# Patient Record
Sex: FEMALE | Race: WHITE | NOT HISPANIC OR LATINO | Employment: FULL TIME | URBAN - METROPOLITAN AREA
[De-identification: names, ages, dates, MRNs, and addresses within clinical notes are randomized per-mention and may not be internally consistent; named-entity substitution may affect disease eponyms.]

---

## 2017-08-04 ENCOUNTER — APPOINTMENT (OUTPATIENT)
Dept: LAB | Age: 57
End: 2017-08-04

## 2017-08-04 ENCOUNTER — TRANSCRIBE ORDERS (OUTPATIENT)
Dept: ADMINISTRATIVE | Age: 57
End: 2017-08-04

## 2017-08-04 DIAGNOSIS — Z00.8 HEALTH EXAMINATION IN POPULATION SURVEY: ICD-10-CM

## 2017-08-04 DIAGNOSIS — Z00.8 HEALTH EXAMINATION IN POPULATION SURVEY: Primary | ICD-10-CM

## 2017-08-04 LAB
CHOLEST SERPL-MCNC: 159 MG/DL (ref 50–200)
EST. AVERAGE GLUCOSE BLD GHB EST-MCNC: 117 MG/DL
HBA1C MFR BLD: 5.7 % (ref 4.2–6.3)
HDLC SERPL-MCNC: 56 MG/DL (ref 40–60)
LDLC SERPL CALC-MCNC: 80 MG/DL (ref 0–100)
TRIGL SERPL-MCNC: 113 MG/DL

## 2017-08-04 PROCEDURE — 83036 HEMOGLOBIN GLYCOSYLATED A1C: CPT

## 2017-08-04 PROCEDURE — 36415 COLL VENOUS BLD VENIPUNCTURE: CPT

## 2017-08-04 PROCEDURE — 80061 LIPID PANEL: CPT

## 2017-08-08 ENCOUNTER — TRANSCRIBE ORDERS (OUTPATIENT)
Dept: ADMINISTRATIVE | Facility: HOSPITAL | Age: 57
End: 2017-08-08

## 2017-08-08 DIAGNOSIS — Z12.31 VISIT FOR SCREENING MAMMOGRAM: Primary | ICD-10-CM

## 2017-08-08 DIAGNOSIS — Z12.31 ENCOUNTER FOR SCREENING MAMMOGRAM FOR MALIGNANT NEOPLASM OF BREAST: ICD-10-CM

## 2017-08-22 ENCOUNTER — ALLSCRIPTS OFFICE VISIT (OUTPATIENT)
Dept: OTHER | Facility: OTHER | Age: 57
End: 2017-08-22

## 2017-08-22 ENCOUNTER — LAB REQUISITION (OUTPATIENT)
Dept: LAB | Facility: HOSPITAL | Age: 57
End: 2017-08-22
Payer: COMMERCIAL

## 2017-08-22 DIAGNOSIS — Z01.419 ENCOUNTER FOR GYNECOLOGICAL EXAMINATION WITHOUT ABNORMAL FINDING: ICD-10-CM

## 2017-08-22 PROCEDURE — G0145 SCR C/V CYTO,THINLAYER,RESCR: HCPCS | Performed by: OBSTETRICS & GYNECOLOGY

## 2017-08-23 ENCOUNTER — HOSPITAL ENCOUNTER (OUTPATIENT)
Dept: RADIOLOGY | Age: 57
Discharge: HOME | End: 2017-08-23
Payer: COMMERCIAL

## 2017-08-23 DIAGNOSIS — Z12.31 ENCOUNTER FOR SCREENING MAMMOGRAM FOR MALIGNANT NEOPLASM OF BREAST: ICD-10-CM

## 2017-08-23 PROCEDURE — G0202 SCR MAMMO BI INCL CAD: HCPCS

## 2017-08-30 LAB
LAB AP GYN PRIMARY INTERPRETATION: NORMAL
Lab: NORMAL
PATH INTERP SPEC-IMP: NORMAL

## 2017-12-07 ENCOUNTER — APPOINTMENT (OUTPATIENT)
Dept: LAB | Facility: HOSPITAL | Age: 57
End: 2017-12-07
Attending: OBSTETRICS & GYNECOLOGY
Payer: COMMERCIAL

## 2017-12-07 ENCOUNTER — GENERIC CONVERSION - ENCOUNTER (OUTPATIENT)
Dept: OTHER | Facility: OTHER | Age: 57
End: 2017-12-07

## 2017-12-07 ENCOUNTER — TRANSCRIBE ORDERS (OUTPATIENT)
Dept: LAB | Facility: HOSPITAL | Age: 57
End: 2017-12-07

## 2017-12-07 DIAGNOSIS — N92.1 METRORRHAGIA: Primary | ICD-10-CM

## 2017-12-07 DIAGNOSIS — N92.1 EXCESSIVE AND FREQUENT MENSTRUATION WITH IRREGULAR CYCLE: ICD-10-CM

## 2017-12-07 LAB — FSH SERPL-ACNC: 23.5 MIU/ML

## 2017-12-07 PROCEDURE — 83001 ASSAY OF GONADOTROPIN (FSH): CPT

## 2017-12-07 PROCEDURE — 36415 COLL VENOUS BLD VENIPUNCTURE: CPT

## 2017-12-08 ENCOUNTER — HOSPITAL ENCOUNTER (OUTPATIENT)
Dept: RADIOLOGY | Facility: HOSPITAL | Age: 57
Discharge: HOME | End: 2017-12-08
Attending: OBSTETRICS & GYNECOLOGY
Payer: COMMERCIAL

## 2017-12-08 DIAGNOSIS — N92.1 EXCESSIVE AND FREQUENT MENSTRUATION WITH IRREGULAR CYCLE: ICD-10-CM

## 2017-12-08 PROCEDURE — 76830 TRANSVAGINAL US NON-OB: CPT

## 2017-12-08 PROCEDURE — 76856 US EXAM PELVIC COMPLETE: CPT

## 2017-12-20 ENCOUNTER — ALLSCRIPTS OFFICE VISIT (OUTPATIENT)
Dept: OTHER | Facility: OTHER | Age: 57
End: 2017-12-20

## 2017-12-21 ENCOUNTER — GENERIC CONVERSION - ENCOUNTER (OUTPATIENT)
Dept: OTHER | Facility: OTHER | Age: 57
End: 2017-12-21

## 2017-12-21 ENCOUNTER — GENERIC CONVERSION - ENCOUNTER (OUTPATIENT)
Dept: GYNECOLOGIC ONCOLOGY | Facility: CLINIC | Age: 57
End: 2017-12-21

## 2017-12-21 ENCOUNTER — HOSPITAL ENCOUNTER (OUTPATIENT)
Dept: RADIOLOGY | Facility: HOSPITAL | Age: 57
Discharge: HOME | End: 2017-12-21
Attending: OBSTETRICS & GYNECOLOGY
Payer: COMMERCIAL

## 2017-12-21 ENCOUNTER — LAB (OUTPATIENT)
Dept: LAB | Facility: HOSPITAL | Age: 57
End: 2017-12-21
Attending: OBSTETRICS & GYNECOLOGY
Payer: COMMERCIAL

## 2017-12-21 DIAGNOSIS — N92.1 METRORRHAGIA: ICD-10-CM

## 2017-12-21 DIAGNOSIS — N92.1 EXCESSIVE AND FREQUENT MENSTRUATION WITH IRREGULAR CYCLE: ICD-10-CM

## 2017-12-21 LAB
ABO GROUP BLD: NORMAL
ANION GAP SERPL CALCULATED.3IONS-SCNC: 6 MMOL/L (ref 4–13)
ATRIAL RATE: 67 BPM
BASOPHILS # BLD AUTO: 0.03 THOUSANDS/ΜL (ref 0–0.1)
BASOPHILS NFR BLD AUTO: 1 % (ref 0–1)
BLD GP AB SCN SERPL QL: NEGATIVE
BUN SERPL-MCNC: 17 MG/DL (ref 5–25)
CALCIUM SERPL-MCNC: 9.2 MG/DL (ref 8.3–10.1)
CHLORIDE SERPL-SCNC: 107 MMOL/L (ref 100–108)
CO2 SERPL-SCNC: 28 MMOL/L (ref 21–32)
CREAT SERPL-MCNC: 0.74 MG/DL (ref 0.6–1.3)
EOSINOPHIL # BLD AUTO: 0.13 THOUSAND/ΜL (ref 0–0.61)
EOSINOPHIL NFR BLD AUTO: 2 % (ref 0–6)
ERYTHROCYTE [DISTWIDTH] IN BLOOD BY AUTOMATED COUNT: 14.6 % (ref 11.6–15.1)
EST. AVERAGE GLUCOSE BLD GHB EST-MCNC: 120 MG/DL
GFR SERPL CREATININE-BSD FRML MDRD: 89 ML/MIN/1.73SQ M
GLUCOSE P FAST SERPL-MCNC: 78 MG/DL (ref 65–99)
HBA1C MFR BLD: 5.8 % (ref 4.2–6.3)
HCT VFR BLD AUTO: 42.8 % (ref 34.8–46.1)
HGB BLD-MCNC: 14.4 G/DL (ref 11.5–15.4)
LYMPHOCYTES # BLD AUTO: 1.5 THOUSANDS/ΜL (ref 0.6–4.47)
LYMPHOCYTES NFR BLD AUTO: 25 % (ref 14–44)
MCH RBC QN AUTO: 30.3 PG (ref 26.8–34.3)
MCHC RBC AUTO-ENTMCNC: 33.6 G/DL (ref 31.4–37.4)
MCV RBC AUTO: 90 FL (ref 82–98)
MONOCYTES # BLD AUTO: 0.45 THOUSAND/ΜL (ref 0.17–1.22)
MONOCYTES NFR BLD AUTO: 8 % (ref 4–12)
NEUTROPHILS # BLD AUTO: 3.88 THOUSANDS/ΜL (ref 1.85–7.62)
NEUTS SEG NFR BLD AUTO: 64 % (ref 43–75)
NRBC BLD AUTO-RTO: 0 /100 WBCS
P AXIS: 51 DEGREES
PLATELET # BLD AUTO: 319 THOUSANDS/UL (ref 149–390)
PMV BLD AUTO: 12.3 FL (ref 8.9–12.7)
POTASSIUM SERPL-SCNC: 4.2 MMOL/L (ref 3.5–5.3)
PR INTERVAL: 160 MS
QRS AXIS: 24 DEGREES
QRSD INTERVAL: 84 MS
QT INTERVAL: 382 MS
QTC INTERVAL: 403 MS
RBC # BLD AUTO: 4.75 MILLION/UL (ref 3.81–5.12)
RH BLD: POSITIVE
SODIUM SERPL-SCNC: 141 MMOL/L (ref 136–145)
SPECIMEN EXPIRATION DATE: NORMAL
T WAVE AXIS: 47 DEGREES
VENTRICULAR RATE: 67 BPM
WBC # BLD AUTO: 5.99 THOUSAND/UL (ref 4.31–10.16)

## 2017-12-21 PROCEDURE — 93005 ELECTROCARDIOGRAM TRACING: CPT

## 2017-12-21 PROCEDURE — 86850 RBC ANTIBODY SCREEN: CPT

## 2017-12-21 PROCEDURE — 86900 BLOOD TYPING SEROLOGIC ABO: CPT

## 2017-12-21 PROCEDURE — 83036 HEMOGLOBIN GLYCOSYLATED A1C: CPT

## 2017-12-21 PROCEDURE — 71020 HB CHEST X-RAY 2VW FRONTAL&LATL: CPT

## 2017-12-21 PROCEDURE — 80048 BASIC METABOLIC PNL TOTAL CA: CPT

## 2017-12-21 PROCEDURE — 36415 COLL VENOUS BLD VENIPUNCTURE: CPT

## 2017-12-21 PROCEDURE — 85025 COMPLETE CBC W/AUTO DIFF WBC: CPT

## 2017-12-21 PROCEDURE — 86901 BLOOD TYPING SEROLOGIC RH(D): CPT

## 2017-12-21 NOTE — CONSULTS
Assessment   1  History of Treatment Of Forearm Fracture   2  History of Dilation And Curettage   3  Postmenopausal bleeding (627 1) (N95 0)    Plan   Postmenopausal bleeding    · OxyCODONE HCl - 5 MG Oral Tablet; Take 1-2 tablets as needed for postoperative    pain after surgical procedure   Rx By: Carmella Luu; Dispense: 0 Days ; #:20 Tablet; Refill: 0;For: Postmenopausal bleeding; MARA = N; Record   · Schedule Surgery Treatment  Procedure: Laparoscopic versus open hysterectomy,    bilateral salpingo-oophorectomy,   Possible staging and all other indicated    procedures  Status: Hold For - Scheduling  Requested for: [id number]   Ordered; For: Postmenopausal bleeding; Ordered By: Carmella Luu Performed:  Due: [id number]    Discussion/Summary   Discussion Summary:       1  Abnormal uterine bleeding, menopausal: Bleeding irregularly has been a recurrent problem  Now, she is menopausal  Her BMI is in the obesity range  She is 59years old  She understands her Hilda tip risk of eventual hyperplasia and/or malignancy  However, there is no evidence of such at this time  After discussing medical therapies, she is adamant about proceeding with definitive surgical intervention  After counseling, she has agreed to proceed with laparoscopic versus robotic hysterectomy and bilateral salpingo-oophorectomy  Of note, we had a long conversation about pros and cons of ovarian preservation versus BSO  She opted to undergo bilateral salpingo-oophorectomy  Will obtain basic laboratory testing, chest x-ray, EKG and hemoglobin A1c as per surgical site infection reduction protocol  I discussed with patient indication, risks, benefits and alternatives of surgical exploration  We discussed possibility of bleeding requiring blood transfusion, life-threatening infections requiring additional procedures, injuries to surrounding organs such as bladder, ureters, gastrointestinal tract and or neurovascular structures  Additionally, we discussed general risks associated to stress of surgery such as venous thromboembolism, acute myocardial events and stroke  All questions answered to patient's satisfaction  She agrees and wants to proceed  Informed consent form signed  She understands potential conversion to laparotomy  Goals and Barriers: The patient has the current Goals: Proceed with definitive surgery for persistent now postmenopausal abnormal uterine bleeding  The patent has the current Barriers: None  Patient's Capacity to Self-Care: Patient is able to Self-Care  Patient Education: Educational resources provided: Surgical counseling  Surgical packet given  Chief Complaint   Chief Complaint Free Text Note Form: Here for consideration of surgical intervention for postmenopausal bleeding  Desires definitive therapy  History of Present Illness   Problem [facility]:       1  Long-standing history of abnormal uterine bleeding  2  FSH in menopausal range, postmenopausal bleeding  3  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  Previous Therapy:       1 October 2016: hysteroscopy, polypectomy, D&C  final pathology with endometrial polyp and combined inactive and secretory endometrium with stromal condensation and breakdown  No hyperplasia or malignancy  Free Text HPI:    22-year-old white female, healthy with no medical comorbidities and longstanding history of abnormal uterine bleeding  Last year, she underwent hysteroscopy, polypectomy and D&C which demonstrated polyp with evidence of combined secretory endometrium and areas of inactive endometrium  After diet, her abnormal bleeding improved for short period of time  However, now bleeding is again persistent, irregular and bothersome  Reports occasional small blood clots  Reports sleep disturbances which precede episodes of bleeding  Patient approach me inquiring about my opinion regarding potential management options   I ordered ultrasound which demonstrated possible bicornuate /are quite uterus with limited visualization of what appears to represent a thin endometrium  FSH was obtained and noted to be greater than 20 in menopausal range  I discussed with patient extensively potential management around perimenopause including hormonal therapy, ablated of procedures or definitive surgical intervention  She desires to proceed with definitive surgery by means of hysterectomy  Review of SystemsROS Reviewed:    ROS reviewed  Active Problems   1  Menopause (627 2) (Z78 0)   2  Nontoxic single thyroid nodule (241 0) (E04 1)   3  Osteopenia (733 90) (M85 80)    Past Medical History   1  History of Encounter for routine gynecological examination (V72 31) (Z01 419)   2  History of Encounter for screening mammogram for malignant neoplasm of breast     (V76 12) (Z12 31)   3  History of Grade 1 injury of medial collateral ligament of left knee (844 1) (S83 412A)   4  History of metrorrhagia (V13 29) (Z87 42)   5  History of rosacea (V13 3) (Z87 2)   6  History of screening mammography (V15 89) (Z92 89)   7  History of Menometrorrhagia (626 2) (N92 1)   8  History of Postoperative visit (V58 49) (Z48 89)   9  History of Prolapsing Mitral Valve Leaflet Syndrome (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 0)   10  History of Screening for osteoporosis (V82 81) (Z13 820)   11  History of Summary Of Previous Pregnancies  1  (Total No )   12  History of Summary Of Previous Pregnancies Para 1  (Deliveries)   13  History of Vaginal candidiasis (112 1) (B37 3)  GYN Onc Past GYN History [facility]:      Patient GYN History: First period was age 4,--  2,-- Para 3,-- menopause at age 59-- and-- One prior  section  No abnormal Paps  Last mammogram: 2017, BI-RADS category 2  Last colonoscopy: , reportedly normal with no polyps  , but-- no abnormal pap smears in the past-- and-- no history of STD(s)  Active Problems And Past Medical History Reviewed:     The active problems and past medical history were reviewed and updated today  Surgical History   1  History of  Section   2  History of Complete Colonoscopy   3  History of Dilation And Curettage   4  History of Treatment Of Forearm Fracture  Surgical History Reviewed: The surgical history was reviewed and updated today  Family History   Mother    1  Family history of Diabetes Mellitus (V18 0)   2  Family history of Heart Disease (V17 49)   3  Family history of Stroke Syndrome (V17 1)   4  Family history of Thyroid Disorder (V18 19)  Father    5  Family history of Diabetes Mellitus (V18 0)   6  Family history of Heart Disease (V17 49)  Family History    7  Family history of Hypertension (V17 49)  Family History Reviewed: The family history was reviewed and updated today  Social History    · Being A Social Drinker   · Marital History - Currently    · Never A Smoker   · Sexually Active   · Working Full Time  Social History Reviewed: The social history was reviewed and updated today  The social history was reviewed and is unchanged  Current Meds    1  Calcium Carbonate 600 MG Oral Tablet; TAKE 2 TABLETS DAILY; Therapy: 2016 to (Evaluate:[id number]); Last Rx:2016 Ordered   2  Fiber CAPS; Therapy: (Carin Nigh) to Recorded   3  Finacea 15 % External Gel; APPLY SPARINGLY AND RUB IN WELL TO AFFECTED     AREA(S) ONCE DAILY  Requested for: [id number]; Last Rx:[id number] Ordered  Medication List Reviewed: The medication list was reviewed and updated today  Allergies   1  Bactrim TABS   2  Ceclor CAPS   3  Cipro HC SUSP   4  Erythromycin Estolate POWD   5  Erythromycin Estolate SUSP   6  Flagyl CAPS   7   Penicillins    Vitals   Vital Signs    Recorded: 38Nwq2758 08:24AM   Temperature 98 1 F, Oral   Heart Rate 68, L Radial   Pulse Quality Normal, L Radial   Systolic 852, LUE, Sitting   Diastolic 64, LUE, Sitting   Height 5 ft 3 in   Weight 188 lb 8 oz   BMI Calculated 33 39   BSA Calculated 1 89   O2 Saturation 98     Physical Exam        Constitutional      General appearance: No acute distress, well appearing and well nourished  Neck      Neck: Normal, supple, trachea midline, no masses  Thyroid: Normal, no thyromegaly  Pulmonary      Respiratory effort: No increased work of breathing or signs of respiratory distress  Auscultation of lungs: Clear to auscultation  Cardiovascular      Auscultation of heart: Normal rate and rhythm, normal S1 and S2, no murmurs  Peripheral vascular exam: Normal pulses throughout  No edema noted in lower extremities  Genitourinary      External genitalia: Normal and no lesions appreciated  Vagina: Normal, no lesions or dryness appreciated  Urethra: Normal        Urethral meatus: Normal        Bladder: Normal, soft, non-tender and no prolapse or masses appreciated  Cervix: Normal, no palpable masses  Uterus: Normal, non-tender, not enlarged, and no palpaple masses  Adnexa/parametria: Normal, non-tender and no fullness or masses appreciated  Abdomen      Abdomen: Normal, soft, non-tender, and no organomegaly noted  Liver and spleen: No hepatomegaly or splenomegaly  Examination for hernias: No hernias appreciated  Lymphatic      Palpation of lymph nodes in neck, axillae, groin and/or other locations: No lymphadenopathy or masses noted  Skin      Skin and subcutaneous tissue: Normal skin turgor and no rashes  Psychiatric      Orientation to person, place, and time: Normal        Mood and affect: Normal        GOG performance status is: 0      Results/Data   * US PELVIS COMPLETE [facility] AND TRANSVAGINAL) [id number] 01:38PM Ashutosh Fly Order Number: [id number]       - Patient Instructions: To schedule this appointment, please contact Central Scheduling at [phone]        Test Name Result Flag Reference   US PELVIS COMPLETE (TRANSABDOMINAL AND TRANSVAGINAL) (Report)     PELVIC ULTRASOUND, COMPLETE           INDICATION: 60year-old with excessive and frequent menses  Patient with prior history of dilatation and curettage and polypectomy  Patient is perimenopausal  Last LMP was 12/1/2017  COMPARISON: None  TECHNIQUE:  Transabdominal pelvic ultrasound was performed in sagittal and transverse planes with a curvilinear transducer  Additional transvaginal imaging was performed to better evaluate the endometrium and ovaries  Imaging included volumetric       sweeps as well as traditional still imaging technique  FINDINGS:           UTERUS:      The uterus is anteverted in position, measuring 8 0 x 3 7 x 7 0 cm  The echotexture of the uterus is heterogeneous with suggestion of tiny myometrial cysts  The cervix shows no suspicious abnormality  Nabothian cysts are present  ENDOMETRIUM:       There is suggestion of 2 endometrial stripes though not well visualized, suggestive of bicornuate configuration  The visualized endometrium is normal in appearance and caliber, measuring 3 mm  OVARIES/ADNEXA:           The ovaries are only seen transabdominally  Right ovary: 2 6 x 1 7 x 2 2 cm  No suspicious right ovarian abnormality  Doppler flow within normal limits  Left ovary: 2 5 x 1 5 x 1 6 cm  No suspicious left ovarian abnormality  Doppler flow within normal limits  No suspicious adnexal mass or loculated collections  There is no free fluid  IMPRESSION:                 1  Heterogeneous echotexture of the uterus with suggestion of tiny myometrial cysts, which could indicate underlying adenomyosis  2  Suggestion of bicornuate uterus, though not well visualized  Visualized endometrium is normal in appearance and caliber measuring 3 mm  3  Normal transabdominal appearance of the ovaries  Workstation performed: [id number]           Signed by:      Job Duane, MD      12/8/17      (1) [address] [id number] 02:58PM Faina Longo Order Number: [id number]      Test Name Result Flag Reference   FOLLICLE STIMULATING HORMONE 23 5 mIU/mL     FSH:     Menstruating Females:        Follicular Phase  1 2-23 6 mIU/mL       Mid-Cycle Phase   5 2-17 5 mIU/mL       Luteal Phase      1 7-9 5  mIU/mL     Postmenopausal Females       On Menopausal Hormone Therapy(HRT) 5 9-72 8   mIU/mL       Untreated                          12 7-132 2 mIU/mL      Signatures    Electronically signed by : Valorie Walsh MD; Dec 20 2017  9:09AM EST                       (Author)

## 2017-12-22 NOTE — PRE-PROCEDURE INSTRUCTIONS
No outpatient prescriptions have been marked as taking for the 1/4/18 encounter [facility] Encounter)  I SPOKE TO PATIENT ON THE PHONE TODAY TO REVIEW PATS  DATABASE COMPLETE, PER PATIENT ALLERGIES REVIEWED YESTERDAY IN SURGEONS OFFICE NO CHANGES SINCE THEN  DENIES QUESTIONS OR CONCERNS WITH BATHING INSTRUCTIONS  MEDS REVIEWED  PATIENT VERBALIZED UNDERSTANDING  NO NEEDS AT THIS TIME          CORA/YARIEL

## 2017-12-25 ENCOUNTER — ANESTHESIA EVENT (OUTPATIENT)
Dept: PERIOP | Facility: HOSPITAL | Age: 57
End: 2017-12-25
Payer: COMMERCIAL

## 2018-01-04 ENCOUNTER — ANESTHESIA (OUTPATIENT)
Dept: PERIOP | Facility: HOSPITAL | Age: 58
End: 2018-01-04
Payer: COMMERCIAL

## 2018-01-04 ENCOUNTER — HOSPITAL ENCOUNTER (OUTPATIENT)
Facility: HOSPITAL | Age: 58
Setting detail: OUTPATIENT SURGERY
Discharge: HOME | End: 2018-01-04
Attending: OBSTETRICS & GYNECOLOGY | Admitting: OBSTETRICS & GYNECOLOGY
Payer: COMMERCIAL

## 2018-01-04 VITALS
TEMPERATURE: 99.7 F | BODY MASS INDEX: 33.31 KG/M2 | HEIGHT: 63 IN | WEIGHT: 188 LBS | RESPIRATION RATE: 16 BRPM | DIASTOLIC BLOOD PRESSURE: 58 MMHG | OXYGEN SATURATION: 98 % | SYSTOLIC BLOOD PRESSURE: 109 MMHG | HEART RATE: 76 BPM

## 2018-01-04 DIAGNOSIS — N92.1 EXCESSIVE AND FREQUENT MENSTRUATION WITH IRREGULAR CYCLE: ICD-10-CM

## 2018-01-04 PROBLEM — N95.0 POSTMENOPAUSAL BLEEDING: Status: ACTIVE | Noted: 2018-01-04

## 2018-01-04 LAB — GLUCOSE SERPL-MCNC: 110 MG/DL (ref 65–140)

## 2018-01-04 PROCEDURE — 82948 REAGENT STRIP/BLOOD GLUCOSE: CPT

## 2018-01-04 PROCEDURE — 88307 TISSUE EXAM BY PATHOLOGIST: CPT | Performed by: OBSTETRICS & GYNECOLOGY

## 2018-01-04 RX ORDER — ROCURONIUM BROMIDE 10 MG/ML
INJECTION, SOLUTION INTRAVENOUS AS NEEDED
Status: DISCONTINUED | OUTPATIENT
Start: 2018-01-04 | End: 2018-01-04 | Stop reason: SURG

## 2018-01-04 RX ORDER — METOCLOPRAMIDE HYDROCHLORIDE 5 MG/ML
10 INJECTION INTRAMUSCULAR; INTRAVENOUS ONCE AS NEEDED
Status: DISCONTINUED | OUTPATIENT
Start: 2018-01-04 | End: 2018-01-04 | Stop reason: HOSPADM

## 2018-01-04 RX ORDER — PREGABALIN 75 MG/1
75 CAPSULE ORAL ONCE
Status: COMPLETED | OUTPATIENT
Start: 2018-01-04 | End: 2018-01-04

## 2018-01-04 RX ORDER — DIPHENHYDRAMINE HYDROCHLORIDE 50 MG/ML
12.5 INJECTION INTRAMUSCULAR; INTRAVENOUS ONCE AS NEEDED
Status: DISCONTINUED | OUTPATIENT
Start: 2018-01-04 | End: 2018-01-04 | Stop reason: HOSPADM

## 2018-01-04 RX ORDER — FENTANYL CITRATE/PF 50 MCG/ML
25 SYRINGE (ML) INJECTION
Status: DISCONTINUED | OUTPATIENT
Start: 2018-01-04 | End: 2018-01-04 | Stop reason: HOSPADM

## 2018-01-04 RX ORDER — EPHEDRINE SULFATE 50 MG/ML
INJECTION, SOLUTION INTRAVENOUS AS NEEDED
Status: DISCONTINUED | OUTPATIENT
Start: 2018-01-04 | End: 2018-01-04 | Stop reason: SURG

## 2018-01-04 RX ORDER — IBUPROFEN 600 MG/1
600 TABLET ORAL EVERY 6 HOURS PRN
Status: DISCONTINUED | OUTPATIENT
Start: 2018-01-04 | End: 2018-01-04 | Stop reason: HOSPADM

## 2018-01-04 RX ORDER — ONDANSETRON 2 MG/ML
INJECTION INTRAMUSCULAR; INTRAVENOUS AS NEEDED
Status: DISCONTINUED | OUTPATIENT
Start: 2018-01-04 | End: 2018-01-04 | Stop reason: SURG

## 2018-01-04 RX ORDER — PROPOFOL 10 MG/ML
INJECTION, EMULSION INTRAVENOUS AS NEEDED
Status: DISCONTINUED | OUTPATIENT
Start: 2018-01-04 | End: 2018-01-04 | Stop reason: SURG

## 2018-01-04 RX ORDER — ACETAMINOPHEN 325 MG/1
650 TABLET ORAL EVERY 4 HOURS PRN
Status: DISCONTINUED | OUTPATIENT
Start: 2018-01-04 | End: 2018-01-04 | Stop reason: HOSPADM

## 2018-01-04 RX ORDER — OXYCODONE HYDROCHLORIDE AND ACETAMINOPHEN 5; 325 MG/1; MG/1
1 TABLET ORAL EVERY 4 HOURS PRN
Status: DISCONTINUED | OUTPATIENT
Start: 2018-01-04 | End: 2018-01-04 | Stop reason: HOSPADM

## 2018-01-04 RX ORDER — SODIUM CHLORIDE, SODIUM LACTATE, POTASSIUM CHLORIDE, CALCIUM CHLORIDE 600; 310; 30; 20 MG/100ML; MG/100ML; MG/100ML; MG/100ML
125 INJECTION, SOLUTION INTRAVENOUS CONTINUOUS
Status: DISCONTINUED | OUTPATIENT
Start: 2018-01-04 | End: 2018-01-04 | Stop reason: HOSPADM

## 2018-01-04 RX ORDER — ACETAMINOPHEN 325 MG/1
975 TABLET ORAL ONCE
Status: COMPLETED | OUTPATIENT
Start: 2018-01-04 | End: 2018-01-04

## 2018-01-04 RX ORDER — ONDANSETRON 2 MG/ML
4 INJECTION INTRAMUSCULAR; INTRAVENOUS EVERY 6 HOURS PRN
Status: DISCONTINUED | OUTPATIENT
Start: 2018-01-04 | End: 2018-01-04 | Stop reason: HOSPADM

## 2018-01-04 RX ORDER — SODIUM CHLORIDE 9 MG/ML
INJECTION, SOLUTION INTRAVENOUS CONTINUOUS PRN
Status: DISCONTINUED | OUTPATIENT
Start: 2018-01-04 | End: 2018-01-04 | Stop reason: SURG

## 2018-01-04 RX ORDER — CLINDAMYCIN PHOSPHATE 900 MG/50ML
900 INJECTION INTRAVENOUS ONCE
Status: COMPLETED | OUTPATIENT
Start: 2018-01-04 | End: 2018-01-04

## 2018-01-04 RX ORDER — BUPIVACAINE HYDROCHLORIDE 2.5 MG/ML
INJECTION, SOLUTION EPIDURAL; INFILTRATION; INTRACAUDAL AS NEEDED
Status: DISCONTINUED | OUTPATIENT
Start: 2018-01-04 | End: 2018-01-04 | Stop reason: HOSPADM

## 2018-01-04 RX ORDER — FENTANYL CITRATE 50 UG/ML
INJECTION, SOLUTION INTRAMUSCULAR; INTRAVENOUS AS NEEDED
Status: DISCONTINUED | OUTPATIENT
Start: 2018-01-04 | End: 2018-01-04 | Stop reason: SURG

## 2018-01-04 RX ORDER — OXYCODONE HYDROCHLORIDE AND ACETAMINOPHEN 5; 325 MG/1; MG/1
2 TABLET ORAL EVERY 4 HOURS PRN
Status: DISCONTINUED | OUTPATIENT
Start: 2018-01-04 | End: 2018-01-04 | Stop reason: HOSPADM

## 2018-01-04 RX ORDER — LIDOCAINE HYDROCHLORIDE 10 MG/ML
INJECTION, SOLUTION INFILTRATION; PERINEURAL AS NEEDED
Status: DISCONTINUED | OUTPATIENT
Start: 2018-01-04 | End: 2018-01-04 | Stop reason: SURG

## 2018-01-04 RX ORDER — ONDANSETRON 2 MG/ML
4 INJECTION INTRAMUSCULAR; INTRAVENOUS ONCE AS NEEDED
Status: DISCONTINUED | OUTPATIENT
Start: 2018-01-04 | End: 2018-01-04 | Stop reason: HOSPADM

## 2018-01-04 RX ORDER — MAGNESIUM HYDROXIDE 1200 MG/15ML
LIQUID ORAL AS NEEDED
Status: DISCONTINUED | OUTPATIENT
Start: 2018-01-04 | End: 2018-01-04 | Stop reason: HOSPADM

## 2018-01-04 RX ORDER — ALBUMIN, HUMAN INJ 5% 5 %
SOLUTION INTRAVENOUS CONTINUOUS PRN
Status: DISCONTINUED | OUTPATIENT
Start: 2018-01-04 | End: 2018-01-04 | Stop reason: SURG

## 2018-01-04 RX ORDER — KETOROLAC TROMETHAMINE 30 MG/ML
INJECTION, SOLUTION INTRAMUSCULAR; INTRAVENOUS AS NEEDED
Status: DISCONTINUED | OUTPATIENT
Start: 2018-01-04 | End: 2018-01-04 | Stop reason: SURG

## 2018-01-04 RX ORDER — GLYCOPYRROLATE 0.2 MG/ML
INJECTION INTRAMUSCULAR; INTRAVENOUS AS NEEDED
Status: DISCONTINUED | OUTPATIENT
Start: 2018-01-04 | End: 2018-01-04 | Stop reason: SURG

## 2018-01-04 RX ORDER — MIDAZOLAM HYDROCHLORIDE 1 MG/ML
INJECTION INTRAMUSCULAR; INTRAVENOUS AS NEEDED
Status: DISCONTINUED | OUTPATIENT
Start: 2018-01-04 | End: 2018-01-04 | Stop reason: SURG

## 2018-01-04 RX ADMIN — GLYCOPYRROLATE 0.4 MG: 0.2 INJECTION, SOLUTION INTRAMUSCULAR; INTRAVENOUS at 09:27

## 2018-01-04 RX ADMIN — ACETAMINOPHEN 975 MG: 325 TABLET, FILM COATED ORAL at 06:03

## 2018-01-04 RX ADMIN — FENTANYL CITRATE 25 MCG: 50 INJECTION, SOLUTION INTRAMUSCULAR; INTRAVENOUS at 09:15

## 2018-01-04 RX ADMIN — ROCURONIUM BROMIDE 40 MG: 10 INJECTION INTRAVENOUS at 07:37

## 2018-01-04 RX ADMIN — GLYCOPYRROLATE 0.2 MG: 0.2 INJECTION, SOLUTION INTRAMUSCULAR; INTRAVENOUS at 08:04

## 2018-01-04 RX ADMIN — LIDOCAINE HYDROCHLORIDE 50 MG: 10 INJECTION, SOLUTION INFILTRATION; PERINEURAL at 07:37

## 2018-01-04 RX ADMIN — FENTANYL CITRATE 50 MCG: 50 INJECTION, SOLUTION INTRAMUSCULAR; INTRAVENOUS at 08:08

## 2018-01-04 RX ADMIN — EPHEDRINE SULFATE 5 MG: 50 INJECTION, SOLUTION INTRAMUSCULAR; INTRAVENOUS; SUBCUTANEOUS at 08:45

## 2018-01-04 RX ADMIN — ONDANSETRON 4 MG: 2 INJECTION INTRAMUSCULAR; INTRAVENOUS at 08:04

## 2018-01-04 RX ADMIN — ROCURONIUM BROMIDE 10 MG: 10 INJECTION INTRAVENOUS at 08:33

## 2018-01-04 RX ADMIN — EPHEDRINE SULFATE 10 MG: 50 INJECTION, SOLUTION INTRAMUSCULAR; INTRAVENOUS; SUBCUTANEOUS at 08:00

## 2018-01-04 RX ADMIN — FENTANYL CITRATE 50 MCG: 50 INJECTION, SOLUTION INTRAMUSCULAR; INTRAVENOUS at 07:41

## 2018-01-04 RX ADMIN — SODIUM CHLORIDE: 0.9 INJECTION, SOLUTION INTRAVENOUS at 07:40

## 2018-01-04 RX ADMIN — PREGABALIN 75 MG: 75 CAPSULE ORAL at 06:03

## 2018-01-04 RX ADMIN — FENTANYL CITRATE 25 MCG: 50 INJECTION INTRAMUSCULAR; INTRAVENOUS at 10:16

## 2018-01-04 RX ADMIN — PROPOFOL 200 MG: 10 INJECTION, EMULSION INTRAVENOUS at 07:37

## 2018-01-04 RX ADMIN — ALBUMIN HUMAN: 0.05 INJECTION, SOLUTION INTRAVENOUS at 08:50

## 2018-01-04 RX ADMIN — Medication 130 MG: at 07:51

## 2018-01-04 RX ADMIN — ENOXAPARIN SODIUM 40 MG: 40 INJECTION SUBCUTANEOUS at 07:11

## 2018-01-04 RX ADMIN — CLINDAMYCIN PHOSPHATE 900 MG: 18 INJECTION, SOLUTION INTRAMUSCULAR; INTRAVENOUS at 07:33

## 2018-01-04 RX ADMIN — IBUPROFEN 600 MG: 600 TABLET, FILM COATED ORAL at 10:56

## 2018-01-04 RX ADMIN — NEOSTIGMINE METHYLSULFATE 3 MG: 1 INJECTION, SOLUTION INTRAMUSCULAR; INTRAVENOUS; SUBCUTANEOUS at 09:27

## 2018-01-04 RX ADMIN — KETOROLAC TROMETHAMINE 30 MG: 30 INJECTION, SOLUTION INTRAMUSCULAR at 09:05

## 2018-01-04 RX ADMIN — MIDAZOLAM HYDROCHLORIDE 2 MG: 1 INJECTION, SOLUTION INTRAMUSCULAR; INTRAVENOUS at 07:28

## 2018-01-04 RX ADMIN — SODIUM CHLORIDE, SODIUM LACTATE, POTASSIUM CHLORIDE, AND CALCIUM CHLORIDE: .6; .31; .03; .02 INJECTION, SOLUTION INTRAVENOUS at 07:14

## 2018-01-04 RX ADMIN — DEXAMETHASONE SODIUM PHOSPHATE 10 MG: 10 INJECTION INTRAMUSCULAR; INTRAVENOUS at 08:04

## 2018-01-04 NOTE — ANESTHESIA POSTPROCEDURE EVALUATION
Post-Op Assessment Note      CV Status:  Stable    Mental Status:  Alert and awake    Hydration Status:  Euvolemic    PONV Controlled:  Controlled    Airway Patency:  Patent    Post Op Vitals Reviewed: Yes          Staff: CRNA           BP   106/52   Temp 97 0   Pulse 75   Resp   12   SpO2   100%

## 2018-01-04 NOTE — OP NOTE
OPERATIVE REPORT  PATIENT NAME: Moses Rausch    :  1960  MRN: [id number]  Pt Location: BE OR ROOM 14    SURGERY DATE: 2018    Surgeon(s) and Role:     * Carolynn Low MD - Primary     * Abdi Faith MD - Assisting     * Abigail Clifford PA-C - Assisting     * Tiff Pascual - Assisting    Preop Diagnosis:  Postmenopausal bleeding  Post-Op Diagnosis Codes:  Postmenopausal bleeding  Procedure(s) (LRB):  ROBOTIC HYSTERECTOMY, BSO  (N/A)  CYSTOSCOPY (N/A)    Specimen(s):  ID Type Source Tests Collected by Time Destination   1 : cervix, uterus b/l tubes & ovaries Tissue Uterus w/Bilateral Ovaries and Fallopian Tubes TISSUE EXAM Carolynn Low MD 2018 2517        Estimated Blood Loss:   25 mL    Anesthesia Type:   General    Operative Indications:  Patient with longstanding history of menometrorrhagia  Now with confirmed menopausal status by [facility] levels  Prior endometrial assessment has shown no evidence of hyperplasia or malignancy  After counseling regarding indication, risk, benefits and alternatives to surgical treatment she opted for definitive surgery with hysterectomy and bilateral salpingo-oophorectomy  Operative Findings:  1  Bicornuate uterus, approximately 10-12 week size  Endometrial cavities examined grossly in the operating room by surgeon with no evidence of gross pathology  There were multiple submucosal and intramural fibroids noted  2  Grossly normal bilateral fallopian tubes and ovaries  3   Cystoscopy demonstrated normal bladder mucosa and bilateral potent ureteral urine jets  Complications:   None    Procedure and Technique:  The patient was taken to the operating room where general endotracheal anesthesia was induced without complications  The patient received antibiotic prophylaxis per hospital protocol  Sequential compression devices were applied to the lower extremities and activated prior to induction of anesthesia   A single dose of preoperative Lovenox was administered  The patient was placed in the dorsolithotomy position in [address] [place] [address] and her lower abdomen, perineum and vagina were prepped and draped in the usual sterile fashion  Under direct visualization the uterus was sounded to approximately 9 cm  The endocervix was dilated  A  MARIA DEL ROSARIO uterine manipulator was secured in place with a stitch of 0 Vicryl  Victor catheter was placed and the intravaginal occluder balloon was inflated  Attention was turned to the abdomen  All port sites were infiltrated with bupivacaine at the beginning of completion of the procedure  An 8 mm skin incision was made approximately 4-5 cm above the umbilicus near the midline  Then, using a 5 mm Endopath Excel trocar and the 5 mm laparoscope, the peritoneal cavity was entered under directvisualization  Good intraperitoneal location was confirmed and pneumoperitoneum was created to maximal pressure 15 mm of mercury  The above-mentioned findings were conveyed firmed and I decided to proceed robotically  Three 8 mm robotic trocars were placed  The 5 mm trocar was taken out of the midline and placed in the right flank for assistant's use  The patient was placed in steep Trendelenburg and the DaVinci system was docked  The above-mentioned findings were noted  The round ligaments were cauterized and divided bilaterally and the bladder was mobilized anteriorly  Some fibrotic adhesions associated with prior  section were noted and taken down sharply without difficulties  The bladder was mobilized without difficulties  The ureters were clearly identified transperitoneally  Adhesions from the sigmoid colon to left pelvic sidewall and left ovarian vessels were taken down sharply  A small deserosalized area on the sigmoid colon was reinforced in 2 layers with 2-0 Vicryl  The colon was mobilized out of harm's ways  The ovarian vessels were skeletonized,cauterized and divided bilaterally    The fallopian tubes and ovaries were mobilized without difficulties  The uterine vessels were skeletonized cauterized and divided bilaterally  The cardinal ligaments were serially cauterized and divided and a circumferential colpotomy was made  The specimen was removed through the vagina  The vaginal cuff was closed using 2 running stitches of 2-0 PDO Stratafix  Airtight closure an excellent hemostasis was obtained  Copious irrigation was used and excellent hemostasis was confirmed at low intraperitoneal pressures  At this point the robot was undocked  Pneumoperitoneum was completely released with the assistance of Valsalva maneuvers  All trocars were removed and the skin was closed using a subcuticular stitch of 4-0 Monocryl and Histoacryl  The Victor was removed  Using the Nezhat tip the bladder was retrograde filled with approximately 150 mL of NS  We examined the bladder then using the 5 mm laparoscope  The above mentioned findings were noted  The Victor catheter was then used to drain the bladder and the catheter was then removed  The patient tolerated the procedure well  Sponge, lap, needle and instrument counts were reported as correct x2  The patient was successfully extubated in the operating room and transferred to the post anesthetic care unit in stable condition       I was present for the entire procedure    Patient Disposition:  PACU     SIGNATURE: Susan Harris MD, Fred Stylesia  DATE: January 4, 2018  TIME: 10:06 AM

## 2018-01-04 NOTE — ANESTHESIA PREPROCEDURE EVALUATION
Review of Systems/Medical History  Patient summary reviewed  Chart reviewed  No history of anesthetic complications (no hx PONV)     Cardiovascular  EKG reviewed, Negative cardio ROS Exercise tolerance: good,    Comment: MVP,  Pulmonary  Negative pulmonary ROS ,        GI/Hepatic  Negative GI/hepatic ROS          Negative  ROS        Endo/Other  Negative endo/other ROS      GYN      Comment: Menometrorrhagia, for lap hysterectomy/BSO     Hematology  Negative hematology ROS      Musculoskeletal  Obesity (BMI 33) ,        Neurology  Negative neurology ROS      Psychology   Negative psychology ROS          Physical Exam    Airway  Comment: Small mouth  Mallampati score: II  TM Distance: >3 FB  Neck ROM: full     Dental   No notable dental hx     Cardiovascular  Comment: Negative ROS,     Pulmonary      Other Findings      Lab Results   Component Value Date    WBC 5 99 12/21/2017    HGB 14 4 12/21/2017     12/21/2017     Lab Results   Component Value Date     12/21/2017    K 4 2 12/21/2017    BUN 17 12/21/2017    CREATININE 0 74 12/21/2017    GLUCOSE 96 01/18/2016     Blood type O+/antibody neg  Lab Results   Component Value Date    HGBA1C 5 8 12/21/2017       Anesthesia Plan  ASA Score- 1     Anesthesia Type- general with ASA Monitors  Additional Monitors:   Airway Plan: ETT  Comment: 2nd PIV, possible TAP blocks discussed with pt and spouse  PO carb beverage at 0500; preop APAP/lyrica 75 mg given  Pt desires d/c home postop  Plan Factors-    Induction- intravenous  Postoperative Plan-     Informed Consent- Anesthetic plan and risks discussed with patient and spouse  I personally reviewed this patient with the CRNA  Discussed and agreed on the Anesthesia Plan with the CRNA  Brittney Ramirez

## 2018-01-04 NOTE — DISCHARGE INSTRUCTIONS
Gynecology Discharge Instructions  1  No heavy lifting more than one full gallon of milk (about 8 lbs) for the first few weeks, then increase slowly as tolerated for 8 weeks post op  2  Nothing in the vagina for 6-8 weeks and until cleared by Dr Cele Toledo  3  No tub baths or swimming while your incisions are healing  4  You may take stairs one at a time, touching each step with both feet for the first few days, then as tolerated  5  Call the office for fever greater than 100 4, heavy vaginal bleeding or increasing pain  6  Take Colace twice daily to keep your stool soft  7  Activity as tolerated    Post Op Prescriptions  You were given Rx for Percocet 5/325mg and Ibuprofen 600mg  You may take the Ibuprofen every 6 hours to relieve pain, especially cramping and mild pain  If you have additional moderate to severe pain you may take 1-2 tabs of percocet every 4-6 hours  If you have any questions regarding your prescriptions please call your doctor    Robot Assisted Laparoscopic Hysterectomy   WHAT YOU SHOULD KNOW:   Robot-assisted laparoscopic hysterectomy (RH) is surgery to remove your uterus and cervix using a machine controlled by your surgeon  Your ovaries, fallopian tubes, supporting tissues, some lymph nodes, and the top of your vagina may also be removed  After RH, you will not be able to become pregnant  You will go through menopause if your ovaries are removed  AFTER YOU LEAVE:   Medicines:  · Medicines  may be given to decrease pain or prevent a bacterial infection  You may also need to take hormone medicine such as estrogen  Ask your healthcare provider how to take this medicine safely  · Take your medicine as directed  Call your healthcare provider if you think your medicine is not helping or if you have side effects  Tell him if you are allergic to any medicine  Keep a list of the medicines, vitamins, and herbs you take  Include the amounts, and when and why you take them   Bring the list or the pill bottles to follow-up visits  Carry your medicine list with you in case of an emergency  Activity guidelines:   · You may feel like resting more after surgery  Slowly start to do more each day  Rest when you feel it is needed  · Ask when you can start having sexual intercourse again  What to expect after surgery: It is normal to bleed from your vagina after your uterus and cervix are removed  Change the sanitary pad often to prevent infection  Ask your gynecologist or healthcare provider how much bleeding to expect  Contact your healthcare provider if:   · You have a fever  · Your pain is getting worse, even after you take medicine  · Your incisions look red and swollen, or they have bad-smelling drainage coming from them  · You see new or an increased amount of bright red blood coming from your vagina or your incisions  · You have yellow, green, or bad-smelling discharge coming from your vagina  · You feel pain when you urinate or you need to urinate more often than usual     · You have trouble having a bowel movement  · Your skin is itchy, swollen, or has a rash  · You have questions or concerns about your condition or care  Seek care immediately or call 911 if:   · You feel lightheaded, short of breath, and have chest pain  · You cough up blood  · Your arm or leg feels warm, tender, and painful  It may look swollen and red  · You have more bleeding from your vagina than you were told to expect  © 2014 [employer] is for End User's use only and may not be sold, redistributed or otherwise used for commercial purposes  All illustrations and images included in CareNotes® are the copyrighted property of A D A M , Inc  or Will Carvajal  The above information is an  only  It is not intended as medical advice for individual conditions or treatments   Talk to your doctor, nurse or pharmacist before following any medical regimen to see if it is safe and effective for you

## 2018-01-23 VITALS
OXYGEN SATURATION: 98 % | HEART RATE: 68 BPM | HEIGHT: 63 IN | SYSTOLIC BLOOD PRESSURE: 124 MMHG | WEIGHT: 188.5 LBS | BODY MASS INDEX: 33.4 KG/M2 | DIASTOLIC BLOOD PRESSURE: 64 MMHG | TEMPERATURE: 98.1 F

## 2018-01-31 ENCOUNTER — OFFICE VISIT (OUTPATIENT)
Dept: GYNECOLOGIC ONCOLOGY | Facility: CLINIC | Age: 58
End: 2018-01-31

## 2018-01-31 VITALS
SYSTOLIC BLOOD PRESSURE: 128 MMHG | RESPIRATION RATE: 16 BRPM | WEIGHT: 186 LBS | HEART RATE: 72 BPM | HEIGHT: 64 IN | BODY MASS INDEX: 31.76 KG/M2 | DIASTOLIC BLOOD PRESSURE: 82 MMHG | TEMPERATURE: 97.7 F

## 2018-01-31 DIAGNOSIS — N95.0 POSTMENOPAUSAL BLEEDING: Primary | ICD-10-CM

## 2018-01-31 PROBLEM — D25.2 SUBSEROUS LEIOMYOMA OF UTERUS: Status: ACTIVE | Noted: 2018-01-31

## 2018-01-31 PROBLEM — D25.2 SUBSEROUS LEIOMYOMA OF UTERUS: Status: RESOLVED | Noted: 2018-01-31 | Resolved: 2018-01-31

## 2018-01-31 PROCEDURE — 99024 POSTOP FOLLOW-UP VISIT: CPT | Performed by: OBSTETRICS & GYNECOLOGY

## 2018-01-31 RX ORDER — CALCIUM POLYCARBOPHIL 625 MG 625 MG/1
TABLET ORAL
COMMUNITY

## 2018-01-31 NOTE — PROGRESS NOTES
Assessment/Plan:    Problem List Items Addressed This Visit        Other    Postmenopausal bleeding - Primary     -   Now status post robotic assisted hysterectomy, bilateral salpingo-oophorectomy  Final pathology benign  She is recovering well from surgery  There is no evidence of complications  Vaginal cuff is healing well with the exception of some minimal granulation tissue which was treated with silver nitrate today  I plan to see her back in 2-3 weeks  I have encouraged her to resume all activities of daily living with the exception of vaginal intercourse  This should be delayed until complete healing of the vaginal cuff is documented at next visit  CHIEF COMPLAINT:    Presents today for postoperative follow-up  Patient ID: Talita Herring is a 59 y o  female  HPI      The following portions of the patient's history were reviewed and updated as appropriate: allergies, current medications, past family history, past medical history, past social history, past surgical history and problem list     Review of Systems   Genitourinary: Vaginal discharge:   Minimal vaginal spotting  All other systems reviewed and are negative  Current Outpatient Prescriptions:     Azelaic Acid (FINACEA) 15 % cream, Apply 1 application topically daily, Disp: , Rfl:     calcium polycarbophil (FIBER-CAPS) 625 mg tablet, Take by mouth, Disp: , Rfl:     Objective:    Blood pressure 128/82, pulse 72, temperature 97 7 °F (36 5 °C), temperature source Oral, resp  rate 16, height 5' 4" (1 626 m), weight 84 4 kg (186 lb), last menstrual period 10/03/2016  Physical Exam   Genitourinary:   Genitourinary Comments:  Vaginal cuff is well healed with the exception of an approximately 2 cm area of granulation tissue at the top of the vaginal cuff  There is no active bleeding  Area was treated with silver nitrate  Bimanual exam reveals no evidence of dehiscence            Case Report   Surgical Pathology Report                         Case: [id number]                                    Authorizing Provider: Cam Medley MD      Collected:           01/04/2018 0857               Ordering Location:     [address]      Received:            01/04/2018 [facility]                                      Hospital Operating Room                                                       Pathologist:           Ragini Elise MD                                                                 Specimen:    Uterus w/Bilateral Ovaries and Fallopian Tubes, cervix, uterus b/l tubes & ovaries         Final Diagnosis   A  Uterus, cervix, bilateral ovaries and fallopian tubes (146 g hysterectomy and bilateral salpingo-oophorectomy):     - Uterus: - Leiomyomata (subserosal, intramural and submucosal)  - Adenomyosis  - Proliferative endometrium with focal crowding; cannot exclude hyperplasia  - Cervix: Nabothian cyst formation      - Ovaries: Physiologic changes including enlarged cysts  - Fallopian tubes: Paratubal cyst formation      - No malignancy identified  Electronically signed by Ragini Elise MD on 1/8/2018 at 10:30 AM   Note   Interpretation performed at [facility], [facility], [address] [place] [id number]  Additional Information   All controls performed with the immunohistochemical stains reported above reacted appropriately  These tests were developed and their performance characteristics determined by [facility] or [facility]  They may not be cleared or approved by the [place]  Food and Drug Administration  The FDA has determined that such clearance or approval is not necessary  These tests are used for clinical purposes  They should not be regarded as investigational or for research  This laboratory has been approved by CLIA 88, designated as a high-complexity laboratory and is qualified to perform these tests  Gross Description   A  The specimen is received in formalin, labeled with the patient's name and hospital number, and is designated "uterus, cervix, bilateral tubes and ovaries  The specimen consists of a a 146 g uterus without attached adnexa  The serosa is tan-pink and smooth, and exhibits a focal area of bulging consistent with subserosal nodule  The ectocervix is white tan-pink and smooth  A patent slit shape cervical os is identified measuring 0 5 cm in greatest dimension  The uterus measures 9 6 cm superior to inferior by 5 8 cm cornu to cornu the by 5 9 cm anterior to posterior  The specimen is received previously bivalved and the cervix exhibits a few clear fluid filled cystic structures, the measuring up to 1 1 cm in greatest dimension  The endometrium exhibits a bicornuate cavity, which averages 0 2 cm in thickness, and measures 3 4 cm in width that fundus, and is focally distorted by an intramural nodule  A polyp is not identified  The myometrium exhibits multiple, (7), intramural, submucosal, and previously mentioned subserosal nodules which range from 0 3 cm to 1 6 cm in greatest dimension  These nodules have a homogeneous, white-tan, whorled cut surface, with no identifiable area of hemorrhage, and the 1 subserosal nodule is focally calcified  The also submitted in the container are 2 unoriented ovaries with attached fallopian tubes with fimbriated ends  Photographs are taken  The largest unoriented ovary exhibits a white-tan pink smooth outer surface and measures 3 3 x 3 0 x 1 6 cm, and upon cut section exhibits 2 smooth lined clear fluid-filled cystic structures measuring 0 2 cm and 1 0 cm in greatest dimension, and 1 orange tan cystic appearing structure measuring 0 6 cm in greatest dimension  The attached fallopian tube has a length of 6 5 cm and ranges from 0 4 cm to 0 6 cm in diameter and appears unremarkable    A small a white pink surface, and measures 3 0 x 2 6 x 1 5 cm, and upon cut section exhibits 1 smooth volume hemorrhagic fluid filled cystic structure measuring 1 8 x 1 3 x 0 7 cm  The attached fallopian tube with fimbriated has a length of 6 2 cm and ranges from 0 3 cm and 0 6 cm in diameter and is unremarkable  Representative sections  27 cassettes      1, 2:  Anterior cervix, complete section, with largest cystic structure in cassette 2  3:  Posterior cervix  4-7:  Left anterior endomyometrium with serosa, complete section and largest nodule in cassettes 4-5, and a complete section cassettes 6, 7, with additional section of largest nodule cassette 6  8-11:  Right anterior endomyometrium with serosa, complete section and nodule in cassette 8, complete section in cassettes 9-10 with nodule, and complete section in cassette 11  12: Additional random sections of nodules, including subserosal nodule, after decalcification in Decal II  13-16:  Left posterior endomyometrium with serosa, complete section and nodule in cassettes 13-14, and complete section in each cassette 15, 16  17-19:  Right posterior endomyometrium with serosa, complete section in cassette 17, and complete section with nodule in cassette 18 -19  20-22:  Largest unoriented ovary  23: Fallopian tube attached to largest ovary  24-26:  Smaller unoriented ovary  27: Fallopian tube attached to smaller ovary     Note: The estimated total formalin fixation time based upon information provided by the submitting clinician and the standard processing schedule is under 72 hours    MSequino        Clinical Information   Excessive and frequent menstruation with irregular cycle     Yazmin Falcon MD, Jennifer Pacheco, [facility]  1/31/2018  4:03 PM

## 2018-01-31 NOTE — ASSESSMENT & PLAN NOTE
-   Now status post robotic assisted hysterectomy, bilateral salpingo-oophorectomy  Final pathology benign  She is recovering well from surgery  There is no evidence of complications  Vaginal cuff is healing well with the exception of some minimal granulation tissue which was treated with silver nitrate today  I plan to see her back in 2-3 weeks  I have encouraged her to resume all activities of daily living with the exception of vaginal intercourse  This should be delayed until complete healing of the vaginal cuff is documented at next visit

## 2018-01-31 NOTE — PATIENT INSTRUCTIONS
-   Refrain from vaginal intercourse until next vaginal cuff examination  Otherwise, resume all activities without restrictions

## 2018-02-06 ENCOUNTER — TELEPHONE (OUTPATIENT)
Dept: GYNECOLOGIC ONCOLOGY | Facility: CLINIC | Age: 58
End: 2018-02-06

## 2018-02-15 PROBLEM — D25.9 UTERINE LEIOMYOMA: Status: ACTIVE | Noted: 2018-01-31

## 2018-02-19 ENCOUNTER — OFFICE VISIT (OUTPATIENT)
Dept: GYNECOLOGIC ONCOLOGY | Facility: CLINIC | Age: 58
End: 2018-02-19

## 2018-02-19 VITALS
RESPIRATION RATE: 14 BRPM | SYSTOLIC BLOOD PRESSURE: 120 MMHG | HEIGHT: 64 IN | HEART RATE: 72 BPM | DIASTOLIC BLOOD PRESSURE: 68 MMHG | BODY MASS INDEX: 32.5 KG/M2 | WEIGHT: 190.38 LBS | TEMPERATURE: 97.8 F

## 2018-02-19 DIAGNOSIS — D25.9 UTERINE LEIOMYOMA, UNSPECIFIED LOCATION: Primary | ICD-10-CM

## 2018-02-19 PROCEDURE — 99024 POSTOP FOLLOW-UP VISIT: CPT | Performed by: NURSE PRACTITIONER

## 2018-02-19 NOTE — PROGRESS NOTES
Assessment/Plan:    Problem List Items Addressed This Visit     Uterine leiomyoma - Primary    This is a 53-year-old who underwent the below-mentioned procedure on January 4, 2018  She has occasional light pink-tinged discharge, but is feeling well and is without pain  The vaginal cuff was without visible granulation tissue  The area was touched with silver nitrate  Her performance status is zero  The patient will return to the office on a PRN basis should she have any new or concerning symptoms that develop  She is aware to call the office right away with any abnormal bleeding, worsening pain, fevers, etc               CHIEF COMPLAINT: Postoperative evaluation      Subjective:     Problem:  Uterine leiomyoma, s/p hysterectomy on 1/4/2018  Previous therapy:  January 4, 2018: Robotic assisted hysterectomy, bilateral salpingo-oophorectomy  Final pathology benign  Patient ID: Wally Kehr is a 59 y o  female     This is a 59 y o  female last evaluated on January 31, 2018, who presents to the office for postoperative evaluation  She has been well in the interim and is without acute complaints  She denies nausea or vomiting  Her appetite is appropriate  She is voiding and moving her bowels without difficulty  She denies abdominal or pelvic pain  She has occasional light pink-tinged discharge  She reports attempting intercourse last week, but felt too sore to proceed  She is ambulatory  Review of Systems   Constitutional: Negative for chills, fatigue, fever and unexpected weight change  Respiratory: Negative for cough, chest tightness, shortness of breath and wheezing  Cardiovascular: Negative for chest pain, palpitations and leg swelling  Gastrointestinal: Negative for abdominal pain, constipation, diarrhea, nausea and vomiting  Genitourinary: Positive for vaginal discharge  Negative for difficulty urinating, dysuria, frequency, hematuria, pelvic pain, urgency and vaginal bleeding  Light, pink-tinged discharge, occasional   Skin: Negative for color change, pallor and rash  Neurological: Negative for dizziness, weakness, light-headedness and numbness  Psychiatric/Behavioral: Negative  Current Outpatient Prescriptions   Medication Sig Dispense Refill    Azelaic Acid (FINACEA) 15 % cream Apply 1 application topically daily      calcium polycarbophil (FIBER-CAPS) 625 mg tablet Take by mouth       No current facility-administered medications for this visit  Allergies   Allergen Reactions    Ceclor [Cefaclor] Rash    Ciprofloxacin Hcl Rash    Erythromycin Rash     Powder/Suspension    Flagyl [Metronidazole] Rash    Penicillins Rash    Bactrim [Sulfamethoxazole-Trimethoprim] Rash       Past Medical History:   Diagnosis Date    Left knee injury     Menometrorrhagia     Metrorrhagia     Mitral valve prolapse syndrome     leaflet syndrome    Osteopenia     Rosacea     Thyroid nodule     Nontoxic single       Past Surgical History:   Procedure Laterality Date     SECTION, LOW TRANSVERSE      COLONOSCOPY      CYSTOSCOPY N/A 2018    Procedure: CYSTOSCOPY;  Surgeon: Yazmin Falcon MD;  [facility];  Service: Gynecology Oncology    FRACTURE SURGERY Right     right wrist     POLYPECTOMY N/A 10/18/2016    Procedure: POLYPECTOMY;  Surgeon: Linsey Freeman DO;  [facility];  Service:     WV HYSTEROSCOPY,W/ENDO BX N/A 10/18/2016    Procedure: DILATATION AND CURETTAGE (D&C) WITH HYSTEROSCOPY;  Surgeon: Linsey Freeman DO;  [facility];  Service: Gynecology    WV LAPAROSCOPY W TOT HYSTERECTUTERUS <=250 GRAM  W TUBE/OVARY N/A 2018    Procedure: ROBOTIC HYSTERECTOMY, BSO ;  Surgeon: Yazmin Falcon MD;  [facility];  Service: Gynecology Oncology       OB History     No data available          No family history on file      The following portions of the patient's history were reviewed and updated as appropriate: allergies, current medications, past family history, past medical history, past social history, past surgical history and problem list       Objective:    Blood pressure 120/68, pulse 72, temperature 97 8 °F (36 6 °C), temperature source Oral, resp  rate 14, height 5' 4" (1 626 m), weight 86 4 kg (190 lb 6 oz), last menstrual period 10/03/2016  Physical Exam   Constitutional: She is oriented to person, place, and time  She appears well-developed and well-nourished  HENT:   Head: Normocephalic and atraumatic  Pulmonary/Chest: Effort normal  No respiratory distress  Abdominal: Soft  She exhibits no distension and no mass  There is no tenderness  There is no guarding  Genitourinary: Vagina normal  No vaginal discharge found  Genitourinary Comments: External genitalia without abnormality  There is no blood, discharge, or mass visualized in the vagina  Tissue at vaginal cuff slightly inflamed, but no granulation tissue  Area touched with silver nitrate  Patient tolerated well  Uterus and adnexa are surgically absent  Musculoskeletal: Normal range of motion  Neurological: She is alert and oriented to person, place, and time  She has normal reflexes  Skin: Skin is warm and dry  No rash noted  No erythema  No pallor  Psychiatric: She has a normal mood and affect   Her behavior is normal  Judgment and thought content normal          No results found for:   Lab Results   Component Value Date    WBC 5 99 12/21/2017    HGB 14 4 12/21/2017    HCT 42 8 12/21/2017    MCV 90 12/21/2017     12/21/2017     Lab Results   Component Value Date     12/21/2017    K 4 2 12/21/2017     12/21/2017    CO2 28 12/21/2017    ANIONGAP 6 12/21/2017    BUN 17 12/21/2017    CREATININE 0 74 12/21/2017    GLUCOSE 96 01/18/2016    GLUF 78 12/21/2017    CALCIUM 9 2 12/21/2017    AST 10 01/18/2016    ALT 16 01/18/2016    ALKPHOS 68 01/18/2016    PROT 6 5 01/18/2016    BILITOT 0 60 01/18/2016    EGFR 89 12/21/2017       Pathology:  Final Diagnosis   A  Uterus, cervix, bilateral ovaries and fallopian tubes (146 g hysterectomy and bilateral salpingo-oophorectomy):     - Uterus: - Leiomyomata (subserosal, intramural and submucosal)  - Adenomyosis  - Proliferative endometrium with focal crowding; cannot exclude hyperplasia  - Cervix: Nabothian cyst formation      - Ovaries: Physiologic changes including enlarged cysts  - Fallopian tubes: Paratubal cyst formation      - No malignancy identified

## 2018-02-19 NOTE — PATIENT INSTRUCTIONS
1  Return to the office PRN  2  Notify the office of any new or concerning symptoms    3  Resume routine GYN care with primary gynecologist

## 2018-02-19 NOTE — ASSESSMENT & PLAN NOTE
This is a 49-year-old who underwent the below-mentioned procedure on January 4, 2018  She has occasional light pink-tinged discharge, but is feeling well and is without pain  The vaginal cuff was without visible granulation tissue  The area was touched with silver nitrate  Her performance status is zero  The patient will return to the office on a PRN basis should she have any new or concerning symptoms that develop  She is aware to call the office right away with any abnormal bleeding, worsening pain, fevers, etc     I have cleared her to resume normal activity  I have asked to to refrain from vaginal intercourse for an additional week

## 2018-06-18 ENCOUNTER — TELEPHONE (OUTPATIENT)
Dept: INTERNAL MEDICINE CLINIC | Facility: CLINIC | Age: 58
End: 2018-06-18

## 2018-06-18 DIAGNOSIS — Z00.00 ROUTINE GENERAL MEDICAL EXAMINATION AT A HEALTH CARE FACILITY: Primary | ICD-10-CM

## 2018-06-18 NOTE — TELEPHONE ENCOUNTER
please let the patient know that the orders were placed into the [address] [place] [address] computer system for her blood work she should fast for 12 hours the night before please thank you

## 2018-06-18 NOTE — TELEPHONE ENCOUNTER
Pt scheduled her physical for 7/6  Please add required labs in her chart, including the a1c and lipid  Pt noted that she will also be sending you a message through [address]  I can call Pt back at [phone]

## 2018-06-19 DIAGNOSIS — M19.90 ARTHRITIS: Primary | ICD-10-CM

## 2018-06-20 ENCOUNTER — TRANSCRIBE ORDERS (OUTPATIENT)
Dept: ADMINISTRATIVE | Age: 58
End: 2018-06-20

## 2018-06-20 ENCOUNTER — APPOINTMENT (OUTPATIENT)
Dept: LAB | Age: 58
End: 2018-06-20

## 2018-06-20 ENCOUNTER — APPOINTMENT (OUTPATIENT)
Dept: LAB | Age: 58
End: 2018-06-20
Payer: COMMERCIAL

## 2018-06-20 DIAGNOSIS — Z00.8 HEALTH EXAMINATION IN POPULATION SURVEY: ICD-10-CM

## 2018-06-20 DIAGNOSIS — Z00.00 ROUTINE GENERAL MEDICAL EXAMINATION AT A HEALTH CARE FACILITY: ICD-10-CM

## 2018-06-20 DIAGNOSIS — M19.90 ARTHRITIS: ICD-10-CM

## 2018-06-20 DIAGNOSIS — Z00.8 HEALTH EXAMINATION IN POPULATION SURVEY: Primary | ICD-10-CM

## 2018-06-20 LAB
ALBUMIN SERPL BCP-MCNC: 3.8 G/DL (ref 3.5–5)
ALP SERPL-CCNC: 78 U/L (ref 46–116)
ALT SERPL W P-5'-P-CCNC: 23 U/L (ref 12–78)
ANION GAP SERPL CALCULATED.3IONS-SCNC: 8 MMOL/L (ref 4–13)
AST SERPL W P-5'-P-CCNC: 12 U/L (ref 5–45)
BASOPHILS # BLD AUTO: 0.04 THOUSANDS/ΜL (ref 0–0.1)
BASOPHILS NFR BLD AUTO: 1 % (ref 0–1)
BILIRUB SERPL-MCNC: 0.39 MG/DL (ref 0.2–1)
BILIRUB UR QL STRIP: NEGATIVE
BUN SERPL-MCNC: 22 MG/DL (ref 5–25)
CALCIUM SERPL-MCNC: 8.9 MG/DL (ref 8.3–10.1)
CHLORIDE SERPL-SCNC: 106 MMOL/L (ref 100–108)
CHOLEST SERPL-MCNC: 160 MG/DL (ref 50–200)
CLARITY UR: CLEAR
CO2 SERPL-SCNC: 25 MMOL/L (ref 21–32)
COLOR UR: YELLOW
CREAT SERPL-MCNC: 0.77 MG/DL (ref 0.6–1.3)
EOSINOPHIL # BLD AUTO: 0.15 THOUSAND/ΜL (ref 0–0.61)
EOSINOPHIL NFR BLD AUTO: 2 % (ref 0–6)
ERYTHROCYTE [DISTWIDTH] IN BLOOD BY AUTOMATED COUNT: 13.2 % (ref 11.6–15.1)
ERYTHROCYTE [SEDIMENTATION RATE] IN BLOOD: 7 MM/HOUR (ref 0–20)
EST. AVERAGE GLUCOSE BLD GHB EST-MCNC: 117 MG/DL
GFR SERPL CREATININE-BSD FRML MDRD: 85 ML/MIN/1.73SQ M
GLUCOSE P FAST SERPL-MCNC: 105 MG/DL (ref 65–99)
GLUCOSE UR STRIP-MCNC: NEGATIVE MG/DL
HBA1C MFR BLD: 5.7 % (ref 4.2–6.3)
HCT VFR BLD AUTO: 43.1 % (ref 34.8–46.1)
HDLC SERPL-MCNC: 48 MG/DL (ref 40–60)
HGB BLD-MCNC: 14.2 G/DL (ref 11.5–15.4)
HGB UR QL STRIP.AUTO: NEGATIVE
IMM GRANULOCYTES # BLD AUTO: 0.01 THOUSAND/UL (ref 0–0.2)
IMM GRANULOCYTES NFR BLD AUTO: 0 % (ref 0–2)
KETONES UR STRIP-MCNC: NEGATIVE MG/DL
LDLC SERPL CALC-MCNC: 78 MG/DL (ref 0–100)
LEUKOCYTE ESTERASE UR QL STRIP: NEGATIVE
LYMPHOCYTES # BLD AUTO: 2.01 THOUSANDS/ΜL (ref 0.6–4.47)
LYMPHOCYTES NFR BLD AUTO: 32 % (ref 14–44)
MCH RBC QN AUTO: 30.2 PG (ref 26.8–34.3)
MCHC RBC AUTO-ENTMCNC: 32.9 G/DL (ref 31.4–37.4)
MCV RBC AUTO: 92 FL (ref 82–98)
MONOCYTES # BLD AUTO: 0.48 THOUSAND/ΜL (ref 0.17–1.22)
MONOCYTES NFR BLD AUTO: 8 % (ref 4–12)
NEUTROPHILS # BLD AUTO: 3.62 THOUSANDS/ΜL (ref 1.85–7.62)
NEUTS SEG NFR BLD AUTO: 57 % (ref 43–75)
NITRITE UR QL STRIP: NEGATIVE
NONHDLC SERPL-MCNC: 112 MG/DL
NRBC BLD AUTO-RTO: 0 /100 WBCS
PH UR STRIP.AUTO: 6.5 [PH] (ref 4.5–8)
PLATELET # BLD AUTO: 303 THOUSANDS/UL (ref 149–390)
PMV BLD AUTO: 12.1 FL (ref 8.9–12.7)
POTASSIUM SERPL-SCNC: 3.8 MMOL/L (ref 3.5–5.3)
PROT SERPL-MCNC: 6.9 G/DL (ref 6.4–8.2)
PROT UR STRIP-MCNC: NEGATIVE MG/DL
RBC # BLD AUTO: 4.7 MILLION/UL (ref 3.81–5.12)
SODIUM SERPL-SCNC: 139 MMOL/L (ref 136–145)
SP GR UR STRIP.AUTO: 1.02 (ref 1–1.03)
TRIGL SERPL-MCNC: 169 MG/DL
UROBILINOGEN UR QL STRIP.AUTO: 0.2 E.U./DL
WBC # BLD AUTO: 6.31 THOUSAND/UL (ref 4.31–10.16)

## 2018-06-20 PROCEDURE — 86430 RHEUMATOID FACTOR TEST QUAL: CPT

## 2018-06-20 PROCEDURE — 36415 COLL VENOUS BLD VENIPUNCTURE: CPT

## 2018-06-20 PROCEDURE — 86618 LYME DISEASE ANTIBODY: CPT

## 2018-06-20 PROCEDURE — 83036 HEMOGLOBIN GLYCOSYLATED A1C: CPT

## 2018-06-20 PROCEDURE — 80053 COMPREHEN METABOLIC PANEL: CPT

## 2018-06-20 PROCEDURE — 81003 URINALYSIS AUTO W/O SCOPE: CPT | Performed by: INTERNAL MEDICINE

## 2018-06-20 PROCEDURE — 86038 ANTINUCLEAR ANTIBODIES: CPT

## 2018-06-20 PROCEDURE — 85652 RBC SED RATE AUTOMATED: CPT

## 2018-06-20 PROCEDURE — 85025 COMPLETE CBC W/AUTO DIFF WBC: CPT

## 2018-06-20 PROCEDURE — 80061 LIPID PANEL: CPT

## 2018-06-21 LAB
B BURGDOR IGG SER IA-ACNC: 0.08
B BURGDOR IGM SER IA-ACNC: 0.6
RHEUMATOID FACT SER QL LA: NEGATIVE

## 2018-06-22 ENCOUNTER — APPOINTMENT (OUTPATIENT)
Dept: LAB | Facility: HOSPITAL | Age: 58
End: 2018-06-22
Attending: INTERNAL MEDICINE
Payer: COMMERCIAL

## 2018-06-22 DIAGNOSIS — Z00.00 ROUTINE GENERAL MEDICAL EXAMINATION AT A HEALTH CARE FACILITY: ICD-10-CM

## 2018-06-22 LAB
HEMOCCULT STL QL IA: NEGATIVE
RYE IGE QN: NEGATIVE

## 2018-06-22 PROCEDURE — G0328 FECAL BLOOD SCRN IMMUNOASSAY: HCPCS

## 2018-07-06 ENCOUNTER — OFFICE VISIT (OUTPATIENT)
Dept: INTERNAL MEDICINE CLINIC | Facility: CLINIC | Age: 58
End: 2018-07-06
Payer: COMMERCIAL

## 2018-07-06 VITALS
DIASTOLIC BLOOD PRESSURE: 70 MMHG | HEIGHT: 64 IN | HEART RATE: 69 BPM | RESPIRATION RATE: 14 BRPM | SYSTOLIC BLOOD PRESSURE: 124 MMHG | WEIGHT: 186.4 LBS | BODY MASS INDEX: 31.82 KG/M2 | OXYGEN SATURATION: 96 % | TEMPERATURE: 98 F

## 2018-07-06 DIAGNOSIS — M19.042 PRIMARY OSTEOARTHRITIS OF BOTH HANDS: ICD-10-CM

## 2018-07-06 DIAGNOSIS — M19.041 PRIMARY OSTEOARTHRITIS OF BOTH HANDS: ICD-10-CM

## 2018-07-06 DIAGNOSIS — E66.9 OBESITY (BMI 30-39.9): ICD-10-CM

## 2018-07-06 DIAGNOSIS — E78.1 HYPERTRIGLYCERIDEMIA: Primary | ICD-10-CM

## 2018-07-06 DIAGNOSIS — Z00.00 EXAMINATION, GENERAL MEDICAL: ICD-10-CM

## 2018-07-06 DIAGNOSIS — E74.39 GLUCOSE INTOLERANCE: ICD-10-CM

## 2018-07-06 PROBLEM — N95.0 POSTMENOPAUSAL BLEEDING: Status: RESOLVED | Noted: 2018-01-04 | Resolved: 2018-07-06

## 2018-07-06 PROBLEM — D25.9 UTERINE LEIOMYOMA: Status: RESOLVED | Noted: 2018-01-31 | Resolved: 2018-07-06

## 2018-07-06 PROCEDURE — 99396 PREV VISIT EST AGE 40-64: CPT | Performed by: INTERNAL MEDICINE

## 2018-07-06 PROCEDURE — 93000 ELECTROCARDIOGRAM COMPLETE: CPT | Performed by: INTERNAL MEDICINE

## 2018-07-06 NOTE — ASSESSMENT & PLAN NOTE
Hypertriglyceridemia triglyceride value noted to be 169 recommend reduction of body weight decreased calorie consumption as well as special effort to reduce carbohydrate consumption follow-up study requested for 6 months

## 2018-07-06 NOTE — ASSESSMENT & PLAN NOTE
A fasting glucose is noted to be 105 recommend follow-up study in 6 months  Recommend the patient work on decreasing carbohydrate consumption maintaining regular exercise 5-6 days a week and attempting to reduce weight through weight watchers plan

## 2018-07-06 NOTE — ASSESSMENT & PLAN NOTE
In this general physical examination day we have reviewed the patient's electrocardiogram which appears to be normal and stable performed general physical examination which also appears to be normal with the exception of arthritic changes which are early in her hands  We reviewed in detail with the patient her laboratory testing today  Recommend that she strive to reduce her calories their intake every day as well as maintain regular exercise  Follow-up blood testing will be requested for both a fasting glucose as well as lipid profile in 6 months  The patient has undergone a total hysterectomy and oophorectomy earlier this year the question of HRT was briefly discussed I do not favor using HRT is a believe the patient is not having any significant symptoms from the hysterectomy    She will discuss this further with her gynecologist

## 2018-07-06 NOTE — ASSESSMENT & PLAN NOTE
Obesity the patient's body mass index falls into a reading of 32 00 the benefits of weight loss on blood sugar as well as triglyceride control reviewed with the patient as well as its benefit on general health and well-being

## 2018-07-06 NOTE — PROGRESS NOTES
Assessment/Plan:    Primary osteoarthritis of both hands  Primary osteoarthritis of both hands workup includes a negative Lyme testing as well as negative rheumatoid arthritis workup  Early Heberden's nodules are noted at the end of several fingers proximal joints do not seem to be inflamed or swollen at this time  Will continue to monitor if symptoms worsen would consider x-rays of the hands to evaluate to degree of arthritic changes  Glucose intolerance  A fasting glucose is noted to be 105 recommend follow-up study in 6 months  Recommend the patient work on decreasing carbohydrate consumption maintaining regular exercise 5-6 days a week and attempting to reduce weight through weight watchers plan  Hypertriglyceridemia  Hypertriglyceridemia triglyceride value noted to be 169 recommend reduction of body weight decreased calorie consumption as well as special effort to reduce carbohydrate consumption follow-up study requested for 6 months  Obesity (BMI 30-39  9)  Obesity the patient's body mass index falls into a reading of 32 00 the benefits of weight loss on blood sugar as well as triglyceride control reviewed with the patient as well as its benefit on general health and well-being  Examination, general medical  In this general physical examination day we have reviewed the patient's electrocardiogram which appears to be normal and stable performed general physical examination which also appears to be normal with the exception of arthritic changes which are early in her hands  We reviewed in detail with the patient her laboratory testing today  Recommend that she strive to reduce her calories their intake every day as well as maintain regular exercise  Follow-up blood testing will be requested for both a fasting glucose as well as lipid profile in 6 months      The patient has undergone a total hysterectomy and oophorectomy earlier this year the question of HRT was briefly discussed I do not favor using HRT is a believe the patient is not having any significant symptoms from the hysterectomy  She will discuss this further with her gynecologist        Diagnoses and all orders for this visit:    Hypertriglyceridemia  -     POCT ECG  -     Lipid panel; Future    Glucose intolerance  -     Comprehensive metabolic panel; Future    Obesity (BMI 30-39  9)    Other orders  -     econazole nitrate 1 % cream;         Subjective:      Patient ID: Yesica Simon is a 59 y o  female  This 49-year-old female patient presents today for an annual health maintenance evaluation including laboratory testing electrocardiogram and physical examination  Since her last visit with us she has undergone a abdominal hysterectomy  She also has developed early symptoms and signs of osteoarthritis of her hands  THe arthritic symptoms are mild and the patient does not request any pain medication at this time  It is unclear if there is any family history of arthritis  The following portions of the patient's history were reviewed and updated as appropriate: She  has a past medical history of Left knee injury; Menometrorrhagia; Metrorrhagia; Mitral valve prolapse syndrome; Osteopenia; Rosacea; and Thyroid nodule  She  has a past surgical history that includes  section, low transverse; Colonoscopy; Fracture surgery (Right); pr hysteroscopy,w/endo bx (N/A, 10/18/2016); Polypectomy (N/A, 10/18/2016); pr laparoscopy w tot hysterectuterus <=250 gram  w tube/ovary (N/A, 2018); CYSTOSCOPY (N/A, 2018); and Dilation and curettage of uterus  Her family history includes Diabetes in her father and mother; Heart disease in her father and mother; Hypertension in her family; Stroke in her mother; Thyroid disease in her mother  She  reports that she quit smoking about 18 years ago  She has never used smokeless tobacco  She reports that she drinks alcohol  She reports that she does not use drugs       Review of Systems Constitutional: Negative  HENT: Negative  Eyes: Negative  Respiratory: Negative  Cardiovascular: Negative  Gastrointestinal: Negative  Endocrine: Negative  Genitourinary: Negative  Musculoskeletal: Positive for arthralgias, joint swelling and myalgias  Allergic/Immunologic: Negative  Neurological: Negative  Hematological: Negative  Psychiatric/Behavioral: Negative  Objective:      /70   Pulse 69   Temp 98 °F (36 7 °C)   Resp 14   Ht 5' 4" (1 626 m)   Wt 84 6 kg (186 lb 6 4 oz)   LMP 10/03/2016 (Approximate)   SpO2 96%   BMI 32 00 kg/m²          Physical Exam   Constitutional: She is oriented to person, place, and time  Vital signs are normal  She appears well-developed and well-nourished  She is cooperative  No distress  HENT:   Head: Normocephalic and atraumatic  Right Ear: Hearing, tympanic membrane, external ear and ear canal normal    Left Ear: Hearing, tympanic membrane, external ear and ear canal normal    Nose: Nose normal  No mucosal edema  Mouth/Throat: Uvula is midline, oropharynx is clear and moist and mucous membranes are normal  No oropharyngeal exudate  Eyes: Conjunctivae and lids are normal  Pupils are equal, round, and reactive to light  Right eye exhibits no discharge  Left eye exhibits no discharge  No scleral icterus  Neck: No JVD present  Carotid bruit is not present  No tracheal deviation present  No thyromegaly present  Cardiovascular: Normal rate, regular rhythm, normal heart sounds and intact distal pulses  No murmur heard  Pulmonary/Chest: Effort normal and breath sounds normal  No stridor  No respiratory distress  She has no wheezes  She has no rales  She exhibits no tenderness  Abdominal: Soft  Normal appearance and bowel sounds are normal  She exhibits no distension and no mass  There is no tenderness  There is no rebound and no guarding  Musculoskeletal: Normal range of motion  She exhibits tenderness   She exhibits no edema or deformity  Lymphadenopathy:     She has no cervical adenopathy  Neurological: She is alert and oriented to person, place, and time  She has normal reflexes  She displays normal reflexes  No cranial nerve deficit  She exhibits normal muscle tone  Coordination normal    Skin: Skin is warm, dry and intact  No rash noted  She is not diaphoretic  No erythema  No pallor  Psychiatric: She has a normal mood and affect  Her speech is normal and behavior is normal  Judgment and thought content normal  Cognition and memory are normal    Vitals reviewed

## 2018-07-06 NOTE — ASSESSMENT & PLAN NOTE
Primary osteoarthritis of both hands workup includes a negative Lyme testing as well as negative rheumatoid arthritis workup  Early Heberden's nodules are noted at the end of several fingers proximal joints do not seem to be inflamed or swollen at this time  Will continue to monitor if symptoms worsen would consider x-rays of the hands to evaluate to degree of arthritic changes

## 2018-10-01 DIAGNOSIS — Z12.31 ENCOUNTER FOR SCREENING MAMMOGRAM FOR MALIGNANT NEOPLASM OF BREAST: Primary | ICD-10-CM

## 2018-10-01 DIAGNOSIS — Z78.0 MENOPAUSE: ICD-10-CM

## 2018-10-17 ENCOUNTER — CLINICAL SUPPORT (OUTPATIENT)
Dept: INTERNAL MEDICINE CLINIC | Facility: CLINIC | Age: 58
End: 2018-10-17
Payer: COMMERCIAL

## 2018-10-17 DIAGNOSIS — Z23 NEED FOR SHINGLES VACCINE: Primary | ICD-10-CM

## 2018-10-17 PROCEDURE — 90750 HZV VACC RECOMBINANT IM: CPT

## 2018-10-24 ENCOUNTER — ANNUAL EXAM (OUTPATIENT)
Dept: OBGYN CLINIC | Facility: CLINIC | Age: 58
End: 2018-10-24
Payer: COMMERCIAL

## 2018-10-24 VITALS
SYSTOLIC BLOOD PRESSURE: 120 MMHG | BODY MASS INDEX: 34.2 KG/M2 | HEIGHT: 63 IN | DIASTOLIC BLOOD PRESSURE: 70 MMHG | WEIGHT: 193 LBS

## 2018-10-24 DIAGNOSIS — Z01.419 ENCOUNTER FOR GYNECOLOGICAL EXAMINATION WITHOUT ABNORMAL FINDING: Primary | ICD-10-CM

## 2018-10-24 PROCEDURE — 99396 PREV VISIT EST AGE 40-64: CPT | Performed by: OBSTETRICS & GYNECOLOGY

## 2018-10-24 NOTE — PROGRESS NOTES
Joseph Clark   1960    CC:  Yearly exam    S:  59 y o  female here for yearly exam  She is postmenopausal and has had no vaginal bleeding  She denies vaginal discharge, itching, odor or dryness  She is sexually active  She had a hysterectomy in 1/2018 for presumed postmenopausal bleeding but reports that she has had hot flashes since her hysterectomy  They are improving in intensity and frequency  She does feel as though she runs a few degrees warmer since then but this does not bother her  She would prefer to avoid medication  We discussed that she is an ideal candidate for estrogen-only transdermal HRT if she requires it  However, there is no strong indication as long as she is tolerating her symptoms well  She reports noticing her arthritis more since her surgery but she is unsure if this is a consequence or a coincidence  Reassured likely coincidence  She is also having thigh cramps at night since surgery - noticeable but tolerable  She has some difficulty with incomplete voiding at times - discussed double voiding  She will try this  Last Pap 8/22/2017 - endometrial cells; s/p hyst  Last Mammo 8/23/2017 - BIRAD-2  Last DEXA 2/6/2015 - normal  Last Colonoscopy 2 years ago per patient; normal; due every 5 years      Current Outpatient Prescriptions:     Azelaic Acid (FINACEA) 15 % cream, Apply 1 application topically daily, Disp: , Rfl:     calcium polycarbophil (FIBER-CAPS) 625 mg tablet, Take by mouth, Disp: , Rfl:     econazole nitrate 1 % cream, , Disp: , Rfl:   Social History     Social History    Marital status: /Civil Union     Spouse name: N/A    Number of children: N/A    Years of education: N/A     Occupational History    Not on file       Social History Main Topics    Smoking status: Former Smoker     Quit date: 2000    Smokeless tobacco: Never Used      Comment: Quit    Alcohol use Yes      Comment: socially    Drug use: No    Sexual activity: Yes Partners: Male     Birth control/ protection: Post-menopausal, Surgical      Comment:      Other Topics Concern    Not on file     Social History Narrative    Works full time         Family History   Problem Relation Age of Onset    Diabetes Mother     Heart disease Mother     Stroke Mother     Thyroid disease Mother     Diabetes Father     Heart disease Father     Hypertension Family     Uterine cancer Sister     Thyroid cancer Sister     No Known Problems Brother     No Known Problems Brother     No Known Problems Brother     No Known Problems Sister      Past Medical History:   Diagnosis Date    Left knee injury     grade 1 injury of medial collateral ligament of left knee, sprain  of MCL joint    Menometrorrhagia     Metrorrhagia     Mitral valve prolapse syndrome     leaflet syndrome    Osteopenia     Rosacea     Thyroid nodule     Nontoxic single        O:  Blood pressure 120/70, height 5' 3" (1 6 m), weight 87 5 kg (193 lb), last menstrual period 10/03/2016  Patient appears well and is not in distress  Neck is supple without masses  Breasts are symmetrical without mass, tenderness, nipple discharge, skin changes or adenopathy  Abdomen is soft and nontender without masses  External genitals are normal without lesions or rashes  Vagina is normal without discharge or bleeding  Cervix is surgically absent  Uterus is surgically absent  Adnexa are surgically absent, without palpable mass  A:  Yearly exam      P:  RTO one year for yearly exam or sooner as needed  Mammogram and DEXA slips given  Return for annual or prn

## 2018-11-09 ENCOUNTER — HOSPITAL ENCOUNTER (OUTPATIENT)
Dept: RADIOLOGY | Age: 58
Discharge: HOME | End: 2018-11-09
Payer: COMMERCIAL

## 2018-11-09 DIAGNOSIS — Z12.31 ENCOUNTER FOR SCREENING MAMMOGRAM FOR MALIGNANT NEOPLASM OF BREAST: ICD-10-CM

## 2018-11-09 DIAGNOSIS — Z78.0 MENOPAUSE: ICD-10-CM

## 2018-11-09 PROCEDURE — 77080 DXA BONE DENSITY AXIAL: CPT

## 2018-11-09 PROCEDURE — 77067 SCR MAMMO BI INCL CAD: CPT

## 2019-01-15 ENCOUNTER — CLINICAL SUPPORT (OUTPATIENT)
Dept: INTERNAL MEDICINE CLINIC | Facility: CLINIC | Age: 59
End: 2019-01-15
Payer: COMMERCIAL

## 2019-01-15 DIAGNOSIS — Z23 ENCOUNTER FOR IMMUNIZATION: Primary | ICD-10-CM

## 2019-01-15 PROCEDURE — 90471 IMMUNIZATION ADMIN: CPT | Performed by: INTERNAL MEDICINE

## 2019-01-15 PROCEDURE — 90750 HZV VACC RECOMBINANT IM: CPT | Performed by: INTERNAL MEDICINE

## 2019-03-19 ENCOUNTER — HOSPITAL ENCOUNTER (OUTPATIENT)
Dept: RADIOLOGY | Facility: HOSPITAL | Age: 59
Discharge: HOME | End: 2019-03-19
Attending: ORTHOPAEDIC SURGERY
Payer: COMMERCIAL

## 2019-03-19 ENCOUNTER — TELEPHONE (OUTPATIENT)
Dept: OBGYN CLINIC | Facility: HOSPITAL | Age: 59
End: 2019-03-19

## 2019-03-19 DIAGNOSIS — R52 PAIN: Primary | ICD-10-CM

## 2019-03-19 DIAGNOSIS — R52 PAIN: ICD-10-CM

## 2019-03-19 DIAGNOSIS — D48.1 TENDON SHEATH GIANT CELL TUMOR: Primary | ICD-10-CM

## 2019-03-19 PROCEDURE — 73140 X-RAY EXAM OF FINGER(S): CPT

## 2019-03-19 NOTE — TELEPHONE ENCOUNTER
Mony Irene has CC of firm mass base of right thumb on ulnar side  She is right hand dominate  She denies any history of right thumb trauma  PE: firm palpable mass that is round and not moveable  No palpable tenderness  Located at base of proximal phalanx  NV exam grossly intact  X- ray reveals soft tissue mass in area of the base of the right thumb  Impression: ganglion cyst versus GCT of tendon sheath  Not likely a neuroma      Plan: MRI follow up with Dr Gianna Crawford on Friday

## 2019-03-22 ENCOUNTER — HOSPITAL ENCOUNTER (OUTPATIENT)
Dept: RADIOLOGY | Facility: HOSPITAL | Age: 59
Discharge: HOME | End: 2019-03-22
Payer: COMMERCIAL

## 2019-03-22 ENCOUNTER — OFFICE VISIT (OUTPATIENT)
Dept: OBGYN CLINIC | Facility: HOSPITAL | Age: 59
End: 2019-03-22
Payer: COMMERCIAL

## 2019-03-22 VITALS
DIASTOLIC BLOOD PRESSURE: 72 MMHG | BODY MASS INDEX: 34.8 KG/M2 | WEIGHT: 196.4 LBS | HEIGHT: 63 IN | HEART RATE: 71 BPM | SYSTOLIC BLOOD PRESSURE: 113 MMHG

## 2019-03-22 DIAGNOSIS — M67.40 GANGLION CYST: ICD-10-CM

## 2019-03-22 DIAGNOSIS — M19.042 PRIMARY OSTEOARTHRITIS OF BOTH HANDS: Primary | ICD-10-CM

## 2019-03-22 DIAGNOSIS — D48.1 TENDON SHEATH GIANT CELL TUMOR: ICD-10-CM

## 2019-03-22 DIAGNOSIS — M19.041 PRIMARY OSTEOARTHRITIS OF BOTH HANDS: Primary | ICD-10-CM

## 2019-03-22 PROCEDURE — 73218 MRI UPPER EXTREMITY W/O DYE: CPT

## 2019-03-22 PROCEDURE — 99213 OFFICE O/P EST LOW 20 MIN: CPT | Performed by: ORTHOPAEDIC SURGERY

## 2019-03-22 NOTE — PROGRESS NOTES
ASSESSMENT/PLAN:    Assessment:   RIGHT THUMB ULNAR METACARPOPHALANGEAL JOINT GANGLION CYST AND RIGHT THUMB CARPOMETACARPAL JOINT OSTEOARTHRITIS    Plan: At this point, we offered operative versus non operative interventions as well as splinting, medications  At this point after education, patient has elected to continue conservative management  She will contact the office if any problems arise  To Do Next Visit:        General Discussions:  Aia 16 Arthritis: The anatomy and physiology of carpometacarpal joint arthritis was discussed with the patient today in the office  Deterioration of the articular cartilage eventually leads to hypermobility at the thumb Aia 16 joint, resulting in joint subluxation, osteophyte formation, cystic changes within the trapezium and base of the first metacarpal, as well as subchondral sclerosis  Eventually, pain, limited mobility, and compensatory hyperextension at the metacarpophalangeal joint may develop  While normal activity and usage of the thumb joint may provide a painful experience to the patient, this typically does not result in damage to the thumb or hand  Treatment options include resting thumb spica splints to decreased joint edema, pain, and inflammation  Therapy exercises to strengthen the thenar musculature may relieve pain, but do not alter the overall continued development of osteoarthritis  Oral medications, topical medications, corticosteroid injections may decrease pain and increase overall function  Eventually, approximately 5% of patients may require surgical intervention  Ganglion Cysts: The anatomy and physiology of the ganglion was discussed with the patient today in the office  Fluid leaking out of the joint surface typically creates a small sac, which can enlarge and cause pain or limitation of motion  Treatment options include observation, aspiration, or surgical incision were discussed with the patient today    Observation typically lead to resolution and approximately 10% of patients, aspiration least resolution approximately 50% of patients, and surgical excision lead to resolution in approximately 97% of patients  After discussion with the patient today, the patient voiced understanding of all treatment options  Operative Discussions:         _____________________________________________________  CHIEF COMPLAINT:  Chief Complaint   Patient presents with    Right Thumb - Pain         SUBJECTIVE:  Moses Rausch is a 59y o  year old female who presents for evaluation of right thumb CMC joint pain with occasional numbness into the pad of the thumb at the thenar space as well as a small mass on the ulnar aspect of the metacarpophalangeal joint which is nonpainful  She reports no previous injury  Pain is worse with opening objects  She does not have pain that awakens her from sleep at night        PAST MEDICAL HISTORY:  Past Medical History:   Diagnosis Date    Left knee injury     grade 1 injury of medial collateral ligament of left knee, sprain  of MCL joint    Menometrorrhagia     Metrorrhagia     Mitral valve prolapse syndrome     leaflet syndrome    Osteopenia     Rosacea     Thyroid nodule     Nontoxic single       PAST SURGICAL HISTORY:  Past Surgical History:   Procedure Laterality Date     SECTION, LOW TRANSVERSE      COLONOSCOPY      CYSTOSCOPY N/A 2018    Procedure: CYSTOSCOPY;  Surgeon: Carolynn Low MD;  [facility];  Service: Gynecology Oncology    DILATION AND CURETTAGE OF UTERUS      FRACTURE SURGERY Right     right wrist     HYSTERECTOMY      POLYPECTOMY N/A 10/18/2016    Procedure: POLYPECTOMY;  Surgeon: Cheko Huynh DO;  [facility];  Service:     TX HYSTEROSCOPY,W/ENDO BX N/A 10/18/2016    Procedure: DILATATION AND CURETTAGE (D&C) WITH HYSTEROSCOPY;  Surgeon: Cheko Huynh DO;  [facility];  Service: Gynecology    TX LAPAROSCOPY W TOT HYSTERECTUTERUS <=250 Erika Leann TUBE/OVARY N/A 2018    Procedure: ROBOTIC HYSTERECTOMY, BSO ;  Surgeon: Zion Trujillo MD;  [facility];  Service: Gynecology Oncology       FAMILY HISTORY:  Family History   Problem Relation Age of Onset    Diabetes Mother     Heart disease Mother     Stroke Mother     Thyroid disease Mother     Diabetes Father     Heart disease Father     Hypertension Family     Uterine cancer Sister     Thyroid cancer Sister     No Known Problems Brother     No Known Problems Brother     No Known Problems Brother     No Known Problems Sister        SOCIAL HISTORY:  Social History     Tobacco Use    Smoking status: Former Smoker     Last attempt to quit: 2000     Years since quittin 2    Smokeless tobacco: Never Used    Tobacco comment: Quit   Substance Use Topics    Alcohol use: Yes     Comment: socially    Drug use: No       MEDICATIONS:    Current Outpatient Medications:     Azelaic Acid (FINACEA) 15 % cream, Apply 1 application topically daily, Disp: , Rfl:     calcium polycarbophil (FIBER-CAPS) 625 mg tablet, Take by mouth, Disp: , Rfl:     econazole nitrate 1 % cream, , Disp: , Rfl:     ALLERGIES:  Allergies   Allergen Reactions    Ceclor [Cefaclor] Rash    Ciprofloxacin Hcl Rash    Erythromycin Rash     Powder/Suspension    Flagyl [Metronidazole] Rash    Penicillins Rash    Bactrim [Sulfamethoxazole-Trimethoprim] Rash       REVIEW OF SYSTEMS:  Pertinent items are noted in HPI  A comprehensive review of systems was negative      LABS:  HgA1c:   Lab Results   Component Value Date    HGBA1C 5 7 2018     BMP:   Lab Results   Component Value Date    GLUCOSE 89 2014    CALCIUM 8 9 2018     2014    K 3 8 2018    CO2 25 2018     2018    BUN 22 2018    CREATININE 0 77 2018       _____________________________________________________  PHYSICAL EXAMINATION:  General: well developed and well nourished, alert, oriented times 3 and appears comfortable  Psychiatric: Normal  HEENT: Trachea Midline, No torticollis  Cardiovascular: No discernable arrhythmia  Pulmonary: No wheezing or stridor  Skin: Mass localized to the ulnar aspect of the thumb metacarpophalangeal joint measuring 3 mm x 3 mm, soft, compressible, not attached to the soft tissue, No Erythema, No Lacerations, No Fluctuation, No Ulcerations  Neurovascular: Sensation Intact to the Median, Ulnar, Radial Nerve, Motor Intact to the Median, Ulnar, Radial Nerve, Pulses Intact and Negative Tinel's to the carpal tunnel and no evidence of thenar atrophy    MUSCULOSKELETAL EXAMINATION:  Extremities:  Tenderness to palpation right thumb CMC joint with positive grind test   No crepitation, no hyperextension at the metacarpophalangeal joint, no instability at the metacarpophalangeal joint to ulnar or radial stress testing  Minimal shoulder sign noted  _____________________________________________________  STUDIES REVIEWED:  MRI of the thumb was reviewed today by myself and demonstrates a small ganglion cyst localized to the ulnar aspect of the thumb        PROCEDURES PERFORMED:  Procedures  No Procedures performed today

## 2019-03-29 ENCOUNTER — EVALUATION (OUTPATIENT)
Dept: OCCUPATIONAL THERAPY | Age: 59
End: 2019-03-29
Payer: COMMERCIAL

## 2019-03-29 DIAGNOSIS — M19.90 ARTHRITIS: Primary | ICD-10-CM

## 2019-03-29 PROCEDURE — 97165 OT EVAL LOW COMPLEX 30 MIN: CPT

## 2019-03-29 NOTE — PROGRESS NOTES
OT Evaluation     Today's date: 3/29/2019  Patient name: Jovi Brandt  : 1960  MRN: [id number]  Referring provider: Farooq Doty MD  Dx:   Encounter Diagnosis     ICD-10-CM    1  Arthritis M19 90                   Assessment  Assessment details: Patient was seen today for education in a home program for [facility] jt arthritis  She has been experiencing pain in her fingers and thumbs  The left side is worst   We tried some paraffin today and I gave her a trial of home paraffin glove treatments  We talked about home exercises for stretching and thumb stability  I gave her exercises to help stretch her web spaces and discussed some joint protection principles  I also provided her with information on [facility] comfort cool braces that she can try to wear during the day  She was not interested in night time splints at the present time  I applied kinesiotape to the left [facility] and educated her how to apply if she finds this helpful  She is not interested in ongoing therapy and will contact  Me if she has any further concerns  Impairments: lacks appropriate home exercise program and pain with function    Plan  Planned modality interventions: thermotherapy: paraffin bath  Planned therapy interventions: patient education, manual therapy, stretching, strengthening and home exercise program        Subjective Evaluation    History of Present Illness  Mechanism of injury: Patient referred to hand therapy with bilateral hand arthritis  She has been experiencing pain for some time now  She has generalized finger stiffness  And bilateral CMC jt soreness/pain with left hand worst than right  She is trying to be proactive and is interested in ways to manage her condition  Review of MRI also revealed a ganglion cyst ulnar aspect of left MP jt  She uses ibuprofen as needed for pain     Quality of life: good    Pain  Current pain rating: 3  At best pain ratin  At worst pain ratin    Patient Goals  Patient goals for therapy: decreased pain  Patient goal: manage her pain , learn exercises to help her hands  Objective     Tenderness     Left Wrist/Hand   Tenderness in the Aia 16  Right Wrist/Hand   Tenderness in the carpometacarpal joint       Additional Tenderness Details  Discomfort with CMC compression/grind test   Fairly good MP jt stability in both hands    Active Range of Motion     Left Wrist   Normal active range of motion    Right Wrist   Normal active range of motion    Strength/Myotome Testing     Left Wrist/Hand      (2nd hand position)     Trial 1: 20    Trial 2: 20    Trial 3: 25    Thumb Strength  Key/Lateral Pinch     Trail 1: 12  Tip/Two-Point Pinch     Trial 1: 8  Palmar/Three-Point Pinch     Trial 1: 8    Right Wrist/Hand      (2nd hand position)     Trial 1: 25    Trial 2: 20    Trial 3: 25    Thumb Strength   Key/Lateral Pinch     Trial 1: 18  Tip/Two-Point Pinch     Trial 1: 8  Palmar/Three-Point Pinch     Trial 1: 8    Additional Strength Details  Pain with pinch strength testing          Precautions: none    Daily Treatment Diary     Manual                                                                                   Exercise Diary                                                                                                                                                                                                                                                                                      Modalities

## 2019-03-29 NOTE — LETTER
2019    MD Roland Larson [place] [address]    Patient: Fallon Mi   YOB: 1960   Date of Visit: 3/29/2019     Encounter Diagnosis     ICD-10-CM    1  Arthritis M19 90 OT Plan of Care Cert/Re-cert       Dear Dr Justo Hernández:    Please review the attached Plan of Care from Gisel Dinero's recent visit  Please verify that you agree therapy should continue by signing the attached document and sending it back to our office  If you have any questions or concerns, please don't hesitate to call  Sincerely,    Gee Wheeler, OT      Referring Provider:     I certify that I have read the below Plan of Care and certify the need for these services furnished under this plan of treatment while under my care  MD Roland Larson [address] [place] [address] In [place]        OT Evaluation     Today's date: 3/29/2019  Patient name: Fallon Mi  : 1960  MRN: [id number]  Referring provider: Jovani Sterling MD  Dx:   Encounter Diagnosis     ICD-10-CM    1  Arthritis M19 90                   Assessment  Assessment details: Patient was seen today for education in a home program for [facility] jt arthritis  She has been experiencing pain in her fingers and thumbs  The left side is worst   We tried some paraffin today and I gave her a trial of home paraffin glove treatments  We talked about home exercises for stretching and thumb stability  I gave her exercises to help stretch her web spaces and discussed some joint protection principles  I also provided her with information on [facility] comfort cool braces that she can try to wear during the day  She was not interested in night time splints at the present time  I applied kinesiotape to the left [facility] and educated her how to apply if she finds this helpful  She is not interested in ongoing therapy and will contact  Me if she has any further concerns     Impairments: lacks appropriate home exercise program and pain with function    Plan  Planned modality interventions: thermotherapy: paraffin bath  Planned therapy interventions: patient education, manual therapy, stretching, strengthening and home exercise program        Subjective Evaluation    History of Present Illness  Mechanism of injury: Patient referred to hand therapy with bilateral hand arthritis  She has been experiencing pain for some time now  She has generalized finger stiffness  And bilateral CMC jt soreness/pain with left hand worst than right  She is trying to be proactive and is interested in ways to manage her condition  Review of MRI also revealed a ganglion cyst ulnar aspect of left MP jt  She uses ibuprofen as needed for pain  Quality of life: good    Pain  Current pain rating: 3  At best pain ratin  At worst pain ratin    Patient Goals  Patient goals for therapy: decreased pain  Patient goal: manage her pain , learn exercises to help her hands  Objective     Tenderness     Left Wrist/Hand   Tenderness in the [facility]  Right Wrist/Hand   Tenderness in the carpometacarpal joint       Additional Tenderness Details  Discomfort with CMC compression/grind test   Fairly good MP jt stability in both hands    Active Range of Motion     Left Wrist   Normal active range of motion    Right Wrist   Normal active range of motion    Strength/Myotome Testing     Left Wrist/Hand      (2nd hand position)     Trial 1: 20    Trial 2: 20    Trial 3: 25    Thumb Strength  Key/Lateral Pinch     Trail 1: 12  Tip/Two-Point Pinch     Trial 1: 8  Palmar/Three-Point Pinch     Trial 1: 8    Right Wrist/Hand      (2nd hand position)     Trial 1: 25    Trial 2: 20    Trial 3: 25    Thumb Strength   Key/Lateral Pinch     Trial 1: 18  Tip/Two-Point Pinch     Trial 1: 8  Palmar/Three-Point Pinch     Trial 1: 8    Additional Strength Details  Pain with pinch strength testing          Precautions: none    Daily Treatment Diary Manual                                                                                   Exercise Diary                                                                                                                                                                                                                                                                                      Modalities

## 2019-08-01 ENCOUNTER — TRANSCRIBE ORDERS (OUTPATIENT)
Dept: LAB | Facility: HOSPITAL | Age: 59
End: 2019-08-01

## 2019-08-01 ENCOUNTER — APPOINTMENT (OUTPATIENT)
Dept: LAB | Facility: HOSPITAL | Age: 59
End: 2019-08-01
Attending: INTERNAL MEDICINE

## 2019-08-01 DIAGNOSIS — Z01.84 IMMUNITY STATUS TESTING: ICD-10-CM

## 2019-08-01 DIAGNOSIS — E74.39 ACQUIRED MONOSACCHARIDE MALABSORPTION: ICD-10-CM

## 2019-08-01 DIAGNOSIS — Z11.1 SCREENING EXAMINATION FOR PULMONARY TUBERCULOSIS: ICD-10-CM

## 2019-08-01 DIAGNOSIS — E78.1 PURE HYPERGLYCERIDEMIA: Primary | ICD-10-CM

## 2019-08-01 DIAGNOSIS — E78.1 PURE HYPERGLYCERIDEMIA: ICD-10-CM

## 2019-08-01 LAB
ALBUMIN SERPL BCP-MCNC: 3.8 G/DL (ref 3.5–5)
ALP SERPL-CCNC: 94 U/L (ref 46–116)
ALT SERPL W P-5'-P-CCNC: 19 U/L (ref 12–78)
ANION GAP SERPL CALCULATED.3IONS-SCNC: 4 MMOL/L (ref 4–13)
AST SERPL W P-5'-P-CCNC: 9 U/L (ref 5–45)
BILIRUB SERPL-MCNC: 0.39 MG/DL (ref 0.2–1)
BUN SERPL-MCNC: 13 MG/DL (ref 5–25)
CALCIUM SERPL-MCNC: 8.6 MG/DL (ref 8.3–10.1)
CHLORIDE SERPL-SCNC: 108 MMOL/L (ref 100–108)
CHOLEST SERPL-MCNC: 170 MG/DL (ref 50–200)
CO2 SERPL-SCNC: 28 MMOL/L (ref 21–32)
CREAT SERPL-MCNC: 0.67 MG/DL (ref 0.6–1.3)
GFR SERPL CREATININE-BSD FRML MDRD: 96 ML/MIN/1.73SQ M
GLUCOSE P FAST SERPL-MCNC: 95 MG/DL (ref 65–99)
HDLC SERPL-MCNC: 52 MG/DL (ref 40–60)
LDLC SERPL CALC-MCNC: 92 MG/DL (ref 0–100)
NONHDLC SERPL-MCNC: 118 MG/DL
POTASSIUM SERPL-SCNC: 3.8 MMOL/L (ref 3.5–5.3)
PROT SERPL-MCNC: 6.9 G/DL (ref 6.4–8.2)
RUBV IGG SERPL IA-ACNC: >175 IU/ML
SODIUM SERPL-SCNC: 140 MMOL/L (ref 136–145)
TRIGL SERPL-MCNC: 132 MG/DL

## 2019-08-01 PROCEDURE — 36415 COLL VENOUS BLD VENIPUNCTURE: CPT

## 2019-08-01 PROCEDURE — 86762 RUBELLA ANTIBODY: CPT

## 2019-08-01 PROCEDURE — 80061 LIPID PANEL: CPT

## 2019-08-01 PROCEDURE — 86787 VARICELLA-ZOSTER ANTIBODY: CPT

## 2019-08-01 PROCEDURE — 86735 MUMPS ANTIBODY: CPT

## 2019-08-01 PROCEDURE — 86480 TB TEST CELL IMMUN MEASURE: CPT

## 2019-08-01 PROCEDURE — 86765 RUBEOLA ANTIBODY: CPT

## 2019-08-01 PROCEDURE — 80053 COMPREHEN METABOLIC PANEL: CPT

## 2019-08-02 LAB
GAMMA INTERFERON BACKGROUND BLD IA-ACNC: 0.02 IU/ML
M TB IFN-G BLD-IMP: NEGATIVE
M TB IFN-G CD4+ BCKGRND COR BLD-ACNC: 0 IU/ML
M TB IFN-G CD4+ BCKGRND COR BLD-ACNC: 0.01 IU/ML
MITOGEN IGNF BCKGRD COR BLD-ACNC: 1.13 IU/ML

## 2019-08-05 LAB
MEV IGG SER QL: NORMAL
MUV IGG SER QL: NORMAL
VZV IGG SER IA-ACNC: NORMAL

## 2019-08-15 ENCOUNTER — OFFICE VISIT (OUTPATIENT)
Dept: INTERNAL MEDICINE CLINIC | Facility: CLINIC | Age: 59
End: 2019-08-15
Payer: COMMERCIAL

## 2019-08-15 VITALS
RESPIRATION RATE: 16 BRPM | DIASTOLIC BLOOD PRESSURE: 74 MMHG | TEMPERATURE: 98 F | BODY MASS INDEX: 35.44 KG/M2 | SYSTOLIC BLOOD PRESSURE: 128 MMHG | OXYGEN SATURATION: 97 % | HEIGHT: 63 IN | WEIGHT: 200 LBS | HEART RATE: 75 BPM

## 2019-08-15 DIAGNOSIS — Z13.6 SCREENING FOR CARDIOVASCULAR CONDITION: ICD-10-CM

## 2019-08-15 DIAGNOSIS — M19.041 PRIMARY OSTEOARTHRITIS OF BOTH HANDS: ICD-10-CM

## 2019-08-15 DIAGNOSIS — Z13.0 SCREENING FOR DEFICIENCY ANEMIA: ICD-10-CM

## 2019-08-15 DIAGNOSIS — Z00.00 EXAMINATION, GENERAL MEDICAL: Primary | ICD-10-CM

## 2019-08-15 DIAGNOSIS — M19.042 PRIMARY OSTEOARTHRITIS OF BOTH HANDS: ICD-10-CM

## 2019-08-15 DIAGNOSIS — M67.40 GANGLION CYST: ICD-10-CM

## 2019-08-15 DIAGNOSIS — Z12.11 COLON CANCER SCREENING: ICD-10-CM

## 2019-08-15 DIAGNOSIS — E66.9 OBESITY (BMI 30-39.9): ICD-10-CM

## 2019-08-15 DIAGNOSIS — N30.00 ACUTE CYSTITIS WITHOUT HEMATURIA: ICD-10-CM

## 2019-08-15 PROCEDURE — 99396 PREV VISIT EST AGE 40-64: CPT | Performed by: INTERNAL MEDICINE

## 2019-08-15 PROCEDURE — 93000 ELECTROCARDIOGRAM COMPLETE: CPT | Performed by: INTERNAL MEDICINE

## 2019-08-15 NOTE — ASSESSMENT & PLAN NOTE
Ganglionic cyst noted on MRI report of right hand  Follow-up as needed with Orthopedics at [facility]

## 2019-08-15 NOTE — ASSESSMENT & PLAN NOTE
Most recent x-rays of the hands were reviewed along with MRI scan report  Recommend conservative treatment at the present time discomfort is not significant enough to warrant the initiation of nonsteroidal anti-inflammatories

## 2019-08-15 NOTE — PROGRESS NOTES
Assessment/Plan:    Primary osteoarthritis of both hands  Most recent x-rays of the hands were reviewed along with MRI scan report  Recommend conservative treatment at the present time discomfort is not significant enough to warrant the initiation of nonsteroidal anti-inflammatories  Obesity (BMI 30-39  9)  Body mass index 35 43 recommend decreasing calorie consumption by 25% and regular walking exercise  Ganglion cyst  Ganglionic cyst noted on MRI report of right hand  Follow-up as needed with Orthopedics at [facility]  Diagnoses and all orders for this visit:    Colon cancer screening  -     Occult Blood, Fecal Immunochemical; Future    Acute cystitis without hematuria  -     UA w Reflex to Microscopic w Reflex to Culture -Lab Collect    Screening for deficiency anemia  -     CBC and differential; Future    Screening for cardiovascular condition  -     POCT ECG    Primary osteoarthritis of both hands    Obesity (BMI 30-39  9)    Ganglion cyst        Subjective:      Patient ID: Jorge Mendieta is a 59 y o  female  This is an annual examination review of blood work and screening electrocardiogram for this 58-year-old female patient  She presents today with symptoms of arthritis in her hands  We reviewed her most recent x-rays and MRI of the hand performed at [facility]   [facility] Bassem [facility] for further evaluation of her symptoms  At the present time she does not find the symptoms be severe enough to warrant any medication  She has been doing well physically with no symptoms of any cardiac nature such as chest pain palpitations or shortness of breath  Her most recent DEXA scan was reviewed and indicates low risk for fractures at this time  She has been requested to perform a stool test for colon cancer screening    She is current on mammography for screening for breast cancer    Over the past year she has undergone a laparoscopic hysterectomy and bilateral salpingo oophorectomy  The following portions of the patient's history were reviewed and updated as appropriate:   She  has a past medical history of Left knee injury, Menometrorrhagia, Metrorrhagia, Mitral valve prolapse syndrome, Osteopenia, Rosacea, and Thyroid nodule  She   Patient Active Problem List    Diagnosis Date Noted    Ganglion cyst 2019    Hypertriglyceridemia 2018    Glucose intolerance 2018    Obesity (BMI 30-39 9) 2018    Primary osteoarthritis of both hands 2018    Examination, general medical 2018     She  has a past surgical history that includes  section, low transverse; Colonoscopy; Fracture surgery (Right); pr hysteroscopy,w/endo bx (N/A, 10/18/2016); Polypectomy (N/A, 10/18/2016); pr laparoscopy w tot hysterectuterus <=250 gram  w tube/ovary (N/A, 2018); CYSTOSCOPY (N/A, 2018); Dilation and curettage of uterus; and Hysterectomy  Her family history includes Diabetes in her father and mother; Heart disease in her father and mother; Hypertension in her family; No Known Problems in her brother, brother, brother, and sister; Stroke in her mother; Thyroid cancer in her sister; Thyroid disease in her mother; Uterine cancer in her sister  She  reports that she quit smoking about 19 years ago  She has never used smokeless tobacco  She reports that she drinks alcohol  She reports that she does not use drugs  Current Outpatient Medications   Medication Sig Dispense Refill    Azelaic Acid (FINACEA) 15 % cream Apply 1 application topically daily      calcium polycarbophil (FIBER-CAPS) 625 mg tablet Take by mouth      econazole nitrate 1 % cream        No current facility-administered medications for this visit       Review of Systems   Musculoskeletal: Positive for arthralgias and joint swelling  All other systems reviewed and are negative          Objective:      /74   Pulse 75   Temp 98 °F (36 7 °C)   Resp 16  5' 3" (1 6 m)   Wt 90 7 kg (200 lb)   LMP 10/03/2016 (Approximate)   SpO2 97%   BMI 35 43 kg/m²          Physical Exam   Constitutional: She is oriented to person, place, and time  Vital signs are normal  She appears well-developed and well-nourished  She is cooperative  No distress  HENT:   Right Ear: Hearing, tympanic membrane, external ear and ear canal normal    Left Ear: Hearing, tympanic membrane, external ear and ear canal normal    Nose: Nose normal  No mucosal edema  Mouth/Throat: Uvula is midline, oropharynx is clear and moist and mucous membranes are normal  No oropharyngeal exudate  Eyes: Pupils are equal, round, and reactive to light  Conjunctivae and lids are normal  Right eye exhibits no discharge  Left eye exhibits no discharge  No scleral icterus  Neck: No JVD present  Carotid bruit is not present  No thyromegaly present  Cardiovascular: Normal rate, regular rhythm, normal heart sounds and intact distal pulses  No murmur heard  Pulmonary/Chest: Effort normal and breath sounds normal  No stridor  No respiratory distress  She has no wheezes  She has no rales  Abdominal: Soft  Normal appearance and bowel sounds are normal  She exhibits no distension and no mass  There is no tenderness  There is no guarding  Musculoskeletal: Normal range of motion  She exhibits tenderness and deformity  She exhibits no edema  Arthritic changes of the right thumb   Lymphadenopathy:     She has no cervical adenopathy  Neurological: She is alert and oriented to person, place, and time  She has normal reflexes  She displays normal reflexes  No cranial nerve deficit  Coordination normal    Skin: Skin is warm, dry and intact  No rash noted  She is not diaphoretic  No erythema  No pallor  Psychiatric: She has a normal mood and affect  Her speech is normal and behavior is normal  Judgment and thought content normal  Cognition and memory are normal    Vitals reviewed  BMI Counseling:  Body mass index is 35 43 kg/m²  Discussed the patient's BMI with her  The BMI is above average  BMI counseling and education was provided to the patient  Nutrition recommendations include reducing portion sizes, moderation in carbohydrate intake, increasing intake of lean protein, reducing intake of saturated fat and trans fat and reducing intake of cholesterol  Exercise recommendations include exercising 3-5 times per week

## 2019-08-17 ENCOUNTER — APPOINTMENT (OUTPATIENT)
Dept: LAB | Age: 59
End: 2019-08-17
Payer: COMMERCIAL

## 2019-08-17 DIAGNOSIS — Z12.11 COLON CANCER SCREENING: ICD-10-CM

## 2019-08-17 DIAGNOSIS — Z13.0 SCREENING FOR DEFICIENCY ANEMIA: ICD-10-CM

## 2019-08-17 LAB
BASOPHILS # BLD AUTO: 0.05 THOUSANDS/ΜL (ref 0–0.1)
BASOPHILS NFR BLD AUTO: 1 % (ref 0–1)
BILIRUB UR QL STRIP: NEGATIVE
CLARITY UR: CLEAR
COLOR UR: YELLOW
EOSINOPHIL # BLD AUTO: 0.14 THOUSAND/ΜL (ref 0–0.61)
EOSINOPHIL NFR BLD AUTO: 2 % (ref 0–6)
ERYTHROCYTE [DISTWIDTH] IN BLOOD BY AUTOMATED COUNT: 13.5 % (ref 11.6–15.1)
GLUCOSE UR STRIP-MCNC: NEGATIVE MG/DL
HCT VFR BLD AUTO: 39.5 % (ref 34.8–46.1)
HEMOCCULT STL QL IA: NEGATIVE
HGB BLD-MCNC: 13 G/DL (ref 11.5–15.4)
HGB UR QL STRIP.AUTO: NEGATIVE
IMM GRANULOCYTES # BLD AUTO: 0.02 THOUSAND/UL (ref 0–0.2)
IMM GRANULOCYTES NFR BLD AUTO: 0 % (ref 0–2)
KETONES UR STRIP-MCNC: NEGATIVE MG/DL
LEUKOCYTE ESTERASE UR QL STRIP: NEGATIVE
LYMPHOCYTES # BLD AUTO: 1.77 THOUSANDS/ΜL (ref 0.6–4.47)
LYMPHOCYTES NFR BLD AUTO: 27 % (ref 14–44)
MCH RBC QN AUTO: 30.4 PG (ref 26.8–34.3)
MCHC RBC AUTO-ENTMCNC: 32.9 G/DL (ref 31.4–37.4)
MCV RBC AUTO: 92 FL (ref 82–98)
MONOCYTES # BLD AUTO: 0.46 THOUSAND/ΜL (ref 0.17–1.22)
MONOCYTES NFR BLD AUTO: 7 % (ref 4–12)
NEUTROPHILS # BLD AUTO: 4.03 THOUSANDS/ΜL (ref 1.85–7.62)
NEUTS SEG NFR BLD AUTO: 63 % (ref 43–75)
NITRITE UR QL STRIP: NEGATIVE
NRBC BLD AUTO-RTO: 0 /100 WBCS
PH UR STRIP.AUTO: 6 [PH]
PLATELET # BLD AUTO: 307 THOUSANDS/UL (ref 149–390)
PMV BLD AUTO: 12.4 FL (ref 8.9–12.7)
PROT UR STRIP-MCNC: NEGATIVE MG/DL
RBC # BLD AUTO: 4.28 MILLION/UL (ref 3.81–5.12)
SP GR UR STRIP.AUTO: 1.02 (ref 1–1.03)
UROBILINOGEN UR QL STRIP.AUTO: 0.2 E.U./DL
WBC # BLD AUTO: 6.47 THOUSAND/UL (ref 4.31–10.16)

## 2019-08-17 PROCEDURE — 36415 COLL VENOUS BLD VENIPUNCTURE: CPT

## 2019-08-17 PROCEDURE — 81003 URINALYSIS AUTO W/O SCOPE: CPT | Performed by: INTERNAL MEDICINE

## 2019-08-17 PROCEDURE — 85025 COMPLETE CBC W/AUTO DIFF WBC: CPT

## 2019-08-17 PROCEDURE — G0328 FECAL BLOOD SCRN IMMUNOASSAY: HCPCS

## 2019-09-06 ENCOUNTER — CONSULT (OUTPATIENT)
Dept: SURGERY | Facility: CLINIC | Age: 59
End: 2019-09-06
Payer: COMMERCIAL

## 2019-09-06 VITALS — SYSTOLIC BLOOD PRESSURE: 122 MMHG | TEMPERATURE: 98.2 F | DIASTOLIC BLOOD PRESSURE: 78 MMHG | HEART RATE: 83 BPM

## 2019-09-06 DIAGNOSIS — L02.411 ABSCESS OF RIGHT AXILLA: Primary | ICD-10-CM

## 2019-09-06 PROCEDURE — 10060 I&D ABSCESS SIMPLE/SINGLE: CPT | Performed by: SURGERY

## 2019-09-06 NOTE — PROGRESS NOTES
Office Visit - General Surgery  Nevaeh Harrison MRN: [id number]  Encounter: [id number]    Assessment and Plan    Problem List Items Addressed This Visit        Other    Abscess of right axilla - Primary     Abscess in the right axilla was drained without difficulty  I told her mid looks like it may have been a sebaceous cyst   She is asked to remove the packing tomorrow keep the area clean and covered as long as it is draining  Would see back in 6-8 weeks if there is a resemblance of a nodule reoccurring  Chief Complaint:  Nevaeh Harrison is a 59 y o  female who presents for Mass (abscess right axilla)    Subjective  49-year-old female who had noted a small nodule in the right axilla  Over Labor Day holiday got bigger and somewhat more red and more tender      Past Medical History  Past Medical History:   Diagnosis Date    Left knee injury     grade 1 injury of medial collateral ligament of left knee, sprain  of MCL joint    Menometrorrhagia     Metrorrhagia     Mitral valve prolapse syndrome     leaflet syndrome    Osteopenia     Rosacea     Thyroid nodule     Nontoxic single       Past Surgical History  Past Surgical History:   Procedure Laterality Date     SECTION, LOW TRANSVERSE      COLONOSCOPY      CYSTOSCOPY N/A 2018    Procedure: CYSTOSCOPY;  Surgeon: Gm Ham MD;  [facility];  Service: Gynecology Oncology    DILATION AND CURETTAGE OF UTERUS      FRACTURE SURGERY Right     right wrist     HYSTERECTOMY      POLYPECTOMY N/A 10/18/2016    Procedure: POLYPECTOMY;  Surgeon: Helder Henriquez DO;  [facility];  Service:     HI HYSTEROSCOPY,W/ENDO BX N/A 10/18/2016    Procedure: DILATATION AND CURETTAGE (D&C) WITH HYSTEROSCOPY;  Surgeon: Helder Henriquez DO;  [facility];  Service: Gynecology    HI LAPAROSCOPY W TOT HYSTERECTUTERUS <=250 GRAM  W TUBE/OVARY N/A 2018    Procedure: ROBOTIC HYSTERECTOMY, BSO ;  Surgeon: Roseann MD Diamond;  [facility];  Service: Gynecology Oncology       Family History  Family History   Problem Relation Age of Onset    Diabetes Mother     Heart disease Mother    [facility] Stroke Mother     Thyroid disease Mother     Diabetes Father     Heart disease Father     Hypertension Family     Uterine cancer Sister     Thyroid cancer Sister     No Known Problems Brother     No Known Problems Brother     No Known Problems Brother     No Known Problems Sister        Social History  Social History     Socioeconomic History    Marital status: /Civil Union     Spouse name: None    Number of children: None    Years of education: None    Highest education level: None   Occupational History    None   Social Needs    Financial resource strain: None    Food insecurity:     Worry: None     Inability: None    Transportation needs:     Medical: None     Non-medical: None   Tobacco Use    Smoking status: Former Smoker     Last attempt to quit: 2000     Years since quittin 6    Smokeless tobacco: Never Used    Tobacco comment: Quit   Substance and Sexual Activity    Alcohol use: Yes     Comment: socially    Drug use: No    Sexual activity: Yes     Partners: Male     Birth control/protection: Post-menopausal, Surgical     Comment:    Lifestyle    Physical activity:     Days per week: None     Minutes per session: None    Stress: None   Relationships    Social connections:     Talks on phone: None     Gets together: None     Attends Latter-day service: None     Active member of club or organization: None     Attends meetings of clubs or organizations: None     Relationship status: None    Intimate partner violence:     Fear of current or ex partner: None     Emotionally abused: None     Physically abused: None     Forced sexual activity: None   Other Topics Concern    None   Social History Narrative    Works full time        Medications  Current Outpatient Medications on [employer] Prior to Visit   Medication Sig Dispense Refill    Azelaic Acid (FINACEA) 15 % cream Apply 1 application topically daily      calcium polycarbophil (FIBER-CAPS) 625 mg tablet Take by mouth      econazole nitrate 1 % cream        No current facility-administered medications on file prior to visit  Allergies  Allergies   Allergen Reactions    Ceclor [Cefaclor] Rash    Ciprofloxacin Hcl Rash    Erythromycin Rash     Powder/Suspension    Flagyl [Metronidazole] Rash    Penicillins Rash    Bactrim [Sulfamethoxazole-Trimethoprim] Rash       Review of Systems    Objective  Vitals:    09/06/19 1132   BP: 122/78   Pulse: 83   Temp: 98 2 °F (36 8 °C)       Physical Exam   Right axilla is a 1 5 to 2 cm red raised fluctuant area  Incision and Drainage  Date/Time: 9/6/2019 11:59 AM  Performed by: Isa Pearson MD  Authorized by: Isa Pearson MD        After permission, right axilla was prepped and draped in the usual fashion  Local anesthesia of 1% lidocaine with epinephrine was infiltrated into the red inflamed area  An 11 blade knife was used incision made and a large amount of purulent material was removed  This was irrigated out  A piece of 1 quarter-inch plain packing was placed  Dressing applied  Tolerated procedure well

## 2019-09-06 NOTE — ASSESSMENT & PLAN NOTE
Abscess in the right axilla was drained without difficulty  I told her mid looks like it may have been a sebaceous cyst   She is asked to remove the packing tomorrow keep the area clean and covered as long as it is draining  Would see back in 6-8 weeks if there is a resemblance of a nodule reoccurring

## 2019-10-22 ENCOUNTER — APPOINTMENT (OUTPATIENT)
Dept: LAB | Age: 59
End: 2019-10-22
Payer: COMMERCIAL

## 2019-10-22 ENCOUNTER — TRANSCRIBE ORDERS (OUTPATIENT)
Dept: ADMINISTRATIVE | Age: 59
End: 2019-10-22

## 2019-10-22 DIAGNOSIS — G47.9 REPETITIVE INTRUSIONS OF SLEEP: ICD-10-CM

## 2019-10-22 DIAGNOSIS — R68.82 DECREASED LIBIDO: ICD-10-CM

## 2019-10-22 DIAGNOSIS — N95.1 SYMPTOMATIC MENOPAUSAL OR FEMALE CLIMACTERIC STATES: Primary | ICD-10-CM

## 2019-10-22 DIAGNOSIS — L71.9 ROSACEA: ICD-10-CM

## 2019-10-22 DIAGNOSIS — N95.1 SYMPTOMATIC MENOPAUSAL OR FEMALE CLIMACTERIC STATES: ICD-10-CM

## 2019-10-22 LAB
25(OH)D3 SERPL-MCNC: 20.3 NG/ML (ref 30–100)
ANION GAP SERPL CALCULATED.3IONS-SCNC: 9 MMOL/L (ref 4–13)
BUN SERPL-MCNC: 18 MG/DL (ref 5–25)
CALCIUM SERPL-MCNC: 9 MG/DL (ref 8.3–10.1)
CHLORIDE SERPL-SCNC: 111 MMOL/L (ref 100–108)
CO2 SERPL-SCNC: 22 MMOL/L (ref 21–32)
CREAT SERPL-MCNC: 0.82 MG/DL (ref 0.6–1.3)
GFR SERPL CREATININE-BSD FRML MDRD: 78 ML/MIN/1.73SQ M
GLUCOSE P FAST SERPL-MCNC: 99 MG/DL (ref 65–99)
INSULIN SERPL-ACNC: 12.6 MU/L (ref 3–25)
POTASSIUM SERPL-SCNC: 4 MMOL/L (ref 3.5–5.3)
SODIUM SERPL-SCNC: 142 MMOL/L (ref 136–145)
T3FREE SERPL-MCNC: 2.73 PG/ML (ref 2.3–4.2)
T4 FREE SERPL-MCNC: 0.86 NG/DL (ref 0.76–1.46)
T4 SERPL-MCNC: 7.9 UG/DL (ref 4.7–13.3)
TSH SERPL DL<=0.05 MIU/L-ACNC: 2.33 UIU/ML (ref 0.36–3.74)

## 2019-10-22 PROCEDURE — 84439 ASSAY OF FREE THYROXINE: CPT

## 2019-10-22 PROCEDURE — 80048 BASIC METABOLIC PNL TOTAL CA: CPT

## 2019-10-22 PROCEDURE — 84481 FREE ASSAY (FT-3): CPT

## 2019-10-22 PROCEDURE — 83525 ASSAY OF INSULIN: CPT

## 2019-10-22 PROCEDURE — 36415 COLL VENOUS BLD VENIPUNCTURE: CPT

## 2019-10-22 PROCEDURE — 84443 ASSAY THYROID STIM HORMONE: CPT

## 2019-10-22 PROCEDURE — 82306 VITAMIN D 25 HYDROXY: CPT

## 2019-10-22 PROCEDURE — 84436 ASSAY OF TOTAL THYROXINE: CPT

## 2020-01-15 PROBLEM — L50.0 ALLERGIC URTICARIA: Status: ACTIVE | Noted: 2020-01-15

## 2020-01-20 ENCOUNTER — TELEPHONE (OUTPATIENT)
Dept: OBGYN CLINIC | Facility: CLINIC | Age: 60
End: 2020-01-20

## 2020-01-20 ENCOUNTER — TRANSCRIBE ORDERS (OUTPATIENT)
Dept: ADMINISTRATIVE | Facility: HOSPITAL | Age: 60
End: 2020-01-20

## 2020-01-20 DIAGNOSIS — Z12.31 SCREENING MAMMOGRAM FOR HIGH-RISK PATIENT: Primary | ICD-10-CM

## 2020-01-20 DIAGNOSIS — Z12.31 VISIT FOR SCREENING MAMMOGRAM: Primary | ICD-10-CM

## 2020-01-26 PROBLEM — Z88.0 PENICILLIN ALLERGY: Status: ACTIVE | Noted: 2020-01-26

## 2020-02-19 ENCOUNTER — HOSPITAL ENCOUNTER (OUTPATIENT)
Dept: RADIOLOGY | Facility: HOSPITAL | Age: 60
Discharge: HOME | End: 2020-02-19
Attending: OBSTETRICS & GYNECOLOGY
Payer: COMMERCIAL

## 2020-02-19 ENCOUNTER — TRANSCRIBE ORDERS (OUTPATIENT)
Dept: ADMISSIONS | Facility: HOSPITAL | Age: 60
End: 2020-02-19

## 2020-02-19 VITALS — BODY MASS INDEX: 34.83 KG/M2 | WEIGHT: 204 LBS | HEIGHT: 64 IN

## 2020-02-19 DIAGNOSIS — Z12.31 SCREENING MAMMOGRAM FOR HIGH-RISK PATIENT: ICD-10-CM

## 2020-02-19 PROCEDURE — 77067 SCR MAMMO BI INCL CAD: CPT

## 2020-04-04 DIAGNOSIS — L71.9 ROSACEA: Primary | ICD-10-CM

## 2020-04-06 RX ORDER — AZELAIC ACID 0.15 G/G
GEL TOPICAL
Qty: 50 G | Refills: 3 | Status: SHIPPED | OUTPATIENT
Start: 2020-04-06 | End: 2022-02-11

## 2020-07-07 ENCOUNTER — ANNUAL EXAM (OUTPATIENT)
Dept: OBGYN CLINIC | Facility: CLINIC | Age: 60
End: 2020-07-07
Payer: COMMERCIAL

## 2020-07-07 VITALS
HEIGHT: 64 IN | BODY MASS INDEX: 35 KG/M2 | DIASTOLIC BLOOD PRESSURE: 74 MMHG | WEIGHT: 205 LBS | SYSTOLIC BLOOD PRESSURE: 124 MMHG

## 2020-07-07 DIAGNOSIS — L90.0 LICHEN SCLEROSUS ET ATROPHICUS: ICD-10-CM

## 2020-07-07 DIAGNOSIS — Z12.31 ENCOUNTER FOR SCREENING MAMMOGRAM FOR MALIGNANT NEOPLASM OF BREAST: ICD-10-CM

## 2020-07-07 DIAGNOSIS — E89.41 SYMPTOMATIC POSTSURGICAL MENOPAUSE: ICD-10-CM

## 2020-07-07 DIAGNOSIS — Z01.419 ENCOUNTER FOR GYNECOLOGICAL EXAMINATION (GENERAL) (ROUTINE) WITHOUT ABNORMAL FINDINGS: Primary | ICD-10-CM

## 2020-07-07 PROCEDURE — 99396 PREV VISIT EST AGE 40-64: CPT | Performed by: OBSTETRICS & GYNECOLOGY

## 2020-07-07 RX ORDER — CLOBETASOL PROPIONATE 0.5 MG/G
OINTMENT TOPICAL 2 TIMES DAILY
Qty: 30 G | Refills: 5 | Status: SHIPPED | OUTPATIENT
Start: 2020-07-07 | End: 2020-07-23 | Stop reason: ALTCHOICE

## 2020-07-07 RX ORDER — ESTRADIOL 0.05 MG/D
1 PATCH TRANSDERMAL WEEKLY
Qty: 12 PATCH | Refills: 3 | Status: SHIPPED | OUTPATIENT
Start: 2020-07-07 | End: 2021-01-21 | Stop reason: SINTOL

## 2020-07-07 NOTE — PROGRESS NOTES
Chinmarii Faviola   1960    CC:  Yearly exam    S:  62 y o  female here for yearly exam  She is postmenopausal and has had no vaginal bleeding  She does continue to have hot flashes which are bothersome to her  She tried to take bioidentical hormones prescribed by Dr Murray Akers but had an allergic reaction to it (systemic hives)  She stopped the medication with resolution of those symptoms  She tried to restart it with the same result  She is still hopeful that her symptoms could be better so was wondering about traditional HRT  We reviewed the indication for use of systemic HRT as for the control of symptomatic menopause  We discussed that therapy should be individualized to each patient as each woman may find a different level of hot flashes to be acceptable and quality of life is difficult to define  We discussed that there are FDA approved bioidentical versions of HRT which are estradiol and prometrium  We discussed the [facility]I data in terms of women who are recently menopausal (such as herself) having a much lower risk of use than someone who is further into the menopause change  We reviewed the potentially small increased risk of heart attack and stroke that was seen in the older population of women which was not seen in the recently menopausal patients  We reviewed the potential benefits of reduction in colon cancer and Alzheimer's and the reduced risk of osteoporosis  We discussed transdermal estrogen as reducing the VTE risk that can be associated with HRT  We finally discussed the small increased risk of breast cancer associated with progesterone use but minimal as compared with the overall risk of breast cancer in women as they age  All questions were answered, and patient wishes to try HRT  She denies vaginal discharge, itching or odor  She has noticed some vaginal dryness which should respond to HRT    She recently washed her perineal skin without a washcloth and thought it felt a little bit rough  She feels that this has been present for about a month  She would not have known that it was irritated if she had not felt it  Sexual activity: She is sexually active with some dryness  Last Pap: 2017 - negative (endometrial cells); s/p hysterectomy  Last Mammo: 2020 - BIRAD-1  Last Colonoscopy: 5 years ago  Last DEXA: 2018 - normal    We reviewed ASCCP guidelines for Pap testing  Family hx of breast cancer:   Family hx of ovarian cancer:   Family hx of colon cancer:       Current Outpatient Medications:     Azelaic Acid 15 % cream, APPLY TO ROSACEA ONCE EVERY DAY, Disp: 50 g, Rfl: 3    calcium polycarbophil (FIBER-CAPS) 625 mg tablet, Take by mouth, Disp: , Rfl:     econazole nitrate 1 % cream, , Disp: , Rfl:     ergocalciferol (VITAMIN D2) 50,000 units, Take 50,000 Units by mouth, Disp: , Rfl:     clobetasol (TEMOVATE) 0 05 % ointment, Apply topically 2 (two) times a day for 14 days Use as needed to control symptoms, Disp: 30 g, Rfl: 5    estradiol (CLIMARA) 0 05 mg/24 hr, Place 1 patch on the skin once a week, Disp: 12 patch, Rfl: 3  Social History     Socioeconomic History    Marital status: /Civil Union     Spouse name: Shalom Camarena Number of children: 1    Years of education: Not on file    Highest education level: Not on file   Occupational History    Not on file   Social Needs    Financial resource strain: Not on file    Food insecurity:     Worry: Not on file     Inability: Not on file   MashON needs:     Medical: Not on file     Non-medical: Not on file   Tobacco Use    Smoking status: Former Smoker     Packs/day: 0 50     Years: 20 00     Pack years: 10 00     Last attempt to quit: 2000     Years since quittin 5    Smokeless tobacco: Never Used    Tobacco comment: Quit   Substance and Sexual Activity    Alcohol use:  Yes     Alcohol/week: 1 0 standard drinks     Types: 1 Glasses of wine per week     Frequency: 2-3 times a week Drinks per session: 1 or 2     Binge frequency: Never    Drug use: Never    Sexual activity: Yes     Partners: Male     Birth control/protection: Post-menopausal, Surgical     Comment:    Lifestyle    Physical activity:     Days per week: 2 days     Minutes per session: 30 min    Stress: Not on file   Relationships    Social connections:     Talks on phone: Not on file     Gets together: Not on file     Attends Yazidism service: Not on file     Active member of club or organization: Not on file     Attends meetings of clubs or organizations: Not on file     Relationship status: Not on file    Intimate partner violence:     Fear of current or ex partner: Not on file     Emotionally abused: Not on file     Physically abused: Not on file     Forced sexual activity: Not on file   Other Topics Concern    Not on file   Social History Narrative    Who lives in your home:  and self    What type of home do you live in: Single    Age of your home: 36 yrs    How long have you been living there: 15 yrs    Type of heat: Forced hot air    Central AC    Type of fuel: oil    What type of radha is in your bedroom:Carpeting    Do you have the following in or near your home:    Air products: No purifiers or humdifiers    Pests: No    Pets: Dog (sweata)    Are pets allowed in bedroom: Yes    Open fields, wooded areas nearby: Wooded area    Basement: Partial finished, dry, dehumidifier, crawl space    Exposure to second hand smoke: No        Habits:    Caffeine: Coffee; Amount: 2/day, 40 years;  Soda occasionally; hot tea/iced tea occasionally    Chocolate: occasionally            Works full time     Family History   Problem Relation Age of Onset    Diabetes Mother     Heart disease Mother     Stroke Mother     Thyroid disease Mother     Diabetes Father     Heart disease Father     Hypertension Family     Uterine cancer Sister     Thyroid cancer Sister     Endometrial cancer Sister 36    Prostate cancer Brother 58    No Known Problems Brother     Allergies Sister     No Known Problems Brother     Lionel syndrome Sister     Neurodegenerative disease Sister     Ovarian cancer Paternal Aunt 79     Past Medical History:   Diagnosis Date    Left knee injury     grade 1 injury of medial collateral ligament of left knee, sprain  of MCL joint    Menometrorrhagia     Metrorrhagia     Mitral valve prolapse syndrome     leaflet syndrome    Osteopenia     Rosacea     Thyroid nodule     Nontoxic single        Review of Systems   Respiratory: Negative  Cardiovascular: Negative  Gastrointestinal: Negative for constipation and diarrhea  Genitourinary: Negative for difficulty urinating, pelvic pain, vaginal bleeding, vaginal discharge, itching or odor  O:  Blood pressure 124/74, height 5' 3 7" (1 618 m), weight 93 kg (205 lb), last menstrual period 10/03/2016  Patient appears well and is not in distress  Neck is supple without masses  Breasts are symmetrical without mass, tenderness, nipple discharge, skin changes or adenopathy  Abdomen is soft and nontender without masses  External genitals are normal  +areas of hypopigmentation bilaterally as well as lichenification on the perineum and perirectal; consistent with lichen sclerosus  Urethral meatus and urethra are normal  Bladder is normal to palpation  Vagina is normal without discharge or bleeding  Cervix is surgically absent  Uterus is surgically absent  Adnexa are surgically absent  No masses  Patient counseled on vulvar changes consistent with lichen sclerosus  Discussed clobetasol x 2 weeks and then prn for irritation  Discussed potential benefit of estrogen for this  Discussed small risk of vulvar cancer in setting of LS and therefore need for biopsy if symptoms do not resolve with two weeks of use        A:  Yearly exam      P:   Pap no longer indicated  Mammo slip given   Colonoscopy due 5 years   DEXA in 15 years unless new risk factors develop   Clobetasol ointment prescribed   Climara patch prescribed (patient contacted via e-mail to set up phone call to discuss why progesterone not necessary though initially discussed)    Virtual visit in six months to discuss HRT  RTO one year for yearly exam or sooner as needed

## 2020-07-17 ENCOUNTER — TELEPHONE (OUTPATIENT)
Dept: INTERNAL MEDICINE CLINIC | Facility: CLINIC | Age: 60
End: 2020-07-17

## 2020-07-17 DIAGNOSIS — Z12.11 COLON CANCER SCREENING: ICD-10-CM

## 2020-07-17 DIAGNOSIS — R39.9 UTI SYMPTOMS: ICD-10-CM

## 2020-07-17 DIAGNOSIS — E78.1 HYPERTRIGLYCERIDEMIA: ICD-10-CM

## 2020-07-17 DIAGNOSIS — E55.9 VITAMIN D DEFICIENCY: ICD-10-CM

## 2020-07-17 DIAGNOSIS — Z13.0 SCREENING FOR DEFICIENCY ANEMIA: Primary | ICD-10-CM

## 2020-07-17 DIAGNOSIS — Z13.1 SCREENING FOR DIABETES MELLITUS: ICD-10-CM

## 2020-07-17 NOTE — TELEPHONE ENCOUNTER
Patient rescheduled her appoint to 8/26  Can you place an order for bloodowrk  She will go to the lab

## 2020-07-17 NOTE — TELEPHONE ENCOUNTER
Orders have been placed in the [facility] system for the patient's annual blood work thank you

## 2020-07-23 ENCOUNTER — OFFICE VISIT (OUTPATIENT)
Dept: DERMATOLOGY | Facility: CLINIC | Age: 60
End: 2020-07-23
Payer: COMMERCIAL

## 2020-07-23 ENCOUNTER — TELEPHONE (OUTPATIENT)
Dept: OBGYN CLINIC | Facility: CLINIC | Age: 60
End: 2020-07-23

## 2020-07-23 VITALS — HEIGHT: 63 IN | BODY MASS INDEX: 35.61 KG/M2 | TEMPERATURE: 96.8 F | WEIGHT: 201 LBS

## 2020-07-23 DIAGNOSIS — L90.0 LICHEN SCLEROSUS ET ATROPHICUS: ICD-10-CM

## 2020-07-23 DIAGNOSIS — L71.9 ROSACEA: Primary | ICD-10-CM

## 2020-07-23 DIAGNOSIS — R21 RASH: ICD-10-CM

## 2020-07-23 DIAGNOSIS — D23.9 DERMATOFIBROMA: ICD-10-CM

## 2020-07-23 PROCEDURE — 99214 OFFICE O/P EST MOD 30 MIN: CPT | Performed by: DERMATOLOGY

## 2020-07-23 PROCEDURE — 88305 TISSUE EXAM BY PATHOLOGIST: CPT | Performed by: STUDENT IN AN ORGANIZED HEALTH CARE EDUCATION/TRAINING PROGRAM

## 2020-07-23 PROCEDURE — 88313 SPECIAL STAINS GROUP 2: CPT | Performed by: STUDENT IN AN ORGANIZED HEALTH CARE EDUCATION/TRAINING PROGRAM

## 2020-07-23 PROCEDURE — 11104 PUNCH BX SKIN SINGLE LESION: CPT | Performed by: DERMATOLOGY

## 2020-07-23 RX ORDER — CLOBETASOL PROPIONATE 0.5 MG/G
OINTMENT TOPICAL 2 TIMES DAILY
Qty: 60 G | Refills: 2 | Status: SHIPPED | OUTPATIENT
Start: 2020-07-23 | End: 2020-10-05 | Stop reason: SDUPTHER

## 2020-07-23 NOTE — PROGRESS NOTES
[facility] Note     Patient Name: Franc Barbosa  Encounter Date: 07/23/2020     Have you been cared for by a [facility] in the last 3 years and, if so, which one? YES, Dr Cate Guy    · Have you traveled outside of the [address] [place] [address] in the past 3 months or outside of the [facility] area in the last 2 weeks? No     May we call your Preferred Phone number to discuss your specific medical information? Yes     May we leave a detailed message that includes your specific medical information? Yes      Today's Chief Concerns:   Concern #1:  Skin exam   Concern #2:  Follow up rosaces   Concern #3:  Lesions on chest    Past Medical History:  Have you personally ever had or currently have any of the following? · Skin cancer (such as Melanoma, Basal Cell Carcinoma, Squamous Cell Carcinoma? (If Yes, please provide more detail)- No  · Eczema: No  · Psoriasis: No  · HIV/AIDS: No  · Hepatitis B or C: No  · Tuberculosis: No  · Systemic Immunosuppression such as Diabetes, Biologic or Immunotherapy, Chemotherapy, Organ Transplantation, Bone Marrow Transplantation (If YES, please provide more detail): No  · Radiation Treatment (If YES, please provide more detail): No  · Any other major medical conditions/concerns? 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 Family History:  Have any of your "first degree relatives" (parent, brother, sister, or child) had any of the following       · Skin cancer such as Melanoma or Merkel Cell Carcinoma or Pancreatic Cancer? YES, Brother Hx of skin cancer  · Eczema, Asthma, Hay Fever or Seasonal Allergies: YES, Sister  · Psoriasis or Psoriatic Arthritis: No  · Do any other medical conditions seem to run in your family?   If Yes, what condition and which relatives? No    Current Medications:   (please update all dermatological medications before printing patient's AVS!)      Current Outpatient Medications:     Azelaic Acid 15 % cream, APPLY TO ROSACEA ONCE EVERY DAY, Disp: 50 g, Rfl: 3    calcium polycarbophil (FIBER-CAPS) 625 mg tablet, Take by mouth, Disp: , Rfl:     clobetasol (TEMOVATE) 0 05 % ointment, Apply topically 2 (two) times a day for 14 days Use as needed to control symptoms, Disp: 30 g, Rfl: 5    econazole nitrate 1 % cream, , Disp: , Rfl:     ergocalciferol (VITAMIN D2) 50,000 units, Take 5,000 Units by mouth daily , Disp: , Rfl:     estradiol (CLIMARA) 0 05 mg/24 hr, Place 1 patch on the skin once a week, Disp: 12 patch, Rfl: 3      Review of Systems:  Have you recently had or currently have any of the following? If YES, what are you doing for the problem? · Fever, chills or unintended weight loss: No  · Sudden loss or change in your vision: No  · Nausea, vomiting or blood in your stool: No  · Painful or swollen joints: No  · Wheezing or cough: No  · Changing mole or non-healing wound: YES, Braline lesion  · Nosebleeds: No  · Excessive sweating: No  · Easy or prolonged bleeding? No  · Over the last 2 weeks, how often have you been bothered by the following problems? · Taking little interest or pleasure in doing things: 1 - Not at All  · Feeling down, depressed, or hopeless: 1 - Not at All  · Rapid heartbeat with epinephrine:  No    · FEMALES ONLY:    · Are you pregnant or planning to become pregnant? No  · Are you currently or planning to be nursing or breast feeding? No    · Any known allergies?       Allergies   Allergen Reactions    Ceclor [Cefaclor] Rash    Ciprofloxacin Hcl Rash    Erythromycin Rash     Powder/Suspension    Flagyl [Metronidazole] Rash    Penicillins Rash    Bactrim [Sulfamethoxazole-Trimethoprim] Rash   ·       Physical Exam:     Was a chaperone (Derm Clinical Assistant) present throughout the entire Physical Exam? Yes     Did the Dermatology Team specifically  the patient on the importance of a Full Skin Exam to be sure that nothing is missed clinically?  Yes}  o Did the patient ultimately request or accept a Full Skin Exam?  Yes  o Did the patient specifically refuse to have the areas "under-the-bra" examined by the Dermatologist? No  o Did the patient specifically refuse to have the areas "under-the-underwear" examined by the Dermatologist? No    CONSTITUTIONAL:   Vitals:    07/23/20 0846   Temp: (!) 96 8 °F (36 °C)   TempSrc: Temporal   Weight: 91 2 kg (201 lb)   Height: 5' 3" (1 6 m)       PSYCH: Normal mood and affect  EYES: Normal conjunctiva  ENT: Normal lips and oral mucosa  CARDIOVASCULAR: No edema  RESPIRATORY: Normal respirations  HEME/LYMPH/IMMUNO:  No regional lymphadenopathy except as noted below in "ASSESSMENT AND PLAN BY DIAGNOSIS"    SKIN:  FULL ORGAN SYSTEM EXAM   Hair, Scalp, Ears, Face Normal except as noted below in Assessment   Neck, Cervical Chain Nodes Normal except as noted below in Assessment   Right Arm/Hand/Fingers Normal except as noted below in Assessment   Left Arm/Hand/Fingers Normal except as noted below in Assessment   Chest/Breasts/Axillae Viewed areas Normal except as noted below in Assessment   Abdomen, Umbilicus Normal except as noted below in Assessment   Back/Spine Normal except as noted below in Assessment   Groin/Genitalia/Buttocks Normal except as noted below in Assessment   Right Leg, Foot, Toes Normal except as noted below in Assessment   Left Leg, Foot, Toes Normal except as noted below in Assessment        Assessment and Plan by Diagnosis:    History of Present Condition:     Duration:  How long has this been an issue for you?    o  1-2 months   Location Affected:  Where on the body is this affecting you?    o  Bra line   Quality:  Is there any bleeding, pain, itch, burning/irritation, or redness associated with the skin lesion?    o  Denies   Severity:  Describe any bleeding, pain, itch, burning/irritation, or redness on a scale of 1 to 10 (with 10 being the worst)  o  1   Timing:  Does this condition seem to be there pretty constantly or do you notice it more at specific times throughout the day?    o  Constantly   Context:  Have you ever noticed that this condition seems to be associated with specific activities you do?    o  Denies   Modifying Factors:    o Anything that seems to make the condition worse?    -  Denies  o What have you tried to do to make the condition better? -  Denies   Associated Signs and Symptoms:  Does this skin lesion seem to be associated with any of the followin  ROSACEA    Physical Exam:   Anatomic Location Affected: Face   Morphological Description:  Quiet   Pertinent Positives:   Pertinent Negatives:No regional lympahdenopathy    Additional History of Present Condition:  Present for years    Assessment and Plan:  Based on a thorough discussion of this condition and the management approach to it (including a comprehensive discussion of the known risks, side effects and potential benefits of treatment), the patient (family) agrees to implement the following specific plan:   Continue azelaic acid cream    Rosacea is a chronic rash affecting the mid-face including the nose, cheeks, chin, forehead, and eyelids  The incidence is usually greatest between the ages of 30-60 years and is more common in people with fair skin  Common characteristics include redness, telangiectasias, papules and pustules over affected areas  Rosacea may look similar to acne, but there is a lack of comedones  Occasionally the eyes may also be involved in ocular rosacea  In advanced disease, enlargement of the sebaceous glands in the nose, termed rhinophyma, may be present  Rosacea results in red spots (papules) and sometimes pustules over the face, but unlike acne there are no blackheads, whiteheads, or cystic nodules   Patients often experience increased facial flushing with prominent blood vessels (erythematotelangiectatic rosacea) and dry, sensitive skin  These symptoms are exacerbated by sun exposure, hot or spicy foods, topical steroids and oil-based facial products  In ocular rosacea, eyelids may be red and sore due to conjunctivitis, keratitis, and episcleritis  If rhinophyma develops due to enlargement of sebaceous glands, the patient may have an enlarged and irregularly shaped nose with prominent pores  In rosacea that is refractory to treatment, patients can develop persistent redness and swelling of the face due to lymphatic obstruction (Morbihan disease)  Distribution around the cheeks may be confused with the malar or butterfly rash of lupus  However, the rash of lupus spares the nasal creases and lacks papules and pustules  If signs of photosensitivity, oral ulcers, arthritis, and kidney dysfunction are present then consider referral to a rheumatologist      There are many potential causes of rosacea including genetic, environmental, vascular, and inflammatory factors   These include, but are not limited to:   Chronic exposure to ultraviolet radiation    Increased immune responses in the form of cathelicidins that promote vessel dilation and infiltration with white blood cells (neutrophils) into the dermis   Increased matrix metalloproteinases such as collagen and elastase that remodel normal tissue may contribute to inflammation of the skin making it thicker and harder   There is some evidence to suggest that increased numbers of demodex mites on patient skin may contribute to rosacea papules     General Treatment Approach    Avoid exacerbating factors such as heat, spicy foods, and alcohol    Use daily SPF30+ sunscreen and other methods of coverage for sun protection   Use water-based make-up    Avoid applying topical steroids to affected areas as they can cause perioral dermatitis and exacerbate rosacea     Topical Treatment Approach   Metronidazole cream or gel by itself or in combination with oral antibiotics for more severe cases   Azelaic acid cream or lotion is effective for mild inflammatory rosacea when applied twice daily to affected areas   Brimonidine gel and oxymetazoline hydrochloride cream can reduce facial redness temporarily    Ivermectin cream can treat papulopustular rosacea by controlling demodex mites and inflammation    Pimecrolimus cream or tacrolimus ointment twice a day for 2-3 months can help reduce inflammation    Oral Treatment Approach   Antibiotics such as doxycycline, minocycline, or erythromycin for 1-3 months   Clonidine and carvedilol can help reduce facial flushing and are generally well tolerated  Common side effects include low blood pressure, gastrointestinal upset, dry eyes, blurred vision and low heart rate   Isotretinoin at low doses can be effective for long term treatment when antibiotics fail  Side effects may make it unsuitable for some patients   NSAIDs such as diclofenac can help reduce discomfort and redness in the skin  Procedural/Surgical Treatment Approach    Vascular lasers or intense pulsed light treatment may be used to treat persistent telangiectasia and papulopustular rosacea   Plastic surgery and carbon dioxide lasers may be used to treat rhinophyma       2   DERMATOFIBROMA    Physical Exam:   Anatomic Location Affected:  Left posterior shoulder   Morphological Description:  Firm dermal nodule   Pertinent Positives:   Pertinent Negatives:No regional lymphadenopathy    Additional History of Present Condition:  Present for years    Assessment and Plan:  Based on a thorough discussion of this condition and the management approach to it (including a comprehensive discussion of the known risks, side effects and potential benefits of treatment), the patient (family) agrees to implement the following specific plan:   Reassure benign    Assessment and Plan:  A dermatofibroma is a common benign fibrous nodule that most often arises on the skin of the lower legs  A dermatofibroma is also called a "cutaneous fibrous histiocytoma "  Dermatofibromas occur at all ages and in people of every ethnicity  They are more common in women than in men  It is not clear if dermatofibroma is a reactive process or if it is a neoplasm  The lesions are made up of proliferating fibroblasts  Histiocytes may also be involved  They are sometimes attributed to an insect bite or ingrownhair or local trauma, but not consistently  They may be more numerous in patients with altered immunity  Dermatofibromas most often occur on the legs and arms, but may also arise on the trunk or any site of the body  Typical clinical features include the following:   People may have 1 or up to 15 lesions   Size varies from 0 5-1 5 cm diameter; most lesions are 7-10 mm diameter   They are firm nodules tethered to the skin surface and mobile over subcutaneous tissue   The skin "dimples" on pinching the lesion   Color may be pink to light brown in white skin, and dark brown to black in dark skin; some appear paler in the center   They do not usually cause symptoms, but they are sometimes painful or itchy   Because they are often raised lesions, they may be traumatized, for example by a razor   Occasionally dozens may erupt within a few months, usually in the setting of immunosuppression (for example autoimmune disease, cancer or certain medications)   Dermatofibroma does not give rise to cancer  However, occasionally, it may be mistaken for dermatofibrosarcoma or desmoplastic melanoma  A dermatofibroma is harmless and seldom causes any symptoms  Usually, only reassurance is needed  If it is nuisance or causing concern, the lesion can be removed surgically, resulting in a scar that is, by definition, usually longer in diameter than the widest portion of the dermatofibroma  Cryotherapy, shave biopsy and laser surgery are rarely completely successful  Skin punch biopsy or incisional biopsy may be undertaken if there is an atypical feature such as recent enlargement, ulceration, or asymmetrical structures and colours on dermatoscopy  3  Probable lichen sclerosus/ overlap morphea    Physical Exam:   (Anatomic Location); (Size and Morphological Description); (Differential Diagnosis):   Right upper abdoomen; 0 5 cm -2 cm thick white sclerotic maculeswith with fine scale  White sclerotic patch right upper thigh  10 cm and scleroticPertinent Positives:   Pertinent Negatives: Additional History of Present Condition:     Assessment and Plan:  Based on a thorough discussion of this condition and the management approach to it (including a comprehensive discussion of the known risks, side effects and potential benefits of treatment), the patient (family) agrees to implement the following specific plan:   Punch biopsy      PROCEDURE NOTE:  PUNCH BIOPSY      Performing Physician: Dr Monaco    Anatomic Location; Clinical Description with size (cm); Pre-Op Diagnosis:   o  Right upper abdoomen; 0 5 cm -2 cm thick white sclerotic macules; Sclerotic plaque right upper thigh sclerotic,  perineal sclerosus  Lichen sclerosis vs  Morphea or overlap of both          Anesthesia: 1% xylocaine with epi       Topical anesthesia: None       Indications: To indicate diagnosis and management plan  Procedure Details     Patient informed of the risks (including bleeding,scaring and infection) and benefits of the procedure explained  Verbal and written informed consent obtained  The area was prepped and draped in the usual fashion  Anesthesia was obtained with 1% lidocaine with epinephrine  The skin was then stretched perpendicular to the skin tension lines and a punch biopsy to an appropriate sampling depth was obtained with a 4 mm punch with a forceps and iris scissors       Hemostasis was obtained with 5-0 Ethilon x 1 sutures  Complications:  None      Specimen has been sent for review by Dermatopathology  Plan:  1  Instructed to keep the wound dry and covered for 24-48h and clean thereafter  2  Warning signs of infection were reviewed  3  Recommended that the patient use acetaminophen as needed for pain  4  Sutures if any should be removed in 10 days, patient will remove hersslf      Standard post-procedure care has been explained and has been included in written form within the patient's copy of Informed Consent  2  LICHEN SCLEROSUS ATROPHICUS    Physical Exam:   Anatomic Location Affected:  Superior clitoral lawson, left greater than right, Right buttock   Morphological Description:  White sclerotic areas; 3 cm light wclerotic plaue right buttock   Pertinent Positives:   Pertinent Negatives:No regional lymphadenopathy    Additional History of Present Condition:  Diagnoses 2 weeks ago     Assessment and Plan:  Based on a thorough discussion of this condition and the management approach to it (including a comprehensive discussion of the known risks, side effects and potential benefits of treatment), the patient (family) agrees to implement the following specific plan:   Continue clobetasol ointment twice a day for 2 weeks, the off for 2 weeks, repeat    Lichen sclerosus is a chronic inflammatory skin disorder that most often affects the genital and perianal areas  It is more common in women before puberty or after menopause  It is rare in males, but when present is called "balanitis xerotica obliterans "  Lichen sclerosus can start at any age, although it is most often diagnosed in women over 48  Pre-pubertal children can also be affected   Lichen sclerosis is 10 times more common in women than in men   15% of patients know of a family member with lichen sclerosis     It may follow or co-exist with another skin condition, most often lichen simplex, psoriasis, erosive lichen planus, vitiligo or morphea   People with lichen sclerosus often have a personal or family history of other autoimmune disease such as thyroid disease (about 20% of patients), pernicious anaemia, or alopecia areata  What causes lichen sclerosus? The cause of lichen sclerosus is not fully understood, and may include genetic, hormonal, irritant, traumatic and infectious components  Lichen sclerosis is often classified as an autoimmune disease  Autoimmune disorders arise when the body's natural defenses against foreign or invading organisms (e g , antibodies) begin to attack healthy tissue for unknown reasons   Antibodies to other unknown proteins may account for other cases, explaining differing presentations of lichen sclerosis and response to treatment   However, these antibodies could be epigenetic, ie, the results of disease rather than the cause of disease   Some scientists believe that a genetic predisposition to lichen sclerosus exists  A genetic predisposition means that a person may carry a gene for a disease but it may not be expressed unless something in the environment triggers the disease  Other researchers believe that hormonal, irritant and/or infectious factors (or a combination of these) cause this skin condition  Cases where lichen sclerosus appears on skin after it has been damaged (from an injury or trauma) have been reported  What are the clinical features of lichen sclerosus? Lichen sclerosus usually affects the external genitalia (vulva or penis) and/or the area around the anus (perianal region)  Sometimes, it is accompanied by intense (intractable) itching, burning, and pain  If the disease is severe, even minor abrasions or chaffing can cause bleeding, tearing, and blistering  The scarring that results from untreated lichen sclerosis produces problems with urination, defecation, and intercourse   The presence of thin, easily irritated, and torn skin affects physical activity and clothing choice  For children with lichen sclerosus affecting the perianal region, constipation may be among the first signs of the presence of the disease  Lichen sclerosus is much more likely to affect males that have not been circumcised than males that have been  Rarely, lichen sclerosus can also affect other areas of the skin such as the breast, wrists, shoulder, neck, back, thigh, and the mouth  Skin tissue often becomes thin, shiny, wrinkled and parchment-like  Fissures, cracks, and purplish patches (ecchymoses) appear frequently  The earliest areas of lichen sclerosis exhibit a porcelain white appearing center surrounded by redness  This grows together to form larger areas of lichen sclerosus  The areas that are prone to rubbing and friction can develop blisters or bruising  The long term result of lichen sclerosus are areas of shiny, thin skin that has a tendency to be dry, crack, or bleed  This also produces loss of the normal parts of the external genitals, narrowing of the opening of the urethra/vagina/anus, and phimosis (inability to retract the foreskin) in men  The presence of non-healing ulcers or raised ulcerated areas in the external genitalia of women raises suspicion for the development of squamous cell carcinoma  In males, lichen sclerosus most commonly affects the foreskin of the penis, although it may affect other areas of the body  The opening at the end of the foreskin may become narrow and scarred  Discoloration and skin changes may also occur  Symptoms also include itching, soreness, and painful erections  In men, involvement in the perineal area is rare  In some rare cases, skin lesions may also develop in the mouth  The lesions consist of bluish-white flat irregular patchy areas on the inside of the cheeks and/or palate  The tongue, lips, and gums may also be involved  How do we diagnose lichen sclerosus atrophicus?   Lichen sclerosus is diagnosed by looking at the skin affected  All those affected require a thorough clinical evaluation, identification of characteristic physical features, and a detailed patient history  A biopsy may be needed to confirm diagnosis  Biopsies may also be performed if squamous cell carcinoma is suspected  How do we treat lichen sclerosis? General measures for genital lichen sclerosis   Wash gently once or twice daily   Use a non-soap cleanser, if any   Try to avoid tight clothing, rubbing and scratching   Activities such as riding a bicycle or horse may aggravate symptoms   If incontinent, seek medical advice and treatment   Apply emollients to relieve dryness and itching, and as a barrier to protect sensitive skin in genital and anal areas from contact with urine and feces  Topical steroids are the main treatment for lichen sclerosus  An ultrapotent topical steroid is often prescribed, eg clobetasol propionate 0 05%  A potent topical steroid, eg mometasone furoate 0 1% ointment, may also be used in mild disease or when symptoms are controlled   An ointment base is less likely than cream to sting or to cause contact dermatitis   A thin smear should be precisely applied to the white plaques and rubbed in gently   Most patients will be told to apply the steroid ointment once a day  After one to three months (depending on the severity of the disease), the ointment can be used less often   Topical steroid may need to be continued once or twice a week to control symptoms or to prevent lichen sclerosis recurring   Itch often settles within a few days but it may take weeks to months for the skin to return to normal (if at all)   One 30-g tube of topical steroid should last 3 to 6 months or longer  It is most important to follow instructions carefully and to attend follow-up appointments regularly      Other topical treatments used in patients with lichen sclerosis include:   Intravaginal estrogen cream or pessaries in postmenopausal women  These reduce symptoms due to atrophic vulvovaginitis (dry, thin, fissured and sensitive vulval and vaginal tissues due to hormonal deficiency)   Topical calcineurin inhibitors tacrolimus ointment and pimecrolimus cream instead of or in addition to topical steroids  They tend to cause burning discomfort (at least for the first few days)  Early concern that these medications may have the potential to accelerate cancer growth in the presence of oncogenic human papilloma virus (the cause of genital warts) appears unfounded   Topical retinoid (eg tretinoin cream) is not well tolerated on genital skin but may be applied to other sites affected by lichen sclerosis  It reduces scaling and dryness  Oral medications: when severe, acute, and not responding to topical therapy, systemic treatment may rarely be prescribed  Options include:   Intralesional or systemic corticosteroids    Oral retinoids: acitretin, isotretinoin   Methotrexate   Ciclosporin    Surgery: is essential for high-grade squamous intraepithelial lesions or cancer  In males, circumcision is effective in lichen sclerosus affecting prepuce and glans of the penis  It is best done early if initial topical steroids have not controlled symptoms and signs  If the urethra is stenosed or scarred, reconstructive surgery may be necessary  Unfortunately, lichen sclerosus sometimes closes up the vaginal opening again after surgery has initially appeared successful  It can be repeated  Other reported treatments for lichen sclerosus are considered experimental at this time     CO2 laser ablation of hyperkeratotic plaques   Phototherapy   Photodynamic therapy   Fat injections   Stem cell and platelet-rich plasma injections      Scribe Attestation    I,:   Jp Schuster MA am acting as a scribe while in the presence of the attending physician :        I,:   Ramila Ballesteros MD personally performed the services described in this documentation    as scribed in my presence :

## 2020-07-23 NOTE — TELEPHONE ENCOUNTER
Called patient to discuss how she is managing on her HRT  She states she would not have said that she felt unwell previously and truly just wanted to start the medication for her hot flashes  Those have reduced significantly to only 4-5 per week  She also notes that she just feels better  She feels better energy, better mental clarity and also feels like her voiding difficulty is better  She saw dermatology today who told her that she has some lesions of LS under her breasts as well (biopsy done to confirm)  He agreed with the clobetasol course I had prescribed as well as the idea that estrogen will help these  She has had no adverse reaction to the medication and wishes to continue her current dose  Discussed that progesterone is primary indicated in women with a uterus for protection from endometrial cancer which she does not require  Discussed that it can be used in women without a uterus to help with sleep but since that was not a primary complaint of hers that I did not see the need to add an additional medication  She states she has never been a great sleeper so this did not seem to her to be a problem of menopause but she does feel that she is sleeping better on the estrogen patch  She agrees with plan to just observe on this dose for now and not add progesterone  All questions answered

## 2020-07-23 NOTE — LETTER
July 23, 2020     Kleber Ferrell MD  [address]  [address] [place] [id number]    Patient: Xiang Mccann   YOB: 1960   Date of Visit: 7/23/2020       Dear Dr Janeen Velasquez:    Thank you for referring Xiang Mccann to me for evaluation  Below are the relevant portions of my assessment and plan of care  If you have questions, please do not hesitate to call me  I look forward to following Roberta Boss along with you           Sincerely,        Sage Ziegler MD        CC: Harmony Cherry MD

## 2020-07-23 NOTE — PATIENT INSTRUCTIONS
1  ROSACEA    Physical Exam:   Anatomic Location Affected: Face    Assessment and Plan:  Based on a thorough discussion of this condition and the management approach to it (including a comprehensive discussion of the known risks, side effects and potential benefits of treatment), the patient (family) agrees to implement the following specific plan:   Continue azelaic acid cream    Rosacea is a chronic rash affecting the mid-face including the nose, cheeks, chin, forehead, and eyelids  The incidence is usually greatest between the ages of 30-60 years and is more common in people with fair skin  Common characteristics include redness, telangiectasias, papules and pustules over affected areas  Rosacea may look similar to acne, but there is a lack of comedones  Occasionally the eyes may also be involved in ocular rosacea  In advanced disease, enlargement of the sebaceous glands in the nose, termed rhinophyma, may be present  Rosacea results in red spots (papules) and sometimes pustules over the face, but unlike acne there are no blackheads, whiteheads, or cystic nodules  Patients often experience increased facial flushing with prominent blood vessels (erythematotelangiectatic rosacea) and dry, sensitive skin  These symptoms are exacerbated by sun exposure, hot or spicy foods, topical steroids and oil-based facial products  In ocular rosacea, eyelids may be red and sore due to conjunctivitis, keratitis, and episcleritis  If rhinophyma develops due to enlargement of sebaceous glands, the patient may have an enlarged and irregularly shaped nose with prominent pores  In rosacea that is refractory to treatment, patients can develop persistent redness and swelling of the face due to lymphatic obstruction (Morbihan disease)  Distribution around the cheeks may be confused with the malar or butterfly rash of lupus  However, the rash of lupus spares the nasal creases and lacks papules and pustules   If signs of photosensitivity, oral ulcers, arthritis, and kidney dysfunction are present then consider referral to a rheumatologist      There are many potential causes of rosacea including genetic, environmental, vascular, and inflammatory factors  These include, but are not limited to:   Chronic exposure to ultraviolet radiation    Increased immune responses in the form of cathelicidins that promote vessel dilation and infiltration with white blood cells (neutrophils) into the dermis   Increased matrix metalloproteinases such as collagen and elastase that remodel normal tissue may contribute to inflammation of the skin making it thicker and harder   There is some evidence to suggest that increased numbers of demodex mites on patient skin may contribute to rosacea papules     General Treatment Approach    Avoid exacerbating factors such as heat, spicy foods, and alcohol    Use daily SPF30+ sunscreen and other methods of coverage for sun protection   Use water-based make-up    Avoid applying topical steroids to affected areas as they can cause perioral dermatitis and exacerbate rosacea     Topical Treatment Approach   Metronidazole cream or gel by itself or in combination with oral antibiotics for more severe cases   Azelaic acid cream or lotion is effective for mild inflammatory rosacea when applied twice daily to affected areas   Brimonidine gel and oxymetazoline hydrochloride cream can reduce facial redness temporarily    Ivermectin cream can treat papulopustular rosacea by controlling demodex mites and inflammation    Pimecrolimus cream or tacrolimus ointment twice a day for 2-3 months can help reduce inflammation    Oral Treatment Approach   Antibiotics such as doxycycline, minocycline, or erythromycin for 1-3 months   Clonidine and carvedilol can help reduce facial flushing and are generally well tolerated   Common side effects include low blood pressure, gastrointestinal upset, dry eyes, blurred vision and low heart rate   Isotretinoin at low doses can be effective for long term treatment when antibiotics fail  Side effects may make it unsuitable for some patients   NSAIDs such as diclofenac can help reduce discomfort and redness in the skin  Procedural/Surgical Treatment Approach    Vascular lasers or intense pulsed light treatment may be used to treat persistent telangiectasia and papulopustular rosacea   Plastic surgery and carbon dioxide lasers may be used to treat rhinophyma       2  DERMATOFIBROMA    Physical Exam:   Anatomic Location Affected:  Left posterior shoulder    Assessment and Plan:  Based on a thorough discussion of this condition and the management approach to it (including a comprehensive discussion of the known risks, side effects and potential benefits of treatment), the patient (family) agrees to implement the following specific plan:   Reassure benign    Assessment and Plan:  A dermatofibroma is a common benign fibrous nodule that most often arises on the skin of the lower legs  A dermatofibroma is also called a "cutaneous fibrous histiocytoma "  Dermatofibromas occur at all ages and in people of every ethnicity  They are more common in women than in men  It is not clear if dermatofibroma is a reactive process or if it is a neoplasm  The lesions are made up of proliferating fibroblasts  Histiocytes may also be involved  They are sometimes attributed to an insect bite or ingrownhair or local trauma, but not consistently  They may be more numerous in patients with altered immunity  Dermatofibromas most often occur on the legs and arms, but may also arise on the trunk or any site of the body  Typical clinical features include the following:   People may have 1 or up to 15 lesions   Size varies from 0 5-1 5 cm diameter; most lesions are 7-10 mm diameter   They are firm nodules tethered to the skin surface and mobile over subcutaneous tissue     The skin "dimples" on pinching the lesion   Color may be pink to light brown in white skin, and dark brown to black in dark skin; some appear paler in the center   They do not usually cause symptoms, but they are sometimes painful or itchy   Because they are often raised lesions, they may be traumatized, for example by a razor   Occasionally dozens may erupt within a few months, usually in the setting of immunosuppression (for example autoimmune disease, cancer or certain medications)   Dermatofibroma does not give rise to cancer  However, occasionally, it may be mistaken for dermatofibrosarcoma or desmoplastic melanoma  A dermatofibroma is harmless and seldom causes any symptoms  Usually, only reassurance is needed  If it is nuisance or causing concern, the lesion can be removed surgically, resulting in a scar that is, by definition, usually longer in diameter than the widest portion of the dermatofibroma  Cryotherapy, shave biopsy and laser surgery are rarely completely successful  Skin punch biopsy or incisional biopsy may be undertaken if there is an atypical feature such as recent enlargement, ulceration, or asymmetrical structures and colours on dermatoscopy  3  RASH    Physical Exam:   (Anatomic Location); (Size and Morphological Description); (Differential Diagnosis):  o Right upper abdoomen; 0 5 cm -2 cm thick white sclerotic macules; Lichen sclerosis vs  morphea       Additional History of Present Condition:     Assessment and Plan:  Based on a thorough discussion of this condition and the management approach to it (including a comprehensive discussion of the known risks, side effects and potential benefits of treatment), the patient (family) agrees to implement the following specific plan:   Punch biopsy      Plan:  1  Instructed to keep the wound dry and covered for 24-48h and clean thereafter  2  Warning signs of infection were reviewed      3  Recommended that the patient use acetaminophen as needed for pain  4  Sutures if any should be removed in 10 days, patient will remove hersslf      Standard post-procedure care has been explained and has been included in written form within the patient's copy of Informed Consent  2  LICHEN SCLEROSUS ATROPHICUS    Physical Exam:   Anatomic Location Affected:  Superior clitoral lawson, left greater than right, Right buttock    Assessment and Plan:  Based on a thorough discussion of this condition and the management approach to it (including a comprehensive discussion of the known risks, side effects and potential benefits of treatment), the patient (family) agrees to implement the following specific plan:   Continue clobetasol ointment twice a day for 2 weeks, the off for 2 weeks, repeat    Lichen sclerosus is a chronic inflammatory skin disorder that most often affects the genital and perianal areas  It is more common in women before puberty or after menopause  It is rare in males, but when present is called "balanitis xerotica obliterans "  Lichen sclerosus can start at any age, although it is most often diagnosed in women over 48  Pre-pubertal children can also be affected   Lichen sclerosis is 10 times more common in women than in men   15% of patients know of a family member with lichen sclerosis   It may follow or co-exist with another skin condition, most often lichen simplex, psoriasis, erosive lichen planus, vitiligo or morphea   People with lichen sclerosus often have a personal or family history of other autoimmune disease such as thyroid disease (about 20% of patients), pernicious anaemia, or alopecia areata  What causes lichen sclerosus? The cause of lichen sclerosus is not fully understood, and may include genetic, hormonal, irritant, traumatic and infectious components  Lichen sclerosis is often classified as an autoimmune disease   Autoimmune disorders arise when the body's natural defenses against foreign or invading organisms (e g , antibodies) begin to attack healthy tissue for unknown reasons   Antibodies to other unknown proteins may account for other cases, explaining differing presentations of lichen sclerosis and response to treatment   However, these antibodies could be epigenetic, ie, the results of disease rather than the cause of disease   Some scientists believe that a genetic predisposition to lichen sclerosus exists  A genetic predisposition means that a person may carry a gene for a disease but it may not be expressed unless something in the environment triggers the disease  Other researchers believe that hormonal, irritant and/or infectious factors (or a combination of these) cause this skin condition  Cases where lichen sclerosus appears on skin after it has been damaged (from an injury or trauma) have been reported  What are the clinical features of lichen sclerosus? Lichen sclerosus usually affects the external genitalia (vulva or penis) and/or the area around the anus (perianal region)  Sometimes, it is accompanied by intense (intractable) itching, burning, and pain  If the disease is severe, even minor abrasions or chaffing can cause bleeding, tearing, and blistering  The scarring that results from untreated lichen sclerosis produces problems with urination, defecation, and intercourse  The presence of thin, easily irritated, and torn skin affects physical activity and clothing choice  For children with lichen sclerosus affecting the perianal region, constipation may be among the first signs of the presence of the disease  Lichen sclerosus is much more likely to affect males that have not been circumcised than males that have been  Rarely, lichen sclerosus can also affect other areas of the skin such as the breast, wrists, shoulder, neck, back, thigh, and the mouth  Skin tissue often becomes thin, shiny, wrinkled and parchment-like  Fissures, cracks, and purplish patches (ecchymoses) appear frequently   The earliest areas of lichen sclerosis exhibit a porcelain white appearing center surrounded by redness  This grows together to form larger areas of lichen sclerosus  The areas that are prone to rubbing and friction can develop blisters or bruising  The long term result of lichen sclerosus are areas of shiny, thin skin that has a tendency to be dry, crack, or bleed  This also produces loss of the normal parts of the external genitals, narrowing of the opening of the urethra/vagina/anus, and phimosis (inability to retract the foreskin) in men  The presence of non-healing ulcers or raised ulcerated areas in the external genitalia of women raises suspicion for the development of squamous cell carcinoma  In males, lichen sclerosus most commonly affects the foreskin of the penis, although it may affect other areas of the body  The opening at the end of the foreskin may become narrow and scarred  Discoloration and skin changes may also occur  Symptoms also include itching, soreness, and painful erections  In men, involvement in the perineal area is rare  In some rare cases, skin lesions may also develop in the mouth  The lesions consist of bluish-white flat irregular patchy areas on the inside of the cheeks and/or palate  The tongue, lips, and gums may also be involved  How do we diagnose lichen sclerosus atrophicus? Lichen sclerosus is diagnosed by looking at the skin affected  All those affected require a thorough clinical evaluation, identification of characteristic physical features, and a detailed patient history  A biopsy may be needed to confirm diagnosis  Biopsies may also be performed if squamous cell carcinoma is suspected  How do we treat lichen sclerosis? General measures for genital lichen sclerosis   Wash gently once or twice daily   Use a non-soap cleanser, if any   Try to avoid tight clothing, rubbing and scratching     Activities such as riding a bicycle or horse may aggravate symptoms   If incontinent, seek medical advice and treatment   Apply emollients to relieve dryness and itching, and as a barrier to protect sensitive skin in genital and anal areas from contact with urine and feces  Topical steroids are the main treatment for lichen sclerosus  An ultrapotent topical steroid is often prescribed, eg clobetasol propionate 0 05%  A potent topical steroid, eg mometasone furoate 0 1% ointment, may also be used in mild disease or when symptoms are controlled   An ointment base is less likely than cream to sting or to cause contact dermatitis   A thin smear should be precisely applied to the white plaques and rubbed in gently   Most patients will be told to apply the steroid ointment once a day  After one to three months (depending on the severity of the disease), the ointment can be used less often   Topical steroid may need to be continued once or twice a week to control symptoms or to prevent lichen sclerosis recurring   Itch often settles within a few days but it may take weeks to months for the skin to return to normal (if at all)   One 30-g tube of topical steroid should last 3 to 6 months or longer  It is most important to follow instructions carefully and to attend follow-up appointments regularly  Other topical treatments used in patients with lichen sclerosis include:   Intravaginal estrogen cream or pessaries in postmenopausal women  These reduce symptoms due to atrophic vulvovaginitis (dry, thin, fissured and sensitive vulval and vaginal tissues due to hormonal deficiency)   Topical calcineurin inhibitors tacrolimus ointment and pimecrolimus cream instead of or in addition to topical steroids  They tend to cause burning discomfort (at least for the first few days)   Early concern that these medications may have the potential to accelerate cancer growth in the presence of oncogenic human papilloma virus (the cause of genital warts) appears unfounded   Topical retinoid (eg tretinoin cream) is not well tolerated on genital skin but may be applied to other sites affected by lichen sclerosis  It reduces scaling and dryness  Oral medications: when severe, acute, and not responding to topical therapy, systemic treatment may rarely be prescribed  Options include:   Intralesional or systemic corticosteroids    Oral retinoids: acitretin, isotretinoin   Methotrexate   Ciclosporin    Surgery: is essential for high-grade squamous intraepithelial lesions or cancer  In males, circumcision is effective in lichen sclerosus affecting prepuce and glans of the penis  It is best done early if initial topical steroids have not controlled symptoms and signs  If the urethra is stenosed or scarred, reconstructive surgery may be necessary  Unfortunately, lichen sclerosus sometimes closes up the vaginal opening again after surgery has initially appeared successful  It can be repeated  Other reported treatments for lichen sclerosus are considered experimental at this time     CO2 laser ablation of hyperkeratotic plaques   Phototherapy   Photodynamic therapy   Fat injections   Stem cell and platelet-rich plasma injections

## 2020-07-28 DIAGNOSIS — L90.0 LICHEN SCLEROSUS ET ATROPHICUS: Primary | ICD-10-CM

## 2020-07-28 RX ORDER — TACROLIMUS 1 MG/G
OINTMENT TOPICAL 2 TIMES DAILY
Qty: 100 G | Refills: 1 | Status: SHIPPED | OUTPATIENT
Start: 2020-07-28 | End: 2020-12-31 | Stop reason: SDUPTHER

## 2020-07-28 NOTE — RESULT ENCOUNTER NOTE
DERMATOPATHOLOGY RESULT NOTE    Results reviewed by ordering physician  Called patient to personally discuss results  I discussed biopsy is consistent with lichen sclerosus  I noted that clinically lichen sclerosus involves perineum, groin, and inframammary areas  There is some overlap apparent in her right groin with morphea  Its a disease of autoimmunity though unless clinically suggested screening for autoimmunity not indicated  I discussed that goal of treatment is to treat new active areas as scarring is a sequelae of this disease  I recommended that we treat labial with clobetasol daily for two weeks then maintenance of twice weekly on active areas  For remainder of skin clobetasol for 2 weeks then maintenance tacrolimus for persistent red spots  If just scarred appearance no active treatment  Will followup at 2 months  Instructions for Clinical Derm Team:   (remember to route Result Note to appropriate staff):    2 month followup    Result & Plan by Specimen:    Specimen A: benign  Plan: monitor          Tissue Exam: [id number]   Order: [id number]   Status:  Final result   Visible to patient:  No (Not Released) Dx:  Rash   Component   Case Report  Surgical Pathology Report                         Case: [id number]                                   Authorizing Provider: Herbie Rueda MD          Collected:           07/23/2020 1015              Ordering Location:     [facility]      Received:            07/23/2020 1015                                     [place]                                                                       Pathologist:           Blythe Denver, MD                                                           Specimen:    Skin, Other, Right upper abdomen                                                         Final Diagnosis  A   Skin, right upper abdomen, punch biopsy:     Consistent with LICHEN SCLEROSUS (see note)      Note: Pathogenic microorganisms are not seen on PAS stain  In addition to papillary dermal hyalinization, prominent papillary dermal edema resulting in subepidermal vesiculation is present  Scattered dermal eosinophils are seen  Clinical pathological correlation is advised         Electronically signed by Pia Vinson MD on 7/27/2020 at  4:09 PM  Additional Information   All reported additional testing was performed with appropriately reactive controls   These tests were developed and their performance characteristics determined by [facility] or appropriate performing facility, though some tests may be performed on tissues which have not been validated for performance characteristics (such as staining performed on alcohol exposed cell blocks and decalcified tissues)   Results should be interpreted with caution and in the context of the patients' clinical condition  These tests may not be cleared or approved by the [place]  Food and Drug Administration, though the FDA has determined that such clearance or approval is not necessary  These tests are used for clinical purposes and they should not be regarded as investigational or for research  This laboratory has been approved by [place], designated as a high-complexity laboratory and is qualified to perform these tests  Vicenta Barnes Description   A  The specimen is received in formalin, labeled with the patient's name and hospital number, and is designated "right upper abdomen"  The specimen consists of a 0 4 x 0 3 cm punch biopsy of tan non hair-bearing skin excised to a depth of 0 5 cm  The epithelial surface appears keratotic and is inked red and the margin of resection is inked green  The specimen is bisected revealing tan grossly unremarkable cut surfaces    The specimen is entirely submitted between sponges, 1 cassette      Note: The estimated total formalin fixation time based upon information provided by the submitting clinician and the standard processing schedule is under 72 morgan Lares     Clinical Information   A  Right upper abdomen; Skin; 4 mm punch biopsy; 62year old female with  Thick sclerotic scaling macules; Differential diagnosis;  Lichen sclerosis vs  Morphea                    *

## 2020-08-21 ENCOUNTER — APPOINTMENT (OUTPATIENT)
Dept: LAB | Age: 60
End: 2020-08-21
Payer: COMMERCIAL

## 2020-08-21 ENCOUNTER — APPOINTMENT (OUTPATIENT)
Dept: LAB | Facility: HOSPITAL | Age: 60
End: 2020-08-21
Payer: COMMERCIAL

## 2020-08-21 ENCOUNTER — TRANSCRIBE ORDERS (OUTPATIENT)
Dept: LAB | Facility: HOSPITAL | Age: 60
End: 2020-08-21

## 2020-08-21 DIAGNOSIS — E55.9 VITAMIN D DEFICIENCY: ICD-10-CM

## 2020-08-21 DIAGNOSIS — Z13.1 SCREENING FOR DIABETES MELLITUS: ICD-10-CM

## 2020-08-21 DIAGNOSIS — E78.1 HYPERTRIGLYCERIDEMIA: ICD-10-CM

## 2020-08-21 DIAGNOSIS — Z13.0 SCREENING FOR DEFICIENCY ANEMIA: ICD-10-CM

## 2020-08-21 LAB
25(OH)D3 SERPL-MCNC: 33.5 NG/ML (ref 30–100)
ALBUMIN SERPL BCP-MCNC: 3.8 G/DL (ref 3.5–5)
ALP SERPL-CCNC: 77 U/L (ref 46–116)
ALT SERPL W P-5'-P-CCNC: 25 U/L (ref 12–78)
ANION GAP SERPL CALCULATED.3IONS-SCNC: 8 MMOL/L (ref 4–13)
AST SERPL W P-5'-P-CCNC: 10 U/L (ref 5–45)
BASOPHILS # BLD AUTO: 0.05 THOUSANDS/ΜL (ref 0–0.1)
BASOPHILS NFR BLD AUTO: 1 % (ref 0–1)
BILIRUB SERPL-MCNC: 0.41 MG/DL (ref 0.2–1)
BILIRUB UR QL STRIP: NEGATIVE
BUN SERPL-MCNC: 18 MG/DL (ref 5–25)
CALCIUM SERPL-MCNC: 8.7 MG/DL (ref 8.3–10.1)
CHLORIDE SERPL-SCNC: 110 MMOL/L (ref 100–108)
CHOLEST SERPL-MCNC: 217 MG/DL (ref 50–200)
CLARITY UR: CLEAR
CO2 SERPL-SCNC: 23 MMOL/L (ref 21–32)
COLOR UR: YELLOW
CREAT SERPL-MCNC: 0.76 MG/DL (ref 0.6–1.3)
EOSINOPHIL # BLD AUTO: 0.16 THOUSAND/ΜL (ref 0–0.61)
EOSINOPHIL NFR BLD AUTO: 3 % (ref 0–6)
ERYTHROCYTE [DISTWIDTH] IN BLOOD BY AUTOMATED COUNT: 13.4 % (ref 11.6–15.1)
GFR SERPL CREATININE-BSD FRML MDRD: 85 ML/MIN/1.73SQ M
GLUCOSE P FAST SERPL-MCNC: 100 MG/DL (ref 65–99)
GLUCOSE UR STRIP-MCNC: NEGATIVE MG/DL
HCT VFR BLD AUTO: 44.5 % (ref 34.8–46.1)
HDLC SERPL-MCNC: 61 MG/DL
HGB BLD-MCNC: 14.6 G/DL (ref 11.5–15.4)
HGB UR QL STRIP.AUTO: NEGATIVE
IMM GRANULOCYTES # BLD AUTO: 0.01 THOUSAND/UL (ref 0–0.2)
IMM GRANULOCYTES NFR BLD AUTO: 0 % (ref 0–2)
KETONES UR STRIP-MCNC: NEGATIVE MG/DL
LDLC SERPL CALC-MCNC: 129 MG/DL (ref 0–100)
LEUKOCYTE ESTERASE UR QL STRIP: NEGATIVE
LYMPHOCYTES # BLD AUTO: 1.77 THOUSANDS/ΜL (ref 0.6–4.47)
LYMPHOCYTES NFR BLD AUTO: 29 % (ref 14–44)
MCH RBC QN AUTO: 30.2 PG (ref 26.8–34.3)
MCHC RBC AUTO-ENTMCNC: 32.8 G/DL (ref 31.4–37.4)
MCV RBC AUTO: 92 FL (ref 82–98)
MONOCYTES # BLD AUTO: 0.42 THOUSAND/ΜL (ref 0.17–1.22)
MONOCYTES NFR BLD AUTO: 7 % (ref 4–12)
NEUTROPHILS # BLD AUTO: 3.69 THOUSANDS/ΜL (ref 1.85–7.62)
NEUTS SEG NFR BLD AUTO: 60 % (ref 43–75)
NITRITE UR QL STRIP: NEGATIVE
NONHDLC SERPL-MCNC: 156 MG/DL
NRBC BLD AUTO-RTO: 0 /100 WBCS
PH UR STRIP.AUTO: 5.5 [PH]
PLATELET # BLD AUTO: 336 THOUSANDS/UL (ref 149–390)
PMV BLD AUTO: 11.7 FL (ref 8.9–12.7)
POTASSIUM SERPL-SCNC: 3.7 MMOL/L (ref 3.5–5.3)
PROT SERPL-MCNC: 7.3 G/DL (ref 6.4–8.2)
PROT UR STRIP-MCNC: NEGATIVE MG/DL
RBC # BLD AUTO: 4.84 MILLION/UL (ref 3.81–5.12)
SODIUM SERPL-SCNC: 141 MMOL/L (ref 136–145)
SP GR UR STRIP.AUTO: 1.02 (ref 1–1.03)
TRIGL SERPL-MCNC: 134 MG/DL
UROBILINOGEN UR QL STRIP.AUTO: 0.2 E.U./DL
WBC # BLD AUTO: 6.1 THOUSAND/UL (ref 4.31–10.16)

## 2020-08-21 PROCEDURE — 85025 COMPLETE CBC W/AUTO DIFF WBC: CPT

## 2020-08-21 PROCEDURE — 80053 COMPREHEN METABOLIC PANEL: CPT

## 2020-08-21 PROCEDURE — 80061 LIPID PANEL: CPT

## 2020-08-21 PROCEDURE — 81003 URINALYSIS AUTO W/O SCOPE: CPT | Performed by: INTERNAL MEDICINE

## 2020-08-21 PROCEDURE — 82306 VITAMIN D 25 HYDROXY: CPT

## 2020-08-21 PROCEDURE — 36415 COLL VENOUS BLD VENIPUNCTURE: CPT

## 2020-08-22 ENCOUNTER — APPOINTMENT (OUTPATIENT)
Dept: LAB | Age: 60
End: 2020-08-22
Payer: COMMERCIAL

## 2020-08-22 DIAGNOSIS — Z12.11 COLON CANCER SCREENING: ICD-10-CM

## 2020-08-22 LAB — HEMOCCULT STL QL IA: NEGATIVE

## 2020-08-22 PROCEDURE — G0328 FECAL BLOOD SCRN IMMUNOASSAY: HCPCS

## 2020-08-24 ENCOUNTER — TELEPHONE (OUTPATIENT)
Dept: DERMATOLOGY | Facility: CLINIC | Age: 60
End: 2020-08-24

## 2020-08-25 NOTE — TELEPHONE ENCOUNTER
Patient called back this morning asking to change her appointment  That was scheduled on 09/25/20 saying she would be on vacation and will be returning on 10/05/2020  She was scheduled to 10/13 to see Dr Pamela Massey  She wanted to let Dr Pamela Massey know that it is past her six weeks FU, if he is okay with it, it's okay with her

## 2020-08-25 NOTE — TELEPHONE ENCOUNTER
Patient was called to let her know Dr Roosevelt Beckett said the new date is fine  LVM to let her know

## 2020-08-26 ENCOUNTER — OFFICE VISIT (OUTPATIENT)
Dept: INTERNAL MEDICINE CLINIC | Facility: CLINIC | Age: 60
End: 2020-08-26
Payer: COMMERCIAL

## 2020-08-26 VITALS
OXYGEN SATURATION: 98 % | TEMPERATURE: 97.6 F | HEART RATE: 67 BPM | BODY MASS INDEX: 35.83 KG/M2 | SYSTOLIC BLOOD PRESSURE: 124 MMHG | HEIGHT: 63 IN | RESPIRATION RATE: 16 BRPM | WEIGHT: 202.2 LBS | DIASTOLIC BLOOD PRESSURE: 74 MMHG

## 2020-08-26 DIAGNOSIS — Z00.00 EXAMINATION, GENERAL MEDICAL: Primary | ICD-10-CM

## 2020-08-26 DIAGNOSIS — Z13.6 SCREENING FOR HEART DISEASE: ICD-10-CM

## 2020-08-26 DIAGNOSIS — E66.9 OBESITY (BMI 30-39.9): ICD-10-CM

## 2020-08-26 DIAGNOSIS — E78.5 HYPERLIPIDEMIA, UNSPECIFIED HYPERLIPIDEMIA TYPE: ICD-10-CM

## 2020-08-26 DIAGNOSIS — E74.39 GLUCOSE INTOLERANCE: ICD-10-CM

## 2020-08-26 PROBLEM — Z88.0 PENICILLIN ALLERGY: Status: RESOLVED | Noted: 2020-01-26 | Resolved: 2020-08-26

## 2020-08-26 PROBLEM — M67.40 GANGLION CYST: Status: RESOLVED | Noted: 2019-03-22 | Resolved: 2020-08-26

## 2020-08-26 PROBLEM — M19.042 PRIMARY OSTEOARTHRITIS OF BOTH HANDS: Status: RESOLVED | Noted: 2018-07-06 | Resolved: 2020-08-26

## 2020-08-26 PROBLEM — L02.411 ABSCESS OF RIGHT AXILLA: Status: RESOLVED | Noted: 2019-09-06 | Resolved: 2020-08-26

## 2020-08-26 PROBLEM — L50.0 ALLERGIC URTICARIA: Status: RESOLVED | Noted: 2020-01-15 | Resolved: 2020-08-26

## 2020-08-26 PROBLEM — M19.041 PRIMARY OSTEOARTHRITIS OF BOTH HANDS: Status: RESOLVED | Noted: 2018-07-06 | Resolved: 2020-08-26

## 2020-08-26 PROCEDURE — 93000 ELECTROCARDIOGRAM COMPLETE: CPT | Performed by: INTERNAL MEDICINE

## 2020-08-26 PROCEDURE — 99396 PREV VISIT EST AGE 40-64: CPT | Performed by: INTERNAL MEDICINE

## 2020-08-26 NOTE — ASSESSMENT & PLAN NOTE
Present body mass index is 35 82 which represents of value above recommended 18-25  Recommend the patient work on reducing total calorie consumption and also work on regular physical exercise the 3 or more times per week  Benefits of weight reduction are well understood by the patient

## 2020-08-26 NOTE — ASSESSMENT & PLAN NOTE
Lipid profile was reviewed during today's visit with the patient it does show an upward trend in her total cholesterol and LDL readings  I have requested that she work on adjusting her diet over the next several months and have provided her with patient Education in materials  I have requested to follow-up lipid profile in 4 months    Will review the results with the patient when they are available

## 2020-08-26 NOTE — PROGRESS NOTES
Assessment/Plan:    Obesity (BMI 30-39  9)  Present body mass index is 35 82 which represents of value above recommended 18-25  Recommend the patient work on reducing total calorie consumption and also work on regular physical exercise the 3 or more times per week  Benefits of weight reduction are well understood by the patient  Hyperlipidemia  Lipid profile was reviewed during today's visit with the patient it does show an upward trend in her total cholesterol and LDL readings  I have requested that she work on adjusting her diet over the next several months and have provided her with patient Education in materials  I have requested to follow-up lipid profile in 4 months  Will review the results with the patient when they are available    Glucose intolerance  Top normal glucose reading noted with a reading of 100 today recommend reduction of concentrated sugars and carbohydrates along with weight reduction  Examination, general medical  General physical examination today looks healthy and stable at the current time recommend follow-up in 1 year for complete exam blood work and continuation of regular colon screening for colon cancer mammography for breast cancer  Recommend         Problem List Items Addressed This Visit        Other    Hyperlipidemia - Primary     Lipid profile was reviewed during today's visit with the patient it does show an upward trend in her total cholesterol and LDL readings  I have requested that she work on adjusting her diet over the next several months and have provided her with patient Education in materials  I have requested to follow-up lipid profile in 4 months  Will review the results with the patient when they are available         Relevant Orders    Lipid panel    Glucose intolerance     Top normal glucose reading noted with a reading of 100 today recommend reduction of concentrated sugars and carbohydrates along with weight reduction  Obesity (BMI 30-39  9) Present body mass index is 35 82 which represents of value above recommended 18-25  Recommend the patient work on reducing total calorie consumption and also work on regular physical exercise the 3 or more times per week  Benefits of weight reduction are well understood by the patient  Examination, general medical     General physical examination today looks healthy and stable at the current time recommend follow-up in 1 year for complete exam blood work and continuation of regular colon screening for colon cancer mammography for breast cancer  Recommend           Other Visit Diagnoses     Screening for heart disease        Relevant Orders    POCT ECG (Completed)            Subjective:      Patient ID: Viv Cortes is a 62 y o  female  This is an annual complete physical examination review of blood work and screening electrocardiogram for this 49-year-old female patient  She presents today with no complaints  She has had no recent chest pain palpitations or shortness of breath  She denies any active COVID-19 symptoms  The following portions of the patient's history were reviewed and updated as appropriate:   She  has a past medical history of Left knee injury, Menometrorrhagia, Metrorrhagia, Mitral valve prolapse syndrome, Osteopenia, Rosacea, and Thyroid nodule  She   Patient Active Problem List    Diagnosis Date Noted    Hyperlipidemia 2018    Glucose intolerance 2018    Obesity (BMI 30-39 9) 2018    Examination, general medical 2018     She  has a past surgical history that includes  section, low transverse; Colonoscopy; Fracture surgery (Right); pr hysteroscopy,w/endo bx (N/A, 10/18/2016); Polypectomy (N/A, 10/18/2016); pr laparoscopy w tot hysterectuterus <=250 gram  w tube/ovary (N/A, 2018); CYSTOSCOPY (N/A, 2018); Dilation and curettage of uterus; Hysterectomy (); and Oophorectomy (Bilateral, 2018)    Her family history includes Allergies in her sister; Diabetes in her father and mother; Endometrial cancer (age of onset: 36) in her sister; Heart disease in her father and mother; Hypertension in her family; Neurodegenerative disease in her sister; No Known Problems in her brother and brother; Ovarian cancer (age of onset: 79) in her paternal aunt; Prostate cancer (age of onset: 58) in her brother; Stroke in her mother; Thyroid cancer in her sister; Thyroid disease in her mother; Uterine cancer in her sister; Lionel syndrome in her sister  She  reports that she quit smoking about 20 years ago  She has a 10 00 pack-year smoking history  She has never used smokeless tobacco  She reports current alcohol use of about 1 0 standard drinks of alcohol per week  She reports that she does not use drugs  Current Outpatient Medications   Medication Sig Dispense Refill    Azelaic Acid 15 % cream APPLY TO ROSACEA ONCE EVERY DAY 50 g 3    calcium polycarbophil (FIBER-CAPS) 625 mg tablet Take by mouth      clobetasol (TEMOVATE) 0 05 % ointment Apply topically 2 (two) times a day Apply topically twice a day for 2 weeks, then off 2 weeks, then repeat 60 g 2    econazole nitrate 1 % cream       ergocalciferol (VITAMIN D2) 50,000 units Take 5,000 Units by mouth daily       estradiol (CLIMARA) 0 05 mg/24 hr Place 1 patch on the skin once a week 12 patch 3    tacrolimus (PROTOPIC) 0 1 % ointment Apply topically 2 (two) times a day Apply twice daily to active red skin areas of lichen sclerosus 286 g 1     No current facility-administered medications for this visit       Review of Systems   All other systems reviewed and are negative  Objective:      /74   Pulse 67   Temp 97 6 °F (36 4 °C)   Resp 16   Ht 5' 3" (1 6 m)   Wt 91 7 kg (202 lb 3 2 oz)   LMP 10/03/2016 (Approximate)   SpO2 98%   BMI 35 82 kg/m²          Physical Exam  Vitals signs reviewed  Constitutional:       General: She is not in acute distress       Appearance: Normal appearance  She is well-developed  She is not ill-appearing  HENT:      Right Ear: Hearing, tympanic membrane, ear canal and external ear normal       Left Ear: Hearing, tympanic membrane, ear canal and external ear normal       Nose: Nose normal  No mucosal edema  Mouth/Throat:      Mouth: Mucous membranes are moist       Pharynx: Oropharynx is clear  Uvula midline  Eyes:      General: Lids are normal  No scleral icterus  Right eye: No discharge  Left eye: No discharge  Conjunctiva/sclera: Conjunctivae normal       Pupils: Pupils are equal, round, and reactive to light  Neck:      Thyroid: No thyromegaly  Vascular: No carotid bruit or JVD  Cardiovascular:      Rate and Rhythm: Normal rate and regular rhythm  Heart sounds: Normal heart sounds  No murmur  Pulmonary:      Effort: Pulmonary effort is normal  No respiratory distress  Breath sounds: Normal breath sounds  No wheezing, rhonchi or rales  Abdominal:      General: Bowel sounds are normal  There is no distension  Palpations: Abdomen is soft  There is no mass  Tenderness: There is no abdominal tenderness  Musculoskeletal: Normal range of motion  General: No swelling or tenderness  Right lower leg: No edema  Left lower leg: No edema  Lymphadenopathy:      Cervical: No cervical adenopathy  Skin:     General: Skin is warm and dry  Coloration: Skin is not jaundiced or pale  Findings: No erythema  Neurological:      General: No focal deficit present  Mental Status: She is alert and oriented to person, place, and time  Mental status is at baseline  Deep Tendon Reflexes: Reflexes are normal and symmetric  Reflexes normal    Psychiatric:         Speech: Speech normal          Behavior: Behavior normal  Behavior is cooperative  Thought Content: Thought content normal          Judgment: Judgment normal        BMI Counseling:  Body mass index is 35 82 kg/m²  The BMI is above normal  Nutrition recommendations include reducing portion sizes, decreasing overall calorie intake, reducing intake of saturated fat and trans fat and reducing intake of cholesterol  Exercise recommendations include exercising 3-5 times per week

## 2020-08-26 NOTE — ASSESSMENT & PLAN NOTE
General physical examination today looks healthy and stable at the current time recommend follow-up in 1 year for complete exam blood work and continuation of regular colon screening for colon cancer mammography for breast cancer    Recommend

## 2020-08-26 NOTE — ASSESSMENT & PLAN NOTE
Top normal glucose reading noted with a reading of 100 today recommend reduction of concentrated sugars and carbohydrates along with weight reduction

## 2020-09-24 ENCOUNTER — OFFICE VISIT (OUTPATIENT)
Dept: DERMATOLOGY | Facility: CLINIC | Age: 60
End: 2020-09-24
Payer: COMMERCIAL

## 2020-09-24 VITALS — TEMPERATURE: 98.3 F | BODY MASS INDEX: 35.38 KG/M2 | WEIGHT: 199.7 LBS

## 2020-09-24 DIAGNOSIS — B37.3 CANDIDIASIS OF VAGINA: Primary | ICD-10-CM

## 2020-09-24 PROCEDURE — 87205 SMEAR GRAM STAIN: CPT | Performed by: DERMATOLOGY

## 2020-09-24 PROCEDURE — 87070 CULTURE OTHR SPECIMN AEROBIC: CPT | Performed by: DERMATOLOGY

## 2020-09-24 PROCEDURE — 87186 SC STD MICRODIL/AGAR DIL: CPT | Performed by: DERMATOLOGY

## 2020-09-24 PROCEDURE — 87077 CULTURE AEROBIC IDENTIFY: CPT | Performed by: DERMATOLOGY

## 2020-09-24 PROCEDURE — 87102 FUNGUS ISOLATION CULTURE: CPT | Performed by: DERMATOLOGY

## 2020-09-24 PROCEDURE — 99213 OFFICE O/P EST LOW 20 MIN: CPT | Performed by: DERMATOLOGY

## 2020-09-24 PROCEDURE — 87147 CULTURE TYPE IMMUNOLOGIC: CPT | Performed by: DERMATOLOGY

## 2020-09-24 RX ORDER — FLUCONAZOLE 150 MG/1
TABLET ORAL
Qty: 3 TABLET | Refills: 0 | Status: SHIPPED | OUTPATIENT
Start: 2020-09-24 | End: 2020-09-27

## 2020-09-24 NOTE — PATIENT INSTRUCTIONS
Assessment and Plan:  Based on a thorough discussion of this condition and the management approach to it (including a comprehensive discussion of the known risks, side effects and potential benefits of treatment), the patient (family) agrees to implement the following specific plan:   Can stop Clobetasol temporarily    Start taking oral Diflucan as instructed   ·       Can continue to apply petroleum jelly   ·       We will contact you with culture results    Follow up as needed; Contact office with any concerns    Lichen sclerosus is a chronic inflammatory skin disorder that most often affects the genital and perianal areas  It is more common in women before puberty or after menopause  It is rare in males, but when present is called "balanitis xerotica obliterans "  Lichen sclerosus can start at any age, although it is most often diagnosed in women over 48  Pre-pubertal children can also be affected   Lichen sclerosis is 10 times more common in women than in men   15% of patients know of a family member with lichen sclerosis   It may follow or co-exist with another skin condition, most often lichen simplex, psoriasis, erosive lichen planus, vitiligo or morphea   People with lichen sclerosus often have a personal or family history of other autoimmune disease such as thyroid disease (about 20% of patients), pernicious anaemia, or alopecia areata  What causes lichen sclerosus? The cause of lichen sclerosus is not fully understood, and may include genetic, hormonal, irritant, traumatic and infectious components  Lichen sclerosis is often classified as an autoimmune disease  Autoimmune disorders arise when the body's natural defenses against foreign or invading organisms (e g , antibodies) begin to attack healthy tissue for unknown reasons   Antibodies to other unknown proteins may account for other cases, explaining differing presentations of lichen sclerosis and response to treatment     However, these antibodies could be epigenetic, ie, the results of disease rather than the cause of disease   Some scientists believe that a genetic predisposition to lichen sclerosus exists  A genetic predisposition means that a person may carry a gene for a disease but it may not be expressed unless something in the environment triggers the disease  Other researchers believe that hormonal, irritant and/or infectious factors (or a combination of these) cause this skin condition  Cases where lichen sclerosus appears on skin after it has been damaged (from an injury or trauma) have been reported  What are the clinical features of lichen sclerosus? Lichen sclerosus usually affects the external genitalia (vulva or penis) and/or the area around the anus (perianal region)  Sometimes, it is accompanied by intense (intractable) itching, burning, and pain  If the disease is severe, even minor abrasions or chaffing can cause bleeding, tearing, and blistering  The scarring that results from untreated lichen sclerosis produces problems with urination, defecation, and intercourse  The presence of thin, easily irritated, and torn skin affects physical activity and clothing choice  For children with lichen sclerosus affecting the perianal region, constipation may be among the first signs of the presence of the disease  Lichen sclerosus is much more likely to affect males that have not been circumcised than males that have been  Rarely, lichen sclerosus can also affect other areas of the skin such as the breast, wrists, shoulder, neck, back, thigh, and the mouth  Skin tissue often becomes thin, shiny, wrinkled and parchment-like  Fissures, cracks, and purplish patches (ecchymoses) appear frequently  The earliest areas of lichen sclerosis exhibit a porcelain white appearing center surrounded by redness  This grows together to form larger areas of lichen sclerosus   The areas that are prone to rubbing and friction can develop blisters or bruising  The long term result of lichen sclerosus are areas of shiny, thin skin that has a tendency to be dry, crack, or bleed  This also produces loss of the normal parts of the external genitals, narrowing of the opening of the urethra/vagina/anus, and phimosis (inability to retract the foreskin) in men  The presence of non-healing ulcers or raised ulcerated areas in the external genitalia of women raises suspicion for the development of squamous cell carcinoma  In males, lichen sclerosus most commonly affects the foreskin of the penis, although it may affect other areas of the body  The opening at the end of the foreskin may become narrow and scarred  Discoloration and skin changes may also occur  Symptoms also include itching, soreness, and painful erections  In men, involvement in the perineal area is rare  In some rare cases, skin lesions may also develop in the mouth  The lesions consist of bluish-white flat irregular patchy areas on the inside of the cheeks and/or palate  The tongue, lips, and gums may also be involved  How do we diagnose lichen sclerosus atrophicus? Lichen sclerosus is diagnosed by looking at the skin affected  All those affected require a thorough clinical evaluation, identification of characteristic physical features, and a detailed patient history  A biopsy may be needed to confirm diagnosis  Biopsies may also be performed if squamous cell carcinoma is suspected  How do we treat lichen sclerosis? General measures for genital lichen sclerosis   Wash gently once or twice daily   Use a non-soap cleanser, if any   Try to avoid tight clothing, rubbing and scratching   Activities such as riding a bicycle or horse may aggravate symptoms   If incontinent, seek medical advice and treatment   Apply emollients to relieve dryness and itching, and as a barrier to protect sensitive skin in genital and anal areas from contact with urine and feces      Topical steroids are the main treatment for lichen sclerosus  An ultrapotent topical steroid is often prescribed, eg clobetasol propionate 0 05%  A potent topical steroid, eg mometasone furoate 0 1% ointment, may also be used in mild disease or when symptoms are controlled   An ointment base is less likely than cream to sting or to cause contact dermatitis   A thin smear should be precisely applied to the white plaques and rubbed in gently   Most patients will be told to apply the steroid ointment once a day  After one to three months (depending on the severity of the disease), the ointment can be used less often   Topical steroid may need to be continued once or twice a week to control symptoms or to prevent lichen sclerosis recurring   Itch often settles within a few days but it may take weeks to months for the skin to return to normal (if at all)   One 30-g tube of topical steroid should last 3 to 6 months or longer  It is most important to follow instructions carefully and to attend follow-up appointments regularly  Other topical treatments used in patients with lichen sclerosis include:   Intravaginal estrogen cream or pessaries in postmenopausal women  These reduce symptoms due to atrophic vulvovaginitis (dry, thin, fissured and sensitive vulval and vaginal tissues due to hormonal deficiency)   Topical calcineurin inhibitors tacrolimus ointment and pimecrolimus cream instead of or in addition to topical steroids  They tend to cause burning discomfort (at least for the first few days)  Early concern that these medications may have the potential to accelerate cancer growth in the presence of oncogenic human papilloma virus (the cause of genital warts) appears unfounded   Topical retinoid (eg tretinoin cream) is not well tolerated on genital skin but may be applied to other sites affected by lichen sclerosis  It reduces scaling and dryness      Oral medications: when severe, acute, and not responding to topical therapy, systemic treatment may rarely be prescribed  Options include:   Intralesional or systemic corticosteroids    Oral retinoids: acitretin, isotretinoin   Methotrexate   Ciclosporin    Surgery: is essential for high-grade squamous intraepithelial lesions or cancer  In males, circumcision is effective in lichen sclerosus affecting prepuce and glans of the penis  It is best done early if initial topical steroids have not controlled symptoms and signs  If the urethra is stenosed or scarred, reconstructive surgery may be necessary  Unfortunately, lichen sclerosus sometimes closes up the vaginal opening again after surgery has initially appeared successful  It can be repeated  Other reported treatments for lichen sclerosus are considered experimental at this time     CO2 laser ablation of hyperkeratotic plaques   Phototherapy   Photodynamic therapy   Fat injections   Stem cell and platelet-rich plasma injections

## 2020-09-24 NOTE — PROGRESS NOTES
[facility] Follow Up Note    Patient Name: Smitha Alcala  Encounter Date: 09/24/2020    Today's Chief Concerns:  Karen Mccartney Concern #1:  Follow up       Current Medications:    Current Outpatient Medications:     Azelaic Acid 15 % cream, APPLY TO ROSACEA ONCE EVERY DAY, Disp: 50 g, Rfl: 3    calcium polycarbophil (FIBER-CAPS) 625 mg tablet, Take by mouth, Disp: , Rfl:     clobetasol (TEMOVATE) 0 05 % ointment, Apply topically 2 (two) times a day Apply topically twice a day for 2 weeks, then off 2 weeks, then repeat, Disp: 60 g, Rfl: 2    econazole nitrate 1 % cream, , Disp: , Rfl:     ergocalciferol (VITAMIN D2) 50,000 units, Take 5,000 Units by mouth daily , Disp: , Rfl:     estradiol (CLIMARA) 0 05 mg/24 hr, Place 1 patch on the skin once a week, Disp: 12 patch, Rfl: 3    tacrolimus (PROTOPIC) 0 1 % ointment, Apply topically 2 (two) times a day Apply twice daily to active red skin areas of lichen sclerosus, Disp: 100 g, Rfl: 1    CONSTITUTIONAL:   There were no vitals filed for this visit  Specific Alerts:    Have you been seen by a [facility]atologist in the last 3 years? YES    Are you pregnant or planning to become pregnant? N/A    Are you currently or planning to be nursing or breast feeding? N/A    Allergies   Allergen Reactions    Ceclor [Cefaclor] Rash    Ciprofloxacin Hcl Rash    Erythromycin Rash     Powder/Suspension    Flagyl [Metronidazole] Rash    Penicillins Rash    Bactrim [Sulfamethoxazole-Trimethoprim] Rash       May we call your Preferred Phone number to discuss your specific medical information? YES    May we leave a detailed message that includes your specific medical information? YES    Have you traveled outside of the [facility] in the past 3 months? No      Review of Systems:  Have you recently had or currently have any of the following?     · Fever or chills: No  · Night Sweats: No  · Headaches: No  · Weight Gain: No  · Weight Loss: No  · Blurry Vision: No  · Nausea: No  · Vomiting: No  · Diarrhea: No  · Blood in Stool: No  · Abdominal Pain: No  · Itchy Skin: YES  · Painful Joints: No  · Swollen Joints: No  · Muscle Pain: No  · Irregular Mole: No  · Sun Burn: No  · Dry Skin: No  · Skin Color Changes: No  · Scar or Keloid: No  · Cold Sores/Fever Blisters: No  · Bacterial Infections/MRSA: No  · Anxiety: No  · Depression: No  · Suicidal or Homicidal Thoughts: No      PSYCH: Normal mood and affect  EYES: Normal conjunctiva  ENT: Normal lips and oral mucosa  CARDIOVASCULAR: No edema  RESPIRATORY: Normal respirations  HEME/LYMPH/IMMUNO:  No regional lymphadenopathy except as noted below in ASSESSMENT AND PLAN BY DIAGNOSIS    FULL ORGAN SYSTEM SKIN EXAM (SKIN)  Hair, Scalp, Ears, Face Normal except as noted below in Assessment   Neck, Cervical Chain Nodes Normal except as noted below in Assessment   Right Arm/Hand/Fingers Normal except as noted below in Assessment   Left Arm/Hand/Fingers Normal except as noted below in Assessment   Chest/Breasts/Axillae Viewed areas Normal except as noted below in Assessment   Abdomen, Umbilicus Normal except as noted below in Assessment   Back/Spine Normal except as noted below in Assessment   Groin/Genitalia/Buttocks Viewed areas Normal except as noted below in Assessment   Right Leg, Foot, Toes Normal except as noted below in Assessment   Left Leg, Foot, Toes Normal except as noted below in Assessment       FOLLOW UP LICHEN SCLEROSUS ATROPHICUS    Physical Exam:   Anatomic Location Affected:  Clitoris; labia majora   Morphological Description:  Not active  Circuscribed scars torso  And perineum  Mild scaring near clitoris and labia minor no gross erythema but  Mild white duscharge,  KOH negative   Pertinent Positives:   Pertinent Negatives: Additional History of Present Condition:  Patient is present for a follow up; patient reports after completing clobetasol develops an erythematous flare   Report tacrolimus did not help her  Reports rash inner thigh area  Notes redness with clobetasol application marked  Somewhat better post stopping treatment  KOH- negative  Assessment and Plan:  Based on a thorough discussion of this condition and the management approach to it (including a comprehensive discussion of the known risks, side effects and potential benefits of treatment), the patient (family) agrees to implement the following specific plan:   I discussed that Lichen sclerosus which was histologically confirmed actually looks improve both vaginal and skin  But increase redness with application is noted  Secondary candiasis is considered or bacterial infection  Due to topical steroid  Contact allergy to clobetasol is unusual but can  Not be ruled out  Since LS&A looks quiet danay hold treatment  with Clobetasol  Will treat for possible secondary yeast and observe   Start taking oral Diflucan as instructed    Can continue to apply petroleum jelly    Follow up discussed  Culture performed for bacteria with candida screen  Lichen sclerosus is a chronic inflammatory skin disorder that most often affects the genital and perianal areas  It is more common in women before puberty or after menopause  It is rare in males, but when present is called "balanitis xerotica obliterans "  Lichen sclerosus can start at any age, although it is most often diagnosed in women over 48  Pre-pubertal children can also be affected   Lichen sclerosis is 10 times more common in women than in men   15% of patients know of a family member with lichen sclerosis   It may follow or co-exist with another skin condition, most often lichen simplex, psoriasis, erosive lichen planus, vitiligo or morphea   People with lichen sclerosus often have a personal or family history of other autoimmune disease such as thyroid disease (about 20% of patients), pernicious anaemia, or alopecia areata      What causes lichen sclerosus? The cause of lichen sclerosus is not fully understood, and may include genetic, hormonal, irritant, traumatic and infectious components  Lichen sclerosis is often classified as an autoimmune disease  Autoimmune disorders arise when the body's natural defenses against foreign or invading organisms (e g , antibodies) begin to attack healthy tissue for unknown reasons   Antibodies to other unknown proteins may account for other cases, explaining differing presentations of lichen sclerosis and response to treatment   However, these antibodies could be epigenetic, ie, the results of disease rather than the cause of disease   Some scientists believe that a genetic predisposition to lichen sclerosus exists  A genetic predisposition means that a person may carry a gene for a disease but it may not be expressed unless something in the environment triggers the disease  Other researchers believe that hormonal, irritant and/or infectious factors (or a combination of these) cause this skin condition  Cases where lichen sclerosus appears on skin after it has been damaged (from an injury or trauma) have been reported  What are the clinical features of lichen sclerosus? Lichen sclerosus usually affects the external genitalia (vulva or penis) and/or the area around the anus (perianal region)  Sometimes, it is accompanied by intense (intractable) itching, burning, and pain  If the disease is severe, even minor abrasions or chaffing can cause bleeding, tearing, and blistering  The scarring that results from untreated lichen sclerosis produces problems with urination, defecation, and intercourse  The presence of thin, easily irritated, and torn skin affects physical activity and clothing choice  For children with lichen sclerosus affecting the perianal region, constipation may be among the first signs of the presence of the disease   Lichen sclerosus is much more likely to affect males that have not been circumcised than males that have been  Rarely, lichen sclerosus can also affect other areas of the skin such as the breast, wrists, shoulder, neck, back, thigh, and the mouth  Skin tissue often becomes thin, shiny, wrinkled and parchment-like  Fissures, cracks, and purplish patches (ecchymoses) appear frequently  The earliest areas of lichen sclerosis exhibit a porcelain white appearing center surrounded by redness  This grows together to form larger areas of lichen sclerosus  The areas that are prone to rubbing and friction can develop blisters or bruising  The long term result of lichen sclerosus are areas of shiny, thin skin that has a tendency to be dry, crack, or bleed  This also produces loss of the normal parts of the external genitals, narrowing of the opening of the urethra/vagina/anus, and phimosis (inability to retract the foreskin) in men  The presence of non-healing ulcers or raised ulcerated areas in the external genitalia of women raises suspicion for the development of squamous cell carcinoma  In males, lichen sclerosus most commonly affects the foreskin of the penis, although it may affect other areas of the body  The opening at the end of the foreskin may become narrow and scarred  Discoloration and skin changes may also occur  Symptoms also include itching, soreness, and painful erections  In men, involvement in the perineal area is rare  In some rare cases, skin lesions may also develop in the mouth  The lesions consist of bluish-white flat irregular patchy areas on the inside of the cheeks and/or palate  The tongue, lips, and gums may also be involved  How do we diagnose lichen sclerosus atrophicus? Lichen sclerosus is diagnosed by looking at the skin affected  All those affected require a thorough clinical evaluation, identification of characteristic physical features, and a detailed patient history  A biopsy may be needed to confirm diagnosis   Biopsies may also be performed if squamous cell carcinoma is suspected  How do we treat lichen sclerosis? General measures for genital lichen sclerosis   Wash gently once or twice daily   Use a non-soap cleanser, if any   Try to avoid tight clothing, rubbing and scratching   Activities such as riding a bicycle or horse may aggravate symptoms   If incontinent, seek medical advice and treatment   Apply emollients to relieve dryness and itching, and as a barrier to protect sensitive skin in genital and anal areas from contact with urine and feces  Topical steroids are the main treatment for lichen sclerosus  An ultrapotent topical steroid is often prescribed, eg clobetasol propionate 0 05%  A potent topical steroid, eg mometasone furoate 0 1% ointment, may also be used in mild disease or when symptoms are controlled   An ointment base is less likely than cream to sting or to cause contact dermatitis   A thin smear should be precisely applied to the white plaques and rubbed in gently   Most patients will be told to apply the steroid ointment once a day  After one to three months (depending on the severity of the disease), the ointment can be used less often   Topical steroid may need to be continued once or twice a week to control symptoms or to prevent lichen sclerosis recurring   Itch often settles within a few days but it may take weeks to months for the skin to return to normal (if at all)   One 30-g tube of topical steroid should last 3 to 6 months or longer  It is most important to follow instructions carefully and to attend follow-up appointments regularly  Other topical treatments used in patients with lichen sclerosis include:   Intravaginal estrogen cream or pessaries in postmenopausal women  These reduce symptoms due to atrophic vulvovaginitis (dry, thin, fissured and sensitive vulval and vaginal tissues due to hormonal deficiency)     Topical calcineurin inhibitors tacrolimus ointment and pimecrolimus cream instead of or in addition to topical steroids  They tend to cause burning discomfort (at least for the first few days)  Early concern that these medications may have the potential to accelerate cancer growth in the presence of oncogenic human papilloma virus (the cause of genital warts) appears unfounded   Topical retinoid (eg tretinoin cream) is not well tolerated on genital skin but may be applied to other sites affected by lichen sclerosis  It reduces scaling and dryness  Oral medications: when severe, acute, and not responding to topical therapy, systemic treatment may rarely be prescribed  Options include:   Intralesional or systemic corticosteroids    Oral retinoids: acitretin, isotretinoin   Methotrexate   Ciclosporin    Surgery: is essential for high-grade squamous intraepithelial lesions or cancer  In males, circumcision is effective in lichen sclerosus affecting prepuce and glans of the penis  It is best done early if initial topical steroids have not controlled symptoms and signs  If the urethra is stenosed or scarred, reconstructive surgery may be necessary  Unfortunately, lichen sclerosus sometimes closes up the vaginal opening again after surgery has initially appeared successful  It can be repeated  Other reported treatments for lichen sclerosus are considered experimental at this time     CO2 laser ablation of hyperkeratotic plaques   Phototherapy   Photodynamic therapy   Fat injections   Stem cell and platelet-rich plasma injections    Scribe Attestation    I,:   Darcie Sheikh MA am acting as a scribe while in the presence of the attending physician :        I,:   Emmie Sawant MD personally performed the services described in this documentation    as scribed in my presence :

## 2020-09-28 ENCOUNTER — TELEPHONE (OUTPATIENT)
Dept: DERMATOLOGY | Facility: CLINIC | Age: 60
End: 2020-09-28

## 2020-09-28 LAB
BACTERIA WND AEROBE CULT: ABNORMAL
GRAM STN SPEC: ABNORMAL
GRAM STN SPEC: ABNORMAL

## 2020-09-28 NOTE — TELEPHONE ENCOUNTER
I discussed cultures with Dar Davis  I note that this is normal danny  One minor area irritation noted  I now suspect that this is all  The LSA vince be secondarily irritated by propylene glycol in steroid  In future consider propylene glycol free steroid

## 2020-09-28 NOTE — RESULT ENCOUNTER NOTE
I discussed cultures with Amor Villasenor  I note that this is normal danny  One minor area irritation noted  I now suspect that this is all  The LSA may be secondarily irritated by propylene glycol in steroid    In future consider propylene glycol free steroid ointment

## 2020-10-05 DIAGNOSIS — L90.0 LICHEN SCLEROSUS ET ATROPHICUS: Primary | ICD-10-CM

## 2020-10-05 RX ORDER — FLUOCINOLONE ACETONIDE 0.25 MG/G
OINTMENT TOPICAL DAILY
Qty: 30 G | Refills: 0 | Status: SHIPPED | OUTPATIENT
Start: 2020-10-05 | End: 2020-11-02 | Stop reason: SDUPTHER

## 2020-10-05 RX ORDER — CLOBETASOL PROPIONATE 0.5 MG/G
OINTMENT TOPICAL 2 TIMES DAILY
Qty: 60 G | Refills: 2 | Status: SHIPPED | OUTPATIENT
Start: 2020-10-05 | End: 2020-10-05 | Stop reason: ALTCHOICE

## 2020-10-13 ENCOUNTER — OFFICE VISIT (OUTPATIENT)
Dept: DERMATOLOGY | Facility: CLINIC | Age: 60
End: 2020-10-13
Payer: COMMERCIAL

## 2020-10-13 VITALS — TEMPERATURE: 97.3 F

## 2020-10-13 DIAGNOSIS — N90.4 LICHEN SCLEROSUS OF FEMALE GENITALIA: Primary | ICD-10-CM

## 2020-10-13 PROCEDURE — 99213 OFFICE O/P EST LOW 20 MIN: CPT | Performed by: DERMATOLOGY

## 2020-10-21 ENCOUNTER — OFFICE VISIT (OUTPATIENT)
Dept: OBGYN CLINIC | Facility: CLINIC | Age: 60
End: 2020-10-21
Payer: COMMERCIAL

## 2020-10-21 VITALS
BODY MASS INDEX: 35.78 KG/M2 | WEIGHT: 202 LBS | DIASTOLIC BLOOD PRESSURE: 74 MMHG | TEMPERATURE: 98.2 F | SYSTOLIC BLOOD PRESSURE: 112 MMHG

## 2020-10-21 DIAGNOSIS — N81.6 RECTOCELE: Primary | ICD-10-CM

## 2020-10-21 PROBLEM — Z00.00: Status: RESOLVED | Noted: 2018-07-06 | Resolved: 2020-10-21

## 2020-10-21 PROCEDURE — 99212 OFFICE O/P EST SF 10 MIN: CPT | Performed by: OBSTETRICS & GYNECOLOGY

## 2020-10-26 LAB — FUNGUS SPEC CULT: NORMAL

## 2020-11-02 DIAGNOSIS — L90.0 LICHEN SCLEROSUS ET ATROPHICUS: ICD-10-CM

## 2020-11-02 RX ORDER — FLUOCINOLONE ACETONIDE 0.25 MG/G
OINTMENT TOPICAL DAILY
Qty: 60 G | Refills: 1 | Status: SHIPPED | OUTPATIENT
Start: 2020-11-02 | End: 2022-02-11

## 2020-11-02 NOTE — TELEPHONE ENCOUNTER
Return call to pateint who asked f a message can be sent to Dr Marisol Meneses asking for a refill on her RX for Fluocinolone ointment  She asked for it to be sent to the [facility] on  [address]  She was told a message would be sent to him

## 2020-11-19 ENCOUNTER — OFFICE VISIT (OUTPATIENT)
Dept: DERMATOLOGY | Facility: CLINIC | Age: 60
End: 2020-11-19
Payer: COMMERCIAL

## 2020-11-19 VITALS — WEIGHT: 203 LBS | BODY MASS INDEX: 35.96 KG/M2

## 2020-11-19 DIAGNOSIS — L90.0 LICHEN SCLEROSUS: Primary | ICD-10-CM

## 2020-11-19 PROCEDURE — 99213 OFFICE O/P EST LOW 20 MIN: CPT | Performed by: DERMATOLOGY

## 2020-11-19 RX ORDER — CLOBETASOL PROPIONATE 0.5 MG/G
OINTMENT TOPICAL 2 TIMES DAILY
Qty: 30 G | Refills: 0 | Status: CANCELLED | OUTPATIENT
Start: 2020-11-19

## 2020-11-27 ENCOUNTER — LAB (OUTPATIENT)
Dept: LAB | Facility: HOSPITAL | Age: 60
End: 2020-11-27
Attending: DERMATOLOGY
Payer: COMMERCIAL

## 2020-11-27 DIAGNOSIS — L90.0 LICHEN SCLEROSUS: ICD-10-CM

## 2020-11-27 PROCEDURE — 36415 COLL VENOUS BLD VENIPUNCTURE: CPT

## 2020-11-27 PROCEDURE — 86038 ANTINUCLEAR ANTIBODIES: CPT

## 2020-11-30 LAB — RYE IGE QN: NEGATIVE

## 2020-12-17 ENCOUNTER — IMMUNIZATIONS (OUTPATIENT)
Dept: FAMILY MEDICINE CLINIC | Facility: HOSPITAL | Age: 60
End: 2020-12-17
Payer: COMMERCIAL

## 2020-12-17 ENCOUNTER — TELEPHONE (OUTPATIENT)
Dept: DERMATOLOGY | Facility: CLINIC | Age: 60
End: 2020-12-17

## 2020-12-17 DIAGNOSIS — L90.0 LICHEN SCLEROSUS ET ATROPHICUS: Primary | ICD-10-CM

## 2020-12-17 DIAGNOSIS — Z23 ENCOUNTER FOR IMMUNIZATION: ICD-10-CM

## 2020-12-17 PROCEDURE — 0001A SARS-COV-2 / COVID-19 MRNA VACCINE (PFIZER-BIONTECH) 30 MCG: CPT

## 2020-12-17 PROCEDURE — 91300 SARS-COV-2 / COVID-19 MRNA VACCINE (PFIZER-BIONTECH) 30 MCG: CPT

## 2020-12-17 RX ORDER — CLOBETASOL PROPIONATE 0.5 MG/G
OINTMENT TOPICAL 2 TIMES DAILY
Qty: 30 G | Refills: 0 | Status: SHIPPED | OUTPATIENT
Start: 2020-12-17 | End: 2020-12-31 | Stop reason: SDUPTHER

## 2020-12-31 ENCOUNTER — OFFICE VISIT (OUTPATIENT)
Dept: DERMATOLOGY | Facility: CLINIC | Age: 60
End: 2020-12-31
Payer: COMMERCIAL

## 2020-12-31 VITALS — HEIGHT: 64 IN | BODY MASS INDEX: 34.43 KG/M2 | TEMPERATURE: 96.3 F | WEIGHT: 201.7 LBS

## 2020-12-31 DIAGNOSIS — L90.0 LICHEN SCLEROSUS ET ATROPHICUS: ICD-10-CM

## 2020-12-31 PROCEDURE — 99213 OFFICE O/P EST LOW 20 MIN: CPT | Performed by: DERMATOLOGY

## 2020-12-31 RX ORDER — CLOBETASOL PROPIONATE 0.5 MG/G
OINTMENT TOPICAL
Qty: 60 G | Refills: 3 | Status: SHIPPED | OUTPATIENT
Start: 2020-12-31 | End: 2022-02-11

## 2020-12-31 RX ORDER — TACROLIMUS 1 MG/G
OINTMENT TOPICAL
Qty: 100 G | Refills: 1 | Status: SHIPPED | OUTPATIENT
Start: 2020-12-31 | End: 2021-01-25 | Stop reason: SDUPTHER

## 2021-01-06 ENCOUNTER — IMMUNIZATIONS (OUTPATIENT)
Dept: FAMILY MEDICINE CLINIC | Facility: HOSPITAL | Age: 61
End: 2021-01-06

## 2021-01-06 DIAGNOSIS — Z23 ENCOUNTER FOR IMMUNIZATION: ICD-10-CM

## 2021-01-06 PROCEDURE — 91300 SARS-COV-2 / COVID-19 MRNA VACCINE (PFIZER-BIONTECH) 30 MCG: CPT

## 2021-01-06 PROCEDURE — 0002A SARS-COV-2 / COVID-19 MRNA VACCINE (PFIZER-BIONTECH) 30 MCG: CPT

## 2021-01-21 ENCOUNTER — TELEPHONE (OUTPATIENT)
Dept: OBGYN CLINIC | Facility: CLINIC | Age: 61
End: 2021-01-21

## 2021-01-21 DIAGNOSIS — E89.41 SYMPTOMATIC POSTSURGICAL MENOPAUSE: Primary | ICD-10-CM

## 2021-01-21 RX ORDER — ESTRADIOL 0.05 MG/D
1 FILM, EXTENDED RELEASE TRANSDERMAL 2 TIMES WEEKLY
Qty: 24 PATCH | Refills: 3 | Status: SHIPPED | OUTPATIENT
Start: 2021-01-21 | End: 2022-01-25 | Stop reason: SDUPTHER

## 2021-01-21 NOTE — TELEPHONE ENCOUNTER
Rx sent to [facility] for Matt Cornelius  She should just change to this patch when she should have put on her new Climara patch  She will change this patch twice per week  She can call me with any questions

## 2021-01-21 NOTE — TELEPHONE ENCOUNTER
Pt having a reaction to the climara patch, pharmacy is asking if pt can be switched to the Vivelle dot but the application vince be different

## 2021-01-24 ENCOUNTER — PATIENT MESSAGE (OUTPATIENT)
Dept: DERMATOLOGY | Facility: CLINIC | Age: 61
End: 2021-01-24

## 2021-01-24 DIAGNOSIS — L90.0 LICHEN SCLEROSUS ET ATROPHICUS: Primary | ICD-10-CM

## 2021-01-25 RX ORDER — TACROLIMUS 1 MG/G
OINTMENT TOPICAL 2 TIMES DAILY
Qty: 100 G | Refills: 6 | Status: SHIPPED | OUTPATIENT
Start: 2021-01-25 | End: 2021-11-17

## 2021-02-08 DIAGNOSIS — Z20.822 EXPOSURE TO COVID-19 VIRUS: Primary | ICD-10-CM

## 2021-02-08 LAB — SARS-COV-2 RNA RESP QL NAA+PROBE: NEGATIVE

## 2021-02-08 PROCEDURE — 87635 SARS-COV-2 COVID-19 AMP PRB: CPT | Performed by: INTERNAL MEDICINE

## 2021-06-16 ENCOUNTER — OFFICE VISIT (OUTPATIENT)
Dept: BARIATRICS | Facility: CLINIC | Age: 61
End: 2021-06-16
Payer: COMMERCIAL

## 2021-06-16 VITALS
WEIGHT: 202.2 LBS | BODY MASS INDEX: 34.52 KG/M2 | HEIGHT: 64 IN | RESPIRATION RATE: 16 BRPM | DIASTOLIC BLOOD PRESSURE: 64 MMHG | HEART RATE: 82 BPM | SYSTOLIC BLOOD PRESSURE: 108 MMHG | TEMPERATURE: 97.8 F

## 2021-06-16 DIAGNOSIS — R73.01 IFG (IMPAIRED FASTING GLUCOSE): ICD-10-CM

## 2021-06-16 DIAGNOSIS — R63.5 ABNORMAL WEIGHT GAIN: Primary | ICD-10-CM

## 2021-06-16 PROCEDURE — 99242 OFF/OP CONSLTJ NEW/EST SF 20: CPT | Performed by: PHYSICIAN ASSISTANT

## 2021-06-16 NOTE — PROGRESS NOTES
Assessment/Plan:    Severe obesity ([facility] [address] )  -Discussed options of HealthyCORE-Intensive Lifestyle Intervention Program, Very Low Calorie Diet-VLCD and Conservative Program and the role of weight loss medications   -Initial weight loss goal of 5-10% weight loss for improved health  -Sister with thyroid CA - papillary with follicular variant  -Denies glaucoma or seizure  -Patient denies personal and family history of MEN2 tumors and medullary thyroid/thyroid carcinoma  Patient denies personal history of pancreatitis   -Discussed role of AOM and indications  She is interested in this, particularly, Wegovy> Contrave  Has PACs so would avoid phentermine/Qsymia  Mechanism, dosing, and SE profile reviewed of each  She will reach out if she is interested in starting either     -Screening labs: ordered  -Patient is interested in pursuing Conservative Program  -Nutrition recs provided/questions answered    Follow up in approximately 6 weeks with Non-Surgical Physician/Advanced Practitioner  Goals:  Food log (ie ) www myfitnesspal com,sparkpeople  com,loseit com,calorieking  com,etc  baritastic  No sugary beverages  At least 64oz of water daily  Increase physical activity by 10 minutes daily  Gradually increase physical activity to a goal of 5 days per week for 30 minutes of MODERATE intensity PLUS 2 days per week of FULL BODY resistance training  5-10 servings of fruits and vegetables per day and 25-35 grams of dietary fiber per day, gradually increasing  900-1200 calories   If choosing starch pick whole grain/complex carbs and keep 1/2 cup: brown rice, quinoa, whole wheat pasta, sweet potato, chickpea noodles, red lentil noodles    VISIT [facility]  COM  -IF NAUSEA/VOMITING DEVELOP STOP MEDICATION FOR A FEW DAYS AND DECREASE TO PREVIOUSLY TOLERATED DOSE  STAY HYDRATED    -IF YOU STOP THE MEDICATION FOR 3 DAYS RESTART AT THE LOWEST DOSE (0 6 mg) AND FOLLOW ABOVE DOSING INSTRUCTIONS  -IF YOU DEVELOP SEVERE ABDOMINAL PAIN WHICH MAY RADIATE TO THE BACK, SOMETIMES ASSOCIATED WITH FEVER, AND VOMITING, STOP MEDICATION AND SEEK URGENT CARE AS THIS MAY BE A SIGN OF PANCREATITIS  Diagnoses and all orders for this visit:    Abnormal weight gain  -     Lipid panel; Future  -     Comprehensive metabolic panel; Future  -     Hemoglobin A1C; Future  -     Insulin, fasting; Future  -     TSH, 3rd generation with Free T4 reflex; Future    IFG (impaired fasting glucose)  -     Lipid panel; Future  -     Comprehensive metabolic panel; Future  -     Hemoglobin A1C; Future  -     Insulin, fasting; Future  -     TSH, 3rd generation with Free T4 reflex; Future    Body mass index 35 0-35 9, adult        Subjective:   Chief Complaint   Patient presents with    Consult     MWM consult     Patient ID: Ginna Kovacs  is a 56 y o  female with excess weight/obesity here to pursue weight management    Past Medical History:   Diagnosis Date    Left knee injury     grade 1 injury of medial collateral ligament of left knee, sprain  of MCL joint    Menometrorrhagia     Metrorrhagia     Mitral valve prolapse syndrome     leaflet syndrome    Osteopenia     Rosacea     Rosacea     Thyroid nodule     Nontoxic single     HPI:  Obesity/Excess Weight:  Severity: Moderate  Onset:  Since adolescence, pregnancy, quitting smoking    Modifiers: Diet and Exercise  Contributing factors: see above  Associated symptoms: would feel better with weight loss    Goals: 150  Hydration: 64 oz of water, 2 cups of coffee with half and half  Alcohol: Friday's, sometimes Saturday- one  Exercise: treadmill 3x/week 30 minutes  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 [employer]  STOPBAN/8    The following portions of the patient's history were reviewed and updated as appropriate: allergies, current medications, past family history, past medical history, past social history, past surgical history and problem list     Review of Systems   Constitutional: Negative for chills and fever  HENT: Negative for sore throat  Cardiovascular: Positive for palpitations (PACs)  Negative for chest pain  Gastrointestinal: Negative for constipation and diarrhea  Neurological: Negative for headaches  Psychiatric/Behavioral: Negative  Objective:    /64 (BP Location: Left arm, Patient Position: Sitting, Cuff Size: Adult)   Pulse 82   Temp 97 8 °F (36 6 °C) (Tympanic)   Resp 16   Ht 5' 3 5" (1 613 m)   Wt 91 7 kg (202 lb 3 2 oz)   LMP 10/03/2016 (Approximate)   BMI 35 26 kg/m²     Physical Exam  Vitals and nursing note reviewed  Constitutional   General appearance: Abnormal   well developed and obese  Pulmonary   Respiratory effort: No increased work of breathing or signs of respiratory distress  Abdomen   Abdomen: Abnormal   The abdomen was obese    Musculoskeletal   Gait and station: Normal     Psychiatric   Orientation to person, place and time: Normal     Affect: appropriate

## 2021-06-16 NOTE — PATIENT INSTRUCTIONS
Goals: Food log (ie ) www myfitnesspal com,sparkpeople  com,loseit com,calorieCalastone  com,etc  baritastic  No sugary beverages  At least 64oz of water daily  Increase physical activity by 10 minutes daily  Gradually increase physical activity to a goal of 5 days per week for 30 minutes of MODERATE intensity PLUS 2 days per week of FULL BODY resistance training  5-10 servings of fruits and vegetables per day and 25-35 grams of dietary fiber per day, gradually increasing  900-1200 calories   If choosing starch pick whole grain/complex carbs and keep 1/2 cup: brown rice, quinoa, whole wheat pasta, sweet potato, chickpea noodles, red lentil noodles  Contrave, Wegovy  Recommend checking lab coverage before having labs drawn    [address]  COM  -IF NAUSEA/VOMITING DEVELOP STOP MEDICATION FOR A FEW DAYS AND DECREASE TO PREVIOUSLY TOLERATED DOSE  STAY HYDRATED  -IF YOU STOP THE MEDICATION FOR 3 DAYS RESTART AT THE LOWEST DOSE (0 6 mg) AND FOLLOW ABOVE DOSING INSTRUCTIONS  -IF YOU DEVELOP SEVERE ABDOMINAL PAIN WHICH MAY RADIATE TO THE BACK, SOMETIMES ASSOCIATED WITH FEVER, AND VOMITING, STOP MEDICATION AND SEEK URGENT CARE AS THIS MAY BE A SIGN OF PANCREATITIS

## 2021-06-17 ENCOUNTER — APPOINTMENT (OUTPATIENT)
Dept: LAB | Facility: HOSPITAL | Age: 61
End: 2021-06-17
Attending: PHYSICIAN ASSISTANT
Payer: COMMERCIAL

## 2021-06-17 DIAGNOSIS — R73.01 IFG (IMPAIRED FASTING GLUCOSE): ICD-10-CM

## 2021-06-17 DIAGNOSIS — R63.5 ABNORMAL WEIGHT GAIN: ICD-10-CM

## 2021-06-17 PROBLEM — E66.01 SEVERE OBESITY (HCC): Status: ACTIVE | Noted: 2018-07-06

## 2021-06-17 LAB
ALBUMIN SERPL BCP-MCNC: 3.9 G/DL (ref 3.5–5)
ALP SERPL-CCNC: 70 U/L (ref 46–116)
ALT SERPL W P-5'-P-CCNC: 21 U/L (ref 12–78)
ANION GAP SERPL CALCULATED.3IONS-SCNC: 6 MMOL/L (ref 4–13)
AST SERPL W P-5'-P-CCNC: 11 U/L (ref 5–45)
BILIRUB SERPL-MCNC: 0.39 MG/DL (ref 0.2–1)
BUN SERPL-MCNC: 17 MG/DL (ref 5–25)
CALCIUM SERPL-MCNC: 9.1 MG/DL (ref 8.3–10.1)
CHLORIDE SERPL-SCNC: 111 MMOL/L (ref 100–108)
CHOLEST SERPL-MCNC: 210 MG/DL (ref 50–200)
CO2 SERPL-SCNC: 25 MMOL/L (ref 21–32)
CREAT SERPL-MCNC: 0.76 MG/DL (ref 0.6–1.3)
EST. AVERAGE GLUCOSE BLD GHB EST-MCNC: 105 MG/DL
GFR SERPL CREATININE-BSD FRML MDRD: 84 ML/MIN/1.73SQ M
GLUCOSE P FAST SERPL-MCNC: 91 MG/DL (ref 65–99)
HBA1C MFR BLD: 5.3 %
HDLC SERPL-MCNC: 56 MG/DL
INSULIN SERPL-ACNC: 8.8 MU/L (ref 3–25)
LDLC SERPL CALC-MCNC: 126 MG/DL (ref 0–100)
NONHDLC SERPL-MCNC: 154 MG/DL
POTASSIUM SERPL-SCNC: 4.5 MMOL/L (ref 3.5–5.3)
PROT SERPL-MCNC: 7.5 G/DL (ref 6.4–8.2)
SODIUM SERPL-SCNC: 142 MMOL/L (ref 136–145)
TRIGL SERPL-MCNC: 140 MG/DL
TSH SERPL DL<=0.05 MIU/L-ACNC: 1.74 UIU/ML (ref 0.36–3.74)

## 2021-06-17 PROCEDURE — 80061 LIPID PANEL: CPT

## 2021-06-17 PROCEDURE — 84443 ASSAY THYROID STIM HORMONE: CPT

## 2021-06-17 PROCEDURE — 83036 HEMOGLOBIN GLYCOSYLATED A1C: CPT

## 2021-06-17 PROCEDURE — 36415 COLL VENOUS BLD VENIPUNCTURE: CPT

## 2021-06-17 PROCEDURE — 80053 COMPREHEN METABOLIC PANEL: CPT

## 2021-06-17 PROCEDURE — 83525 ASSAY OF INSULIN: CPT

## 2021-06-17 NOTE — ASSESSMENT & PLAN NOTE
-Discussed options of HealthyCORE-Intensive Lifestyle Intervention Program, Very Low Calorie Diet-VLCD and Conservative Program and the role of weight loss medications   -Initial weight loss goal of 5-10% weight loss for improved health  -Sister with thyroid CA - papillary with follicular variant  -Denies glaucoma or seizure  -Patient denies personal and family history of MEN2 tumors and medullary thyroid/thyroid carcinoma  Patient denies personal history of pancreatitis   -Discussed role of AOM and indications  She is interested in this, particularly, Wegovy> Contrave  Has PACs so would avoid phentermine/Qsymia  Mechanism, dosing, and SE profile reviewed of each    She will reach out if she is interested in starting either     -Screening labs: ordered  -Patient is interested in pursuing Conservative Program  -Nutrition recs provided/questions answered

## 2021-06-29 ENCOUNTER — OFFICE VISIT (OUTPATIENT)
Dept: BARIATRICS | Facility: CLINIC | Age: 61
End: 2021-06-29

## 2021-06-29 DIAGNOSIS — R63.5 ABNORMAL WEIGHT GAIN: ICD-10-CM

## 2021-06-29 PROCEDURE — RECHECK: Performed by: DIETITIAN, REGISTERED

## 2021-06-29 PROCEDURE — WMDI30: Performed by: DIETITIAN, REGISTERED

## 2021-06-29 NOTE — PROGRESS NOTES
Name was verified by patient stating name? Yes   verified by patient stating? Yes  Verified the patient is alone? Yes  Offered patient a live visit but are now consenting to this telephone visit? Yes  Verified with the patient this visit will go towards their pre paid charges? Yes       Weight Management Medical Nutrition Assessment  Jesus Dobson presented for a meal planning session  Today's weight is 202   Per dietary recall patient consumes excess calories from larger portions at meals and snacking at work late afternoon  Patient stated she is interested in using meal replacements  Developed and reviewed a low calorie meal plan using partial meal replacements  Patient seen by Medical Provider in past 6 months:    Requested to schedule appointment with Medical Provider:       Anthropometric Measurements  Start Weight (#): 202#  Current Weight (#): n/a  TBW % Change from start weight:n/a  Ideal Body Weight (#):117 5#  Goal Weight (#):ST#    Weight Loss History  Previous weight loss attempts: self-directed    Food and Nutrition Related History  Calories / Protein/ Carbs   Breakfast:[place] butter/ banana or eggs and toast  Snack:  Lunch:[employer] - Salad or pizza   Snack:snacking - sweets/ salty   Dinner:lean protein/ veggies/ carb - larger portions  Snack:carbs- sweet India Stager       Beverages: water  Volume of beverage intake: 60oz    Weekends: same - dine our  Cravings: carbs  Trouble area of day:late afternoon     Frequency of Eating out: cafetereia   Food restrictions:  Cooking: self   Food Shopping: self    Physical Activity Intake  Activity:walk  Frequency:infrequent  Physical limitations/barriers to exercise: none    Estimated Needs  Energy    Bear Wilber Energy Needs:  BMR : 1453 calories    1-2# loss weekly sedentary:744-1244 calories               1-2# loss weekly lightly active:990-1490 calories  Maintenance calories for sedentary activity level: 1744 calories  Protein:  64-80gm   (1 2-1 5g/kg IBW)  62oz    (35mL/kg IBW)    Nutrition Diagnosis  Patient with obesity grade II related to excessive energy intake as evidenced by BMI 35 2    Nutrition Intervention    Nutrition Prescription  Calories:1200 calories on sedentary days and flex to 1400 calories on walk days  Protein:64-80gm  Fluid:62oz    Meal Plan (Bijan/Pro/Carb)  Breakfast:Shake/fruit  Snack:  Lunch:Shake/fruit  Snack:150  Dinner:300/30/30-45  Snack:150    Nutrition Education:      Calorie controlled menu  Lean protein food choices  Healthy snack options  Food journaling tips      Nutrition Counseling:  Strategies: meal planning, portion sizes, healthy snack choices, hydration, fiber intake, protein intake, exercise, food journal      Monitoring and Evaluation:  Evaluation criteria:  Energy Intake  Meet protein needs  Maintain adequate hydration  Monitor weekly weight  Meal planning/preparation  Food journal   Decreased portions at mealtimes and snacks  Physical activity     Barriers to learning:none  Readiness to change: preparation  Comprehension: action  Expected Compliance: action

## 2021-07-29 ENCOUNTER — HOSPITAL ENCOUNTER (OUTPATIENT)
Dept: RADIOLOGY | Age: 61
Discharge: HOME | End: 2021-07-29
Payer: COMMERCIAL

## 2021-07-29 VITALS — HEIGHT: 64 IN | WEIGHT: 197 LBS | BODY MASS INDEX: 33.63 KG/M2

## 2021-07-29 DIAGNOSIS — Z12.31 ENCOUNTER FOR SCREENING MAMMOGRAM FOR MALIGNANT NEOPLASM OF BREAST: ICD-10-CM

## 2021-07-29 PROCEDURE — 77063 BREAST TOMOSYNTHESIS BI: CPT

## 2021-07-29 PROCEDURE — 77067 SCR MAMMO BI INCL CAD: CPT

## 2021-09-20 ENCOUNTER — IMMUNIZATIONS (OUTPATIENT)
Dept: FAMILY MEDICINE CLINIC | Facility: HOSPITAL | Age: 61
End: 2021-09-20

## 2021-09-20 DIAGNOSIS — Z23 ENCOUNTER FOR IMMUNIZATION: Primary | ICD-10-CM

## 2021-09-20 PROCEDURE — 91300 SARS-COV-2 / COVID-19 MRNA VACCINE (PFIZER-BIONTECH) 30 MCG: CPT

## 2021-09-20 PROCEDURE — 0001A SARS-COV-2 / COVID-19 MRNA VACCINE (PFIZER-BIONTECH) 30 MCG: CPT

## 2021-11-19 ENCOUNTER — TELEPHONE (OUTPATIENT)
Dept: DERMATOLOGY | Facility: CLINIC | Age: 61
End: 2021-11-19

## 2021-11-19 ENCOUNTER — TELEPHONE (OUTPATIENT)
Dept: INTERNAL MEDICINE CLINIC | Facility: CLINIC | Age: 61
End: 2021-11-19

## 2021-11-19 DIAGNOSIS — L90.0 LICHEN SCLEROSUS: ICD-10-CM

## 2021-11-19 DIAGNOSIS — L71.9 ROSACEA: Primary | ICD-10-CM

## 2021-11-19 RX ORDER — FLUOCINOLONE ACETONIDE 0.025 %
KIT TOPICAL 2 TIMES DAILY
Qty: 60 G | Refills: 1 | Status: SHIPPED | OUTPATIENT
Start: 2021-11-19 | End: 2022-03-22 | Stop reason: SDUPTHER

## 2022-01-25 ENCOUNTER — ANNUAL EXAM (OUTPATIENT)
Dept: OBGYN CLINIC | Facility: CLINIC | Age: 62
End: 2022-01-25
Payer: COMMERCIAL

## 2022-01-25 VITALS
BODY MASS INDEX: 36.14 KG/M2 | WEIGHT: 204 LBS | SYSTOLIC BLOOD PRESSURE: 122 MMHG | DIASTOLIC BLOOD PRESSURE: 70 MMHG | HEIGHT: 63 IN

## 2022-01-25 DIAGNOSIS — E89.41 SYMPTOMATIC POSTSURGICAL MENOPAUSE: ICD-10-CM

## 2022-01-25 DIAGNOSIS — Z12.31 ENCOUNTER FOR SCREENING MAMMOGRAM FOR MALIGNANT NEOPLASM OF BREAST: ICD-10-CM

## 2022-01-25 DIAGNOSIS — Z01.419 ENCOUNTER FOR GYNECOLOGICAL EXAMINATION (GENERAL) (ROUTINE) WITHOUT ABNORMAL FINDINGS: Primary | ICD-10-CM

## 2022-01-25 PROCEDURE — S0612 ANNUAL GYNECOLOGICAL EXAMINA: HCPCS | Performed by: OBSTETRICS & GYNECOLOGY

## 2022-01-25 RX ORDER — ESTRADIOL 0.05 MG/D
1 FILM, EXTENDED RELEASE TRANSDERMAL 2 TIMES WEEKLY
Qty: 24 PATCH | Refills: 3 | Status: SHIPPED | OUTPATIENT
Start: 2022-01-27

## 2022-01-25 RX ORDER — FLUOCINOLONE ACETONIDE 0.25 MG/G
CREAM TOPICAL
COMMUNITY
Start: 2021-11-19 | End: 2022-02-11

## 2022-01-25 NOTE — PROGRESS NOTES
Lucila Cisse   1960    CC:  Yearly exam    S:  59 y o  female here for yearly exam  She is postmenopausal and has had no vaginal bleeding  She denies vaginal discharge, itching, odor or dryness  She has had difficulty with flares of her LS, which predominantly bothers her on her perianal skin  She is essentially using her topical treatments continuously  She has adequate refills from dermatology and a follow up on 3/2022  Discussed the possible contribution of stress  She continues to do well with her HRT and wishes to continue it at this time  She reports considerably better quality of sleep on the medication and notices the change if she forgets to apply a new patch  Sexual activity: She is sexually active without pain, bleeding or dryness  Last Pap: 8/22/2017 - normal; s/p hysterectomy  Last Mammo: 7/29/2021 - BIRAD-1  Last Colonoscopy: 4/13/2015 - 10 years  Last DEXA: 11/9/2018 - normal    We reviewed ASCCP guidelines for Pap testing  Current Outpatient Medications:     azelaic acid (AZELEX) 20 % cream, Apply topically 2 (two) times a day, Disp: 50 g, Rfl: 3    Calcium Citrate (CALCITRATE PO), Take by mouth, Disp: , Rfl:     calcium polycarbophil (FIBER-CAPS) 625 mg tablet, Take by mouth, Disp: , Rfl:     econazole nitrate 1 % cream, , Disp: , Rfl:     ergocalciferol (VITAMIN D2) 50,000 units, Take 5,000 Units by mouth daily , Disp: , Rfl:     estradiol (VIVELLE-DOT) 0 05 MG/24HR, Place 1 patch on the skin 2 (two) times a week, Disp: 24 patch, Rfl: 3    fluocinolone (SYNALAR) 0 025 % ointment, Apply topically daily Apply daily to affected area as needed  , Disp: 60 g, Rfl: 1    Fluocinolone-Emollient (Synalar, Ointment,) 0 025 % KIT, Apply topically 2 (two) times a day, Disp: 60 g, Rfl: 1    Azelaic Acid 15 % cream, APPLY TO ROSACEA ONCE EVERY DAY (Patient not taking: Reported on 1/25/2022), Disp: 50 g, Rfl: 3    clobetasol (TEMOVATE) 0 05 % ointment, Aplyly  To affected are as directed (Patient not taking: Reported on 2022 ), Disp: 60 g, Rfl: 3    fluocinolone (SYNALAR) 0 025 % cream, , Disp: , Rfl:   Social History     Socioeconomic History    Marital status: /Civil Union     Spouse name: Simeon Duque Number of children: 1    Years of education: Not on file    Highest education level: Not on file   Occupational History    Occupation: [employer]   Tobacco Use    Smoking status: Former Smoker     Packs/day: 0 50     Years: 20 00     Pack years: 10 00     Quit date:      Years since quittin 0    Smokeless tobacco: Never Used    Tobacco comment: Quit   Vaping Use    Vaping Use: Never used   Substance and Sexual Activity    Alcohol use: Yes     Alcohol/week: 1 0 standard drink     Types: 1 Glasses of wine per week    Drug use: Never    Sexual activity: Yes     Partners: Male     Birth control/protection: Post-menopausal, Surgical     Comment:    Other Topics Concern    Not on file   Social History Narrative    Who lives in your home:  and self    What type of home do you live in: Single    Age of your home: 40 yrs    How long have you been living there: 15 yrs    Type of heat: Forced hot air    Central AC    Type of fuel: oil    What type of radha is in your bedroom:Carpeting    Do you have the following in or near your home:    Air products: No purifiers or humdifiers    Pests: No    Pets: Dog (sweata)    Are pets allowed in bedroom: Yes    Open fields, wooded areas nearby: Wooded area    Basement: Partial finished, dry, dehumidifier, crawl space    Exposure to second hand smoke: No        Habits:    Caffeine: Coffee; Amount: 2/day, 40 years;  Soda occasionally; hot tea/iced tea occasionally    Chocolate: occasionally            Works full time     Social Determinants of Health     Financial Resource Strain: Not on file   Food Insecurity: Not on file   Transportation Needs: Not on file   Physical Activity: Insufficiently Active    Days of Exercise per Week: 4 days    Minutes of Exercise per Session: 30 min   Stress: Not on file   Social Connections: Not on file   Intimate Partner Violence: Not on file   Housing Stability: Not on file     Family History   Problem Relation Age of Onset    Diabetes Mother     Heart disease Mother     Stroke Mother     Thyroid disease Mother     Diabetes Father     Heart disease Father     Hypertension Family     Uterine cancer Sister     Thyroid cancer Sister     Endometrial cancer Sister 36    Prostate cancer Brother 58    No Known Problems Brother     Allergies Sister     No Known Problems Brother     Lionel syndrome Sister     Neurodegenerative disease Sister     Ovarian cancer Paternal Aunt 79    No Known Problems Daughter     No Known Problems Maternal Grandmother     No Known Problems Maternal Grandfather     No Known Problems Paternal Grandmother     No Known Problems Paternal Grandfather     No Known Problems Maternal Aunt     No Known Problems Maternal Aunt     No Known Problems Maternal Aunt     No Known Problems Maternal Aunt     No Known Problems Maternal Aunt     No Known Problems Paternal Aunt     No Known Problems Paternal Aunt      Past Medical History:   Diagnosis Date    Left knee injury     grade 1 injury of medial collateral ligament of left knee, sprain  of MCL joint    Lichen sclerosus     Menometrorrhagia     Metrorrhagia     Mitral valve prolapse syndrome     leaflet syndrome    Osteopenia     Rosacea     Rosacea     Thyroid nodule     Nontoxic single        Review of Systems    Gastrointestinal: Negative for constipation and diarrhea  Genitourinary: Negative for difficulty urinating, pelvic pain, vaginal bleeding, vaginal discharge, itching or odor  O:  Blood pressure 122/70, height 5' 3" (1 6 m), weight 92 5 kg (204 lb), last menstrual period 10/03/2016, not currently breastfeeding      Patient appears well and is not in distress  Neck is supple without masses  Breasts are symmetrical without mass, tenderness, nipple discharge, skin changes or adenopathy  Abdomen is soft and nontender without masses  External genitals are normal without lesions or rashes  Urethral meatus and urethra are normal  Bladder is normal to palpation  Vagina is normal without discharge or bleeding  Cervix is surgically absent  Uterus is surgically absent  Adnexa are normal, nontender, without palpable mass  A:  Yearly exam      P:   Pap no longer indicated  Mammogram scheduled   Colonoscopy due 2025   DEXA up to date   HRT refill sent    RTO one year for yearly exam or sooner as needed

## 2022-02-09 ENCOUNTER — APPOINTMENT (OUTPATIENT)
Dept: LAB | Facility: HOSPITAL | Age: 62
End: 2022-02-09
Attending: INTERNAL MEDICINE
Payer: COMMERCIAL

## 2022-02-09 DIAGNOSIS — R39.9 UTI SYMPTOMS: ICD-10-CM

## 2022-02-09 DIAGNOSIS — Z13.0 SCREENING FOR DEFICIENCY ANEMIA: ICD-10-CM

## 2022-02-09 DIAGNOSIS — E78.1 HYPERTRIGLYCERIDEMIA: ICD-10-CM

## 2022-02-09 DIAGNOSIS — Z12.11 COLON CANCER SCREENING: ICD-10-CM

## 2022-02-09 DIAGNOSIS — Z13.1 SCREENING FOR DIABETES MELLITUS: ICD-10-CM

## 2022-02-09 DIAGNOSIS — E55.9 VITAMIN D DEFICIENCY: ICD-10-CM

## 2022-02-09 LAB
25(OH)D3 SERPL-MCNC: 20.6 NG/ML (ref 30–100)
ALBUMIN SERPL BCP-MCNC: 3.9 G/DL (ref 3.5–5)
ALP SERPL-CCNC: 77 U/L (ref 46–116)
ALT SERPL W P-5'-P-CCNC: 20 U/L (ref 12–78)
ANION GAP SERPL CALCULATED.3IONS-SCNC: 6 MMOL/L (ref 4–13)
AST SERPL W P-5'-P-CCNC: 14 U/L (ref 5–45)
BASOPHILS # BLD AUTO: 0.04 THOUSANDS/ΜL (ref 0–0.1)
BASOPHILS NFR BLD AUTO: 1 % (ref 0–1)
BILIRUB SERPL-MCNC: 0.8 MG/DL (ref 0.2–1)
BILIRUB UR QL STRIP: NEGATIVE
BUN SERPL-MCNC: 20 MG/DL (ref 5–25)
CALCIUM SERPL-MCNC: 8.9 MG/DL (ref 8.3–10.1)
CHLORIDE SERPL-SCNC: 110 MMOL/L (ref 100–108)
CHOLEST SERPL-MCNC: 205 MG/DL
CLARITY UR: CLEAR
CO2 SERPL-SCNC: 24 MMOL/L (ref 21–32)
COLOR UR: YELLOW
CREAT SERPL-MCNC: 0.86 MG/DL (ref 0.6–1.3)
EOSINOPHIL # BLD AUTO: 0.14 THOUSAND/ΜL (ref 0–0.61)
EOSINOPHIL NFR BLD AUTO: 2 % (ref 0–6)
ERYTHROCYTE [DISTWIDTH] IN BLOOD BY AUTOMATED COUNT: 13.6 % (ref 11.6–15.1)
GFR SERPL CREATININE-BSD FRML MDRD: 72 ML/MIN/1.73SQ M
GLUCOSE P FAST SERPL-MCNC: 97 MG/DL (ref 65–99)
GLUCOSE UR STRIP-MCNC: NEGATIVE MG/DL
HCT VFR BLD AUTO: 44.5 % (ref 34.8–46.1)
HDLC SERPL-MCNC: 53 MG/DL
HEMOCCULT STL QL IA: NEGATIVE
HGB BLD-MCNC: 14.8 G/DL (ref 11.5–15.4)
HGB UR QL STRIP.AUTO: NEGATIVE
IMM GRANULOCYTES # BLD AUTO: 0.02 THOUSAND/UL (ref 0–0.2)
IMM GRANULOCYTES NFR BLD AUTO: 0 % (ref 0–2)
KETONES UR STRIP-MCNC: NEGATIVE MG/DL
LDLC SERPL CALC-MCNC: 121 MG/DL (ref 0–100)
LEUKOCYTE ESTERASE UR QL STRIP: NEGATIVE
LYMPHOCYTES # BLD AUTO: 1.65 THOUSANDS/ΜL (ref 0.6–4.47)
LYMPHOCYTES NFR BLD AUTO: 26 % (ref 14–44)
MCH RBC QN AUTO: 30.3 PG (ref 26.8–34.3)
MCHC RBC AUTO-ENTMCNC: 33.3 G/DL (ref 31.4–37.4)
MCV RBC AUTO: 91 FL (ref 82–98)
MONOCYTES # BLD AUTO: 0.5 THOUSAND/ΜL (ref 0.17–1.22)
MONOCYTES NFR BLD AUTO: 8 % (ref 4–12)
NEUTROPHILS # BLD AUTO: 3.92 THOUSANDS/ΜL (ref 1.85–7.62)
NEUTS SEG NFR BLD AUTO: 63 % (ref 43–75)
NITRITE UR QL STRIP: NEGATIVE
NONHDLC SERPL-MCNC: 152 MG/DL
NRBC BLD AUTO-RTO: 0 /100 WBCS
PH UR STRIP.AUTO: 6 [PH]
PLATELET # BLD AUTO: 330 THOUSANDS/UL (ref 149–390)
PMV BLD AUTO: 11.2 FL (ref 8.9–12.7)
POTASSIUM SERPL-SCNC: 3.7 MMOL/L (ref 3.5–5.3)
PROT SERPL-MCNC: 7.5 G/DL (ref 6.4–8.2)
PROT UR STRIP-MCNC: NEGATIVE MG/DL
RBC # BLD AUTO: 4.88 MILLION/UL (ref 3.81–5.12)
SODIUM SERPL-SCNC: 140 MMOL/L (ref 136–145)
SP GR UR STRIP.AUTO: 1.02 (ref 1–1.03)
TRIGL SERPL-MCNC: 153 MG/DL
UROBILINOGEN UR QL STRIP.AUTO: 0.2 E.U./DL
WBC # BLD AUTO: 6.27 THOUSAND/UL (ref 4.31–10.16)

## 2022-02-09 PROCEDURE — 80053 COMPREHEN METABOLIC PANEL: CPT

## 2022-02-09 PROCEDURE — G0328 FECAL BLOOD SCRN IMMUNOASSAY: HCPCS

## 2022-02-09 PROCEDURE — 82306 VITAMIN D 25 HYDROXY: CPT

## 2022-02-09 PROCEDURE — 36415 COLL VENOUS BLD VENIPUNCTURE: CPT

## 2022-02-09 PROCEDURE — 80061 LIPID PANEL: CPT

## 2022-02-09 PROCEDURE — 85025 COMPLETE CBC W/AUTO DIFF WBC: CPT

## 2022-02-09 PROCEDURE — 81003 URINALYSIS AUTO W/O SCOPE: CPT

## 2022-02-11 ENCOUNTER — OFFICE VISIT (OUTPATIENT)
Dept: INTERNAL MEDICINE CLINIC | Facility: CLINIC | Age: 62
End: 2022-02-11
Payer: COMMERCIAL

## 2022-02-11 VITALS
HEIGHT: 63 IN | DIASTOLIC BLOOD PRESSURE: 78 MMHG | HEART RATE: 76 BPM | WEIGHT: 201 LBS | TEMPERATURE: 97.3 F | OXYGEN SATURATION: 97 % | SYSTOLIC BLOOD PRESSURE: 130 MMHG | BODY MASS INDEX: 35.61 KG/M2

## 2022-02-11 DIAGNOSIS — L28.0 LICHEN OF SKIN: ICD-10-CM

## 2022-02-11 DIAGNOSIS — M19.90 ARTHRITIS: ICD-10-CM

## 2022-02-11 DIAGNOSIS — E78.5 HYPERLIPIDEMIA, UNSPECIFIED HYPERLIPIDEMIA TYPE: Primary | ICD-10-CM

## 2022-02-11 DIAGNOSIS — E74.39 GLUCOSE INTOLERANCE: ICD-10-CM

## 2022-02-11 DIAGNOSIS — Z13.6 SCREENING FOR HEART DISEASE: ICD-10-CM

## 2022-02-11 DIAGNOSIS — E66.9 OBESITY (BMI 30-39.9): ICD-10-CM

## 2022-02-11 DIAGNOSIS — E55.9 VITAMIN D DEFICIENCY: ICD-10-CM

## 2022-02-11 DIAGNOSIS — E78.1 HYPERTRIGLYCERIDEMIA: ICD-10-CM

## 2022-02-11 PROBLEM — E66.01 SEVERE OBESITY (HCC): Status: RESOLVED | Noted: 2018-07-06 | Resolved: 2022-02-11

## 2022-02-11 PROCEDURE — 93000 ELECTROCARDIOGRAM COMPLETE: CPT | Performed by: INTERNAL MEDICINE

## 2022-02-11 PROCEDURE — 99396 PREV VISIT EST AGE 40-64: CPT | Performed by: INTERNAL MEDICINE

## 2022-02-11 RX ORDER — ERGOCALCIFEROL 1.25 MG/1
50000 CAPSULE ORAL WEEKLY
Qty: 12 CAPSULE | Refills: 0 | Status: SHIPPED | OUTPATIENT
Start: 2022-02-11

## 2022-02-11 NOTE — ASSESSMENT & PLAN NOTE
Current body weight places the patient at a obesity category with a body mass index of 35 61    We discussed the impact of body weight on cholesterol and triglycerides today hopefully with regular exercise and improved diet patient will be able to reduce her weight over the next year

## 2022-02-11 NOTE — PROGRESS NOTES
Assessment/Plan:    Lichen of skin  Lichen of the skin reviewed with the patient this is been evaluated by Dermatology at [facility] recommend continued care under their guidance    Hyperlipidemia  I have reviewed with the patient today her most recent lipid profile which shows an elevated total cholesterol and LDL value  Reviewed the options of possible initiation of statin therapy  Present time patient prefers to try to maintain control over her cholesterol with lifestyle management  I provided her with patient education materials today to adjust her diet I have encouraged her to maintain regular exercise and work on weight reduction  Our plan is for follow-up blood work in 6 months with re-evaluation of her cholesterol values at that time    Glucose intolerance  Previous mild glucose elevation has not returned  The last 2 blood sugar tests are under 100 recommend maintaining healthy balance diet regular exercise and weight reduction with annual surveillance    Obesity (BMI 30-39  9)  Current body weight places the patient at a obesity category with a body mass index of 35 61  We discussed the impact of body weight on cholesterol and triglycerides today hopefully with regular exercise and improved diet patient will be able to reduce her weight over the next year    Vitamin D deficiency  Vitamin-D level reviewed with the patient it does show a deficiency  I recommended a 27506 unit weekly dose for 12 doses followed by 5000 unit maintenance dose  A follow-up vitamin-D level will be obtained in 6 months    Hypertriglyceridemia  Mild elevation in triglycerides reviewed with the patient today  Recommend efforts to reduce concentrated sugars and excessive carbohydrates  She was provided with patient education material and encouraged to embark on a exercise program and weight reduction    We will re-evaluate triglycerides in 6 months    Arthritis  Intermittent arthritis of the hands patient utilizes occasional dosing of Advil or similar product for symptomatic relief  Diagnoses and all orders for this visit:    Vitamin D deficiency  -     ergocalciferol (VITAMIN D2) 50,000 units; Take 1 capsule (50,000 Units total) by mouth once a week  -     Vitamin D 25 hydroxy; Future    Screening for heart disease  -     POCT ECG    Hyperlipidemia, unspecified hyperlipidemia type  -     Lipid panel; Future        Subjective:      Patient ID: Luis Hernandez is a 59 y o  female  This is an annual complete physical examination and review of blood work for this 69-year-old female patient  She presents today up-to-date with her mammography and gyn checkup as well as colonoscopy  She is up-to-date on COVID-19 vaccination as well as flu vaccination  The following portions of the patient's history were reviewed and updated as appropriate:   She  has a past medical history of Left knee injury, Lichen sclerosus, Menometrorrhagia, Metrorrhagia, Mitral valve prolapse syndrome, Osteopenia, Rosacea, Rosacea, and Thyroid nodule  She   Patient Active Problem List    Diagnosis Date Noted    Lichen of skin     Obesity (BMI 30-39 9) 2022    Vitamin D deficiency 2022    Hypertriglyceridemia 2022    Arthritis 2022    Hyperlipidemia 2018    Glucose intolerance 2018     She  has a past surgical history that includes  section, low transverse; Colonoscopy; Fracture surgery (Right); pr hysteroscopy,w/endo bx (N/A, 10/18/2016); Polypectomy (N/A, 10/18/2016); pr laparoscopy w tot hysterectuterus <=250 gram  w tube/ovary (N/A, 2018); CYSTOSCOPY (N/A, 2018); Dilation and curettage of uterus; Hysterectomy (); and Oophorectomy (Bilateral, 2018)  Her family history includes Allergies in her sister; Diabetes in her father and mother; Endometrial cancer (age of onset: 36) in her sister; Heart disease in her father and mother; Hypertension in her family;  Neurodegenerative disease in her sister; No Known Problems in her brother, brother, daughter, maternal aunt, maternal aunt, maternal aunt, maternal aunt, maternal aunt, maternal grandfather, maternal grandmother, paternal aunt, paternal aunt, paternal grandfather, and paternal grandmother; Ovarian cancer (age of onset: 79) in her paternal aunt; Prostate cancer (age of onset: 58) in her brother; Stroke in her mother; Thyroid cancer in her sister; Thyroid disease in her mother; Uterine cancer in her sister; Lionel syndrome in her sister  She  reports that she quit smoking about 22 years ago  She has a 10 00 pack-year smoking history  She has never used smokeless tobacco  She reports current alcohol use of about 1 0 standard drink of alcohol per week  She reports that she does not use drugs  Current Outpatient Medications   Medication Sig Dispense Refill    azelaic acid (AZELEX) 20 % cream Apply topically 2 (two) times a day 50 g 3    Calcium Citrate (CALCITRATE PO) Take by mouth      calcium polycarbophil (FIBER-CAPS) 625 mg tablet Take by mouth      econazole nitrate 1 % cream       ergocalciferol (VITAMIN D2) 50,000 units Take 5,000 Units by mouth daily       estradiol (VIVELLE-DOT) 0 05 MG/24HR Place 1 patch on the skin 2 (two) times a week 24 patch 3    Fluocinolone-Emollient (Synalar, Ointment,) 0 025 % KIT Apply topically 2 (two) times a day 60 g 1    ergocalciferol (VITAMIN D2) 50,000 units Take 1 capsule (50,000 Units total) by mouth once a week 12 capsule 0     No current facility-administered medications for this visit       Review of Systems   Musculoskeletal: Positive for arthralgias  Occasional arthritis of the hands   All other systems reviewed and are negative  Objective:      /78   Pulse 76   Temp (!) 97 3 °F (36 3 °C)   Ht 5' 3" (1 6 m)   Wt 91 2 kg (201 lb)   LMP 10/03/2016 (Approximate)   SpO2 97%   BMI 35 61 kg/m²          Physical Exam  Vitals reviewed     Constitutional: General: She is not in acute distress  Appearance: Normal appearance  She is well-developed  She is not ill-appearing  HENT:      Head: Normocephalic  Right Ear: Hearing, tympanic membrane, ear canal and external ear normal       Left Ear: Hearing, tympanic membrane, ear canal and external ear normal       Nose: Nose normal  No mucosal edema  Mouth/Throat:      Mouth: Mucous membranes are moist       Pharynx: Oropharynx is clear  Uvula midline  Eyes:      General: Lids are normal  No scleral icterus  Right eye: No discharge  Left eye: No discharge  Extraocular Movements: Extraocular movements intact  Conjunctiva/sclera: Conjunctivae normal       Pupils: Pupils are equal, round, and reactive to light  Neck:      Thyroid: No thyromegaly  Vascular: No carotid bruit or JVD  Cardiovascular:      Rate and Rhythm: Normal rate and regular rhythm  Heart sounds: Normal heart sounds  No murmur heard  Pulmonary:      Effort: Pulmonary effort is normal  No respiratory distress  Breath sounds: Normal breath sounds  No wheezing, rhonchi or rales  Abdominal:      General: Bowel sounds are normal  There is no distension  Palpations: Abdomen is soft  There is no mass  Tenderness: There is no abdominal tenderness  There is no right CVA tenderness, left CVA tenderness or guarding  Musculoskeletal:         General: Normal range of motion  Cervical back: Normal range of motion and neck supple  No rigidity or tenderness  Right lower leg: No edema  Left lower leg: No edema  Lymphadenopathy:      Cervical: No cervical adenopathy  Skin:     General: Skin is warm and dry  Coloration: Skin is not jaundiced or pale  Neurological:      General: No focal deficit present  Mental Status: She is alert and oriented to person, place, and time  Mental status is at baseline        Deep Tendon Reflexes: Reflexes abnormal       Comments: Diminishing deep tendon reflexes   Psychiatric:         Mood and Affect: Mood normal          Speech: Speech normal          Behavior: Behavior normal  Behavior is cooperative  Thought Content:  Thought content normal          Judgment: Judgment normal

## 2022-02-11 NOTE — ASSESSMENT & PLAN NOTE
Vitamin-D level reviewed with the patient it does show a deficiency  I recommended a 75302 unit weekly dose for 12 doses followed by 5000 unit maintenance dose    A follow-up vitamin-D level will be obtained in 6 months

## 2022-02-11 NOTE — ASSESSMENT & PLAN NOTE
Lichen of the skin reviewed with the patient this is been evaluated by Dermatology at [facility] recommend continued care under their guidance

## 2022-02-11 NOTE — ASSESSMENT & PLAN NOTE
Intermittent arthritis of the hands patient utilizes occasional dosing of Advil or similar product for symptomatic relief

## 2022-02-11 NOTE — ASSESSMENT & PLAN NOTE
Mild elevation in triglycerides reviewed with the patient today  Recommend efforts to reduce concentrated sugars and excessive carbohydrates  She was provided with patient education material and encouraged to embark on a exercise program and weight reduction    We will re-evaluate triglycerides in 6 months

## 2022-02-11 NOTE — ASSESSMENT & PLAN NOTE
I have reviewed with the patient today her most recent lipid profile which shows an elevated total cholesterol and LDL value  Reviewed the options of possible initiation of statin therapy  Present time patient prefers to try to maintain control over her cholesterol with lifestyle management  I provided her with patient education materials today to adjust her diet I have encouraged her to maintain regular exercise and work on weight reduction    Our plan is for follow-up blood work in 6 months with re-evaluation of her cholesterol values at that time

## 2022-02-11 NOTE — ASSESSMENT & PLAN NOTE
Previous mild glucose elevation has not returned    The last 2 blood sugar tests are under 100 recommend maintaining healthy balance diet regular exercise and weight reduction with annual surveillance

## 2022-03-22 ENCOUNTER — OFFICE VISIT (OUTPATIENT)
Dept: DERMATOLOGY | Facility: CLINIC | Age: 62
End: 2022-03-22
Payer: COMMERCIAL

## 2022-03-22 VITALS — WEIGHT: 197 LBS | BODY MASS INDEX: 34.91 KG/M2 | TEMPERATURE: 98.7 F | HEIGHT: 63 IN

## 2022-03-22 DIAGNOSIS — L71.9 ROSACEA: ICD-10-CM

## 2022-03-22 DIAGNOSIS — L90.0 LICHEN SCLEROSUS: ICD-10-CM

## 2022-03-22 PROCEDURE — 99214 OFFICE O/P EST MOD 30 MIN: CPT | Performed by: DERMATOLOGY

## 2022-03-22 RX ORDER — FLUOCINOLONE ACETONIDE 0.025 %
KIT TOPICAL 2 TIMES DAILY
Qty: 60 G | Refills: 1 | Status: SHIPPED | OUTPATIENT
Start: 2022-03-22 | End: 2023-03-22

## 2022-03-22 NOTE — PROGRESS NOTES
[facility] Follow Up Note    Patient Name: Moses Rausch  Encounter Date: 3/22/2022    Today's Chief Concerns:  [facility] Concern #1:  Follow up LS&A    Current Medications:    Current Outpatient Medications:     azelaic acid (AZELEX) 20 % cream, Apply topically 2 (two) times a day, Disp: 50 g, Rfl: 3    Calcium Citrate (CALCITRATE PO), Take by mouth, Disp: , Rfl:     econazole nitrate 1 % cream, , Disp: , Rfl:     ergocalciferol (VITAMIN D2) 50,000 units, Take 5,000 Units by mouth daily , Disp: , Rfl:     ergocalciferol (VITAMIN D2) 50,000 units, Take 1 capsule (50,000 Units total) by mouth once a week, Disp: 12 capsule, Rfl: 0    estradiol (VIVELLE-DOT) 0 05 MG/24HR, Place 1 patch on the skin 2 (two) times a week, Disp: 24 patch, Rfl: 3    Fluocinolone-Emollient (Synalar, Ointment,) 0 025 % KIT, Apply topically 2 (two) times a day, Disp: 60 g, Rfl: 1    calcium polycarbophil (FIBER-CAPS) 625 mg tablet, Take by mouth, Disp: , Rfl:     CONSTITUTIONAL:   Vitals:    03/22/22 1332   Temp: 98 7 °F (37 1 °C)   TempSrc: Temporal   Weight: 89 4 kg (197 lb)   Height: 5' 3" (1 6 m)         Specific Alerts:    Have you been seen by a [facility] in the last 3 years? YES    Are you pregnant or planning to become pregnant? No    Are you currently or planning to be nursing or breast feeding? No    Allergies   Allergen Reactions    Ciprofloxacin Hcl Rash    Erythromycin Rash     Powder/Suspension    Flagyl [Metronidazole] Rash    Bactrim [Sulfamethoxazole-Trimethoprim] Rash    Cat Hair Extract Allergic Rhinitis    Dust Mite Extract Allergic Rhinitis       May we call your Preferred Phone number to discuss your specific medical information? YES    May we leave a detailed message that includes your specific medical information? YES    Have you traveled outside of the [facility] in the past 3 months? No    Do you currently have a pacemaker or defibrillator?  No    Do you have any artificial heart valves, joints, plates, screws, rods, stents, pins, etc? No   - If Yes, were any placed within the last 2 years? Do you require any medications prior to a surgical procedure? No   - If Yes, for which procedure? - If Yes, what medications to you require? Are you taking any medications that cause you to bleed more easily ("blood thinners") No    Have you ever experienced a rapid heartbeat with epinephrine? No    Have you ever been treated with "gold" (gold sodium thiomalate) therapy? No    [facility] can help with wrinkles, "laugh lines," facial volume loss, "double chin," "love handles," age spots, and more  Are you interested in learning today about some of the skin enhancement procedures that we offer? (If Yes, please provide more detail) No    Review of Systems:  Have you recently had or currently have any of the following? · Fever or chills: No  · Night Sweats: No  · Headaches: No  · Weight Gain: No  · Weight Loss: No  · Blurry Vision: No  · Nausea: No  · Vomiting: No  · Diarrhea: No  · Blood in Stool: No  · Abdominal Pain: No  · Itchy Skin: No  · Painful Joints: No  · Swollen Joints: No  · Muscle Pain: No  · Irregular Mole: No  · Sun Burn: No  · Dry Skin: No  · Skin Color Changes: No  · Scar or Keloid: No  · Cold Sores/Fever Blisters: No  · Bacterial Infections/MRSA: No  · Anxiety: No  · Depression: No  · Suicidal or Homicidal Thoughts: No      PSYCH: Normal mood and affect  EYES: Normal conjunctiva  ENT: Normal lips and oral mucosa  CARDIOVASCULAR: No edema  RESPIRATORY: Normal respirations  HEME/LYMPH/IMMUNO:  No regional lymphadenopathy except as noted below in ASSESSMENT AND PLAN BY DIAGNOSIS    FULL ORGAN SYSTEM SKIN EXAM (SKIN)   Face Normal except as noted below in Assessment                           Groin/Genitalia/Buttocks Viewed areas Normal except as noted below in Assessment   Legs Normal except as noted below in Assessment           1   ROSACEA    Physical Exam:   Anatomic Location Affected: Face   Morphological Description:  Quiet    Additional History of Present Condition:  Present for years    Assessment and Plan:  Based on a thorough discussion of this condition and the management approach to it (including a comprehensive discussion of the known risks, side effects and potential benefits of treatment), the patient (family) agrees to implement the following specific plan:   Continue azelaic acid cream twice a day    Rosacea is a chronic rash affecting the mid-face including the nose, cheeks, chin, forehead, and eyelids  The incidence is usually greatest between the ages of 30-60 years and is more common in people with fair skin  Common characteristics include redness, telangiectasias, papules and pustules over affected areas  Rosacea may look similar to acne, but there is a lack of comedones  Occasionally the eyes may also be involved in ocular rosacea  In advanced disease, enlargement of the sebaceous glands in the nose, termed rhinophyma, may be present  Rosacea results in red spots (papules) and sometimes pustules over the face, but unlike acne there are no blackheads, whiteheads, or cystic nodules  Patients often experience increased facial flushing with prominent blood vessels (erythematotelangiectatic rosacea) and dry, sensitive skin  These symptoms are exacerbated by sun exposure, hot or spicy foods, topical steroids and oil-based facial products  In ocular rosacea, eyelids may be red and sore due to conjunctivitis, keratitis, and episcleritis  If rhinophyma develops due to enlargement of sebaceous glands, the patient may have an enlarged and irregularly shaped nose with prominent pores  In rosacea that is refractory to treatment, patients can develop persistent redness and swelling of the face due to lymphatic obstruction (Morbihan disease)  Distribution around the cheeks may be confused with the malar or butterfly rash of lupus   However, the rash of lupus spares the nasal creases and lacks papules and pustules  If signs of photosensitivity, oral ulcers, arthritis, and kidney dysfunction are present then consider referral to a rheumatologist      There are many potential causes of rosacea including genetic, environmental, vascular, and inflammatory factors  These include, but are not limited to:   Chronic exposure to ultraviolet radiation    Increased immune responses in the form of cathelicidins that promote vessel dilation and infiltration with white blood cells (neutrophils) into the dermis   Increased matrix metalloproteinases such as collagen and elastase that remodel normal tissue may contribute to inflammation of the skin making it thicker and harder   There is some evidence to suggest that increased numbers of demodex mites on patient skin may contribute to rosacea papules     General Treatment Approach    Avoid exacerbating factors such as heat, spicy foods, and alcohol    Use daily SPF30+ sunscreen and other methods of coverage for sun protection   Use water-based make-up    Avoid applying topical steroids to affected areas as they can cause perioral dermatitis and exacerbate rosacea     Topical Treatment Approach   Metronidazole cream or gel by itself or in combination with oral antibiotics for more severe cases   Azelaic acid cream or lotion is effective for mild inflammatory rosacea when applied twice daily to affected areas   Brimonidine gel and oxymetazoline hydrochloride cream can reduce facial redness temporarily    Ivermectin cream can treat papulopustular rosacea by controlling demodex mites and inflammation    Pimecrolimus cream or tacrolimus ointment twice a day for 2-3 months can help reduce inflammation    Oral Treatment Approach   Antibiotics such as doxycycline, minocycline, or erythromycin for 1-3 months   Clonidine and carvedilol can help reduce facial flushing and are generally well tolerated   Common side effects include low blood pressure, gastrointestinal upset, dry eyes, blurred vision and low heart rate   Isotretinoin at low doses can be effective for long term treatment when antibiotics fail  Side effects may make it unsuitable for some patients   NSAIDs such as diclofenac can help reduce discomfort and redness in the skin  Procedural/Surgical Treatment Approach    Vascular lasers or intense pulsed light treatment may be used to treat persistent telangiectasia and papulopustular rosacea   Plastic surgery and carbon dioxide lasers may be used to treat rhinophyma       2  LICHEN SCLEROSUS ATROPHICUS    Physical Exam:   Anatomic Location Affected:  Perineum   Morphological Description:  Mild sclerosis, mild erythema no Stricture   Pertinent Positives:   Pertinent Negatives:No regional lymphadenopathy    Additional History of Present Condition:  Present for years  Patient is using fluocinolone ointment    Assessment and Plan:  Based on a thorough discussion of this condition and the management approach to it (including a comprehensive discussion of the known risks, side effects and potential benefits of treatment), the patient (family) agrees to implement the following specific plan:   Continue fluocinolone ointment daily as needed,     I note that clinically improved  Maintenance of use of steroid ointmnet twice a week recommended to prevent long term sequelae such as stricture  Lichen sclerosus is a chronic inflammatory skin disorder that most often affects the genital and perianal areas  It is more common in women before puberty or after menopause  It is rare in males, but when present is called "balanitis xerotica obliterans "  Lichen sclerosus can start at any age, although it is most often diagnosed in women over 48  Pre-pubertal children can also be affected   Lichen sclerosis is 10 times more common in women than in men     15% of patients know of a family member with lichen sclerosis   It may follow or co-exist with another skin condition, most often lichen simplex, psoriasis, erosive lichen planus, vitiligo or morphea   People with lichen sclerosus often have a personal or family history of other autoimmune disease such as thyroid disease (about 20% of patients), pernicious anaemia, or alopecia areata  What causes lichen sclerosus? The cause of lichen sclerosus is not fully understood, and may include genetic, hormonal, irritant, traumatic and infectious components  Lichen sclerosis is often classified as an autoimmune disease  Autoimmune disorders arise when the body's natural defenses against foreign or invading organisms (e g , antibodies) begin to attack healthy tissue for unknown reasons   Antibodies to other unknown proteins may account for other cases, explaining differing presentations of lichen sclerosis and response to treatment   However, these antibodies could be epigenetic, ie, the results of disease rather than the cause of disease   Some scientists believe that a genetic predisposition to lichen sclerosus exists  A genetic predisposition means that a person may carry a gene for a disease but it may not be expressed unless something in the environment triggers the disease  Other researchers believe that hormonal, irritant and/or infectious factors (or a combination of these) cause this skin condition  Cases where lichen sclerosus appears on skin after it has been damaged (from an injury or trauma) have been reported  What are the clinical features of lichen sclerosus? Lichen sclerosus usually affects the external genitalia (vulva or penis) and/or the area around the anus (perianal region)  Sometimes, it is accompanied by intense (intractable) itching, burning, and pain  If the disease is severe, even minor abrasions or chaffing can cause bleeding, tearing, and blistering   The scarring that results from untreated lichen sclerosis produces problems with urination, defecation, and intercourse  The presence of thin, easily irritated, and torn skin affects physical activity and clothing choice  For children with lichen sclerosus affecting the perianal region, constipation may be among the first signs of the presence of the disease  Lichen sclerosus is much more likely to affect males that have not been circumcised than males that have been  Rarely, lichen sclerosus can also affect other areas of the skin such as the breast, wrists, shoulder, neck, back, thigh, and the mouth  Skin tissue often becomes thin, shiny, wrinkled and parchment-like  Fissures, cracks, and purplish patches (ecchymoses) appear frequently  The earliest areas of lichen sclerosis exhibit a porcelain white appearing center surrounded by redness  This grows together to form larger areas of lichen sclerosus  The areas that are prone to rubbing and friction can develop blisters or bruising  The long term result of lichen sclerosus are areas of shiny, thin skin that has a tendency to be dry, crack, or bleed  This also produces loss of the normal parts of the external genitals, narrowing of the opening of the urethra/vagina/anus, and phimosis (inability to retract the foreskin) in men  The presence of non-healing ulcers or raised ulcerated areas in the external genitalia of women raises suspicion for the development of squamous cell carcinoma  In males, lichen sclerosus most commonly affects the foreskin of the penis, although it may affect other areas of the body  The opening at the end of the foreskin may become narrow and scarred  Discoloration and skin changes may also occur  Symptoms also include itching, soreness, and painful erections  In men, involvement in the perineal area is rare  In some rare cases, skin lesions may also develop in the mouth  The lesions consist of bluish-white flat irregular patchy areas on the inside of the cheeks and/or palate   The tongue, lips, and gums may also be involved  How do we diagnose lichen sclerosus atrophicus? Lichen sclerosus is diagnosed by looking at the skin affected  All those affected require a thorough clinical evaluation, identification of characteristic physical features, and a detailed patient history  A biopsy may be needed to confirm diagnosis  Biopsies may also be performed if squamous cell carcinoma is suspected  How do we treat lichen sclerosis? General measures for genital lichen sclerosis   Wash gently once or twice daily   Use a non-soap cleanser, if any   Try to avoid tight clothing, rubbing and scratching   Activities such as riding a bicycle or horse may aggravate symptoms   If incontinent, seek medical advice and treatment   Apply emollients to relieve dryness and itching, and as a barrier to protect sensitive skin in genital and anal areas from contact with urine and feces  Topical steroids are the main treatment for lichen sclerosus  An ultrapotent topical steroid is often prescribed, eg clobetasol propionate 0 05%  A potent topical steroid, eg mometasone furoate 0 1% ointment, may also be used in mild disease or when symptoms are controlled   An ointment base is less likely than cream to sting or to cause contact dermatitis   A thin smear should be precisely applied to the white plaques and rubbed in gently   Most patients will be told to apply the steroid ointment once a day  After one to three months (depending on the severity of the disease), the ointment can be used less often   Topical steroid may need to be continued once or twice a week to control symptoms or to prevent lichen sclerosis recurring   Itch often settles within a few days but it may take weeks to months for the skin to return to normal (if at all)   One 30-g tube of topical steroid should last 3 to 6 months or longer  It is most important to follow instructions carefully and to attend follow-up appointments regularly  Other topical treatments used in patients with lichen sclerosis include:   Intravaginal estrogen cream or pessaries in postmenopausal women  These reduce symptoms due to atrophic vulvovaginitis (dry, thin, fissured and sensitive vulval and vaginal tissues due to hormonal deficiency)   Topical calcineurin inhibitors tacrolimus ointment and pimecrolimus cream instead of or in addition to topical steroids  They tend to cause burning discomfort (at least for the first few days)  Early concern that these medications may have the potential to accelerate cancer growth in the presence of oncogenic human papilloma virus (the cause of genital warts) appears unfounded   Topical retinoid (eg tretinoin cream) is not well tolerated on genital skin but may be applied to other sites affected by lichen sclerosis  It reduces scaling and dryness  Oral medications: when severe, acute, and not responding to topical therapy, systemic treatment may rarely be prescribed  Options include:   Intralesional or systemic corticosteroids    Oral retinoids: acitretin, isotretinoin   Methotrexate   Ciclosporin    Surgery: is essential for high-grade squamous intraepithelial lesions or cancer  In males, circumcision is effective in lichen sclerosus affecting prepuce and glans of the penis  It is best done early if initial topical steroids have not controlled symptoms and signs  If the urethra is stenosed or scarred, reconstructive surgery may be necessary  Unfortunately, lichen sclerosus sometimes closes up the vaginal opening again after surgery has initially appeared successful  It can be repeated  Other reported treatments for lichen sclerosus are considered experimental at this time     CO2 laser ablation of hyperkeratotic plaques   Phototherapy   Photodynamic therapy   Fat injections   Stem cell and platelet-rich plasma injections    Scribe Attestation    I,:  [place] JOES Paulino am acting as a scribe while in the presence of the attending physician :       I,:  Sarah Blankenship MD personally performed the services described in this documentation    as scribed in my presence :

## 2022-03-22 NOTE — PATIENT INSTRUCTIONS
1  ROSACEA    Physical Exam:   Anatomic Location Affected: Face    Assessment and Plan:  Based on a thorough discussion of this condition and the management approach to it (including a comprehensive discussion of the known risks, side effects and potential benefits of treatment), the patient (family) agrees to implement the following specific plan:   Continue azelaic acid cream twice a day    Rosacea is a chronic rash affecting the mid-face including the nose, cheeks, chin, forehead, and eyelids  The incidence is usually greatest between the ages of 30-60 years and is more common in people with fair skin  Common characteristics include redness, telangiectasias, papules and pustules over affected areas  Rosacea may look similar to acne, but there is a lack of comedones  Occasionally the eyes may also be involved in ocular rosacea  In advanced disease, enlargement of the sebaceous glands in the nose, termed rhinophyma, may be present  Rosacea results in red spots (papules) and sometimes pustules over the face, but unlike acne there are no blackheads, whiteheads, or cystic nodules  Patients often experience increased facial flushing with prominent blood vessels (erythematotelangiectatic rosacea) and dry, sensitive skin  These symptoms are exacerbated by sun exposure, hot or spicy foods, topical steroids and oil-based facial products  In ocular rosacea, eyelids may be red and sore due to conjunctivitis, keratitis, and episcleritis  If rhinophyma develops due to enlargement of sebaceous glands, the patient may have an enlarged and irregularly shaped nose with prominent pores  In rosacea that is refractory to treatment, patients can develop persistent redness and swelling of the face due to lymphatic obstruction (Morbihan disease)  Distribution around the cheeks may be confused with the malar or butterfly rash of lupus  However, the rash of lupus spares the nasal creases and lacks papules and pustules  If signs of photosensitivity, oral ulcers, arthritis, and kidney dysfunction are present then consider referral to a rheumatologist      There are many potential causes of rosacea including genetic, environmental, vascular, and inflammatory factors  These include, but are not limited to:   Chronic exposure to ultraviolet radiation    Increased immune responses in the form of cathelicidins that promote vessel dilation and infiltration with white blood cells (neutrophils) into the dermis   Increased matrix metalloproteinases such as collagen and elastase that remodel normal tissue may contribute to inflammation of the skin making it thicker and harder   There is some evidence to suggest that increased numbers of demodex mites on patient skin may contribute to rosacea papules     General Treatment Approach    Avoid exacerbating factors such as heat, spicy foods, and alcohol    Use daily SPF30+ sunscreen and other methods of coverage for sun protection   Use water-based make-up    Avoid applying topical steroids to affected areas as they can cause perioral dermatitis and exacerbate rosacea     Topical Treatment Approach   Metronidazole cream or gel by itself or in combination with oral antibiotics for more severe cases   Azelaic acid cream or lotion is effective for mild inflammatory rosacea when applied twice daily to affected areas   Brimonidine gel and oxymetazoline hydrochloride cream can reduce facial redness temporarily    Ivermectin cream can treat papulopustular rosacea by controlling demodex mites and inflammation    Pimecrolimus cream or tacrolimus ointment twice a day for 2-3 months can help reduce inflammation    Oral Treatment Approach   Antibiotics such as doxycycline, minocycline, or erythromycin for 1-3 months   Clonidine and carvedilol can help reduce facial flushing and are generally well tolerated   Common side effects include low blood pressure, gastrointestinal upset, dry eyes, blurred vision and low heart rate   Isotretinoin at low doses can be effective for long term treatment when antibiotics fail  Side effects may make it unsuitable for some patients   NSAIDs such as diclofenac can help reduce discomfort and redness in the skin  Procedural/Surgical Treatment Approach    Vascular lasers or intense pulsed light treatment may be used to treat persistent telangiectasia and papulopustular rosacea   Plastic surgery and carbon dioxide lasers may be used to treat rhinophyma               2  LICHEN SCLEROSUS ATROPHICUS    Physical Exam:   Anatomic Location Affected:  Perineum    Assessment and Plan:  Based on a thorough discussion of this condition and the management approach to it (including a comprehensive discussion of the known risks, side effects and potential benefits of treatment), the patient (family) agrees to implement the following specific plan:   Continue fluocinolone ointment daily as needed,      Lichen sclerosus is a chronic inflammatory skin disorder that most often affects the genital and perianal areas  It is more common in women before puberty or after menopause  It is rare in males, but when present is called "balanitis xerotica obliterans "  Lichen sclerosus can start at any age, although it is most often diagnosed in women over 48  Pre-pubertal children can also be affected   Lichen sclerosis is 10 times more common in women than in men   15% of patients know of a family member with lichen sclerosis   It may follow or co-exist with another skin condition, most often lichen simplex, psoriasis, erosive lichen planus, vitiligo or morphea   People with lichen sclerosus often have a personal or family history of other autoimmune disease such as thyroid disease (about 20% of patients), pernicious anaemia, or alopecia areata  What causes lichen sclerosus?   The cause of lichen sclerosus is not fully understood, and may include genetic, hormonal, irritant, traumatic and infectious components  Lichen sclerosis is often classified as an autoimmune disease  Autoimmune disorders arise when the body's natural defenses against foreign or invading organisms (e g , antibodies) begin to attack healthy tissue for unknown reasons   Antibodies to other unknown proteins may account for other cases, explaining differing presentations of lichen sclerosis and response to treatment   However, these antibodies could be epigenetic, ie, the results of disease rather than the cause of disease   Some scientists believe that a genetic predisposition to lichen sclerosus exists  A genetic predisposition means that a person may carry a gene for a disease but it may not be expressed unless something in the environment triggers the disease  Other researchers believe that hormonal, irritant and/or infectious factors (or a combination of these) cause this skin condition  Cases where lichen sclerosus appears on skin after it has been damaged (from an injury or trauma) have been reported  What are the clinical features of lichen sclerosus? Lichen sclerosus usually affects the external genitalia (vulva or penis) and/or the area around the anus (perianal region)  Sometimes, it is accompanied by intense (intractable) itching, burning, and pain  If the disease is severe, even minor abrasions or chaffing can cause bleeding, tearing, and blistering  The scarring that results from untreated lichen sclerosis produces problems with urination, defecation, and intercourse  The presence of thin, easily irritated, and torn skin affects physical activity and clothing choice  For children with lichen sclerosus affecting the perianal region, constipation may be among the first signs of the presence of the disease  Lichen sclerosus is much more likely to affect males that have not been circumcised than males that have been    Rarely, lichen sclerosus can also affect other areas of the skin such as the breast, wrists, shoulder, neck, back, thigh, and the mouth  Skin tissue often becomes thin, shiny, wrinkled and parchment-like  Fissures, cracks, and purplish patches (ecchymoses) appear frequently  The earliest areas of lichen sclerosis exhibit a porcelain white appearing center surrounded by redness  This grows together to form larger areas of lichen sclerosus  The areas that are prone to rubbing and friction can develop blisters or bruising  The long term result of lichen sclerosus are areas of shiny, thin skin that has a tendency to be dry, crack, or bleed  This also produces loss of the normal parts of the external genitals, narrowing of the opening of the urethra/vagina/anus, and phimosis (inability to retract the foreskin) in men  The presence of non-healing ulcers or raised ulcerated areas in the external genitalia of women raises suspicion for the development of squamous cell carcinoma  In males, lichen sclerosus most commonly affects the foreskin of the penis, although it may affect other areas of the body  The opening at the end of the foreskin may become narrow and scarred  Discoloration and skin changes may also occur  Symptoms also include itching, soreness, and painful erections  In men, involvement in the perineal area is rare  In some rare cases, skin lesions may also develop in the mouth  The lesions consist of bluish-white flat irregular patchy areas on the inside of the cheeks and/or palate  The tongue, lips, and gums may also be involved  How do we diagnose lichen sclerosus atrophicus? Lichen sclerosus is diagnosed by looking at the skin affected  All those affected require a thorough clinical evaluation, identification of characteristic physical features, and a detailed patient history  A biopsy may be needed to confirm diagnosis  Biopsies may also be performed if squamous cell carcinoma is suspected  How do we treat lichen sclerosis?   General measures for genital lichen sclerosis   Wash gently once or twice daily   Use a non-soap cleanser, if any   Try to avoid tight clothing, rubbing and scratching   Activities such as riding a bicycle or horse may aggravate symptoms   If incontinent, seek medical advice and treatment   Apply emollients to relieve dryness and itching, and as a barrier to protect sensitive skin in genital and anal areas from contact with urine and feces  Topical steroids are the main treatment for lichen sclerosus  An ultrapotent topical steroid is often prescribed, eg clobetasol propionate 0 05%  A potent topical steroid, eg mometasone furoate 0 1% ointment, may also be used in mild disease or when symptoms are controlled   An ointment base is less likely than cream to sting or to cause contact dermatitis   A thin smear should be precisely applied to the white plaques and rubbed in gently   Most patients will be told to apply the steroid ointment once a day  After one to three months (depending on the severity of the disease), the ointment can be used less often   Topical steroid may need to be continued once or twice a week to control symptoms or to prevent lichen sclerosis recurring   Itch often settles within a few days but it may take weeks to months for the skin to return to normal (if at all)   One 30-g tube of topical steroid should last 3 to 6 months or longer  It is most important to follow instructions carefully and to attend follow-up appointments regularly  Other topical treatments used in patients with lichen sclerosis include:   Intravaginal estrogen cream or pessaries in postmenopausal women  These reduce symptoms due to atrophic vulvovaginitis (dry, thin, fissured and sensitive vulval and vaginal tissues due to hormonal deficiency)   Topical calcineurin inhibitors tacrolimus ointment and pimecrolimus cream instead of or in addition to topical steroids   They tend to cause burning discomfort (at least for the first few days)  Early concern that these medications may have the potential to accelerate cancer growth in the presence of oncogenic human papilloma virus (the cause of genital warts) appears unfounded   Topical retinoid (eg tretinoin cream) is not well tolerated on genital skin but may be applied to other sites affected by lichen sclerosis  It reduces scaling and dryness  Oral medications: when severe, acute, and not responding to topical therapy, systemic treatment may rarely be prescribed  Options include:   Intralesional or systemic corticosteroids    Oral retinoids: acitretin, isotretinoin   Methotrexate   Ciclosporin    Surgery: is essential for high-grade squamous intraepithelial lesions or cancer  In males, circumcision is effective in lichen sclerosus affecting prepuce and glans of the penis  It is best done early if initial topical steroids have not controlled symptoms and signs  If the urethra is stenosed or scarred, reconstructive surgery may be necessary  Unfortunately, lichen sclerosus sometimes closes up the vaginal opening again after surgery has initially appeared successful  It can be repeated  Other reported treatments for lichen sclerosus are considered experimental at this time     CO2 laser ablation of hyperkeratotic plaques   Phototherapy   Photodynamic therapy   Fat injections   Stem cell and platelet-rich plasma injections

## 2022-03-30 DIAGNOSIS — L71.9 ROSACEA: ICD-10-CM

## 2022-03-31 RX ORDER — AZELAIC ACID 0.15 G/G
1 GEL TOPICAL 2 TIMES DAILY
Qty: 50 G | Refills: 2 | Status: CANCELLED | OUTPATIENT
Start: 2022-03-31

## 2022-03-31 NOTE — TELEPHONE ENCOUNTER
Pharmacy called stating that the Azelaic acid 20% cream is more expensive for patient, and is asking if we can send over 15% cream since that is what patient originally used before and is cheaper

## 2022-04-21 DIAGNOSIS — U07.1 COVID-19: Primary | ICD-10-CM

## 2022-04-22 ENCOUNTER — APPOINTMENT (OUTPATIENT)
Dept: LAB | Facility: HOSPITAL | Age: 62
End: 2022-04-22
Attending: INTERNAL MEDICINE
Payer: COMMERCIAL

## 2022-04-22 DIAGNOSIS — Z13.1 SCREENING FOR DIABETES MELLITUS: ICD-10-CM

## 2022-04-22 LAB
EST. AVERAGE GLUCOSE BLD GHB EST-MCNC: 103 MG/DL
HBA1C MFR BLD: 5.2 %
SARS-COV-2 IGG SERPL QL IA: REACTIVE
SARS-COV-2 IGG+IGM SERPL QL IA: REACTIVE

## 2022-04-22 PROCEDURE — 86769 SARS-COV-2 COVID-19 ANTIBODY: CPT | Performed by: INTERNAL MEDICINE

## 2022-04-22 PROCEDURE — 83036 HEMOGLOBIN GLYCOSYLATED A1C: CPT

## 2022-04-22 PROCEDURE — 36415 COLL VENOUS BLD VENIPUNCTURE: CPT | Performed by: INTERNAL MEDICINE

## 2022-05-01 DIAGNOSIS — L28.0 LICHEN OF SKIN: Primary | ICD-10-CM

## 2022-05-02 NOTE — TELEPHONE ENCOUNTER
From: Padmini Brown  To: Office of Sandy Moran MD  Sent: 5/1/2022 9:22 PM EDT  Subject: Medication Renewal Request    Refills have been requested for the following medications: Other - econazole nitrate 1 % cream - non urgent message being sent to request a refill  Thanks  jamia Baker pharmacy: [address] [place] [address] A

## 2022-07-07 ENCOUNTER — TELEPHONE (OUTPATIENT)
Dept: PODIATRY | Facility: CLINIC | Age: 62
End: 2022-07-07

## 2022-08-18 ENCOUNTER — OFFICE VISIT (OUTPATIENT)
Dept: PODIATRY | Facility: CLINIC | Age: 62
End: 2022-08-18
Payer: COMMERCIAL

## 2022-08-18 VITALS
SYSTOLIC BLOOD PRESSURE: 116 MMHG | BODY MASS INDEX: 35.08 KG/M2 | HEART RATE: 71 BPM | HEIGHT: 63 IN | DIASTOLIC BLOOD PRESSURE: 76 MMHG | WEIGHT: 198 LBS

## 2022-08-18 DIAGNOSIS — M20.12 ACQUIRED HALLUX VALGUS OF LEFT FOOT: ICD-10-CM

## 2022-08-18 DIAGNOSIS — M20.11 ACQUIRED HALLUX VALGUS OF RIGHT FOOT: Primary | ICD-10-CM

## 2022-08-18 DIAGNOSIS — M20.42 HAMMER TOE OF LEFT FOOT: ICD-10-CM

## 2022-08-18 DIAGNOSIS — M20.41 ACQUIRED HAMMER TOE OF RIGHT FOOT: ICD-10-CM

## 2022-08-18 PROCEDURE — 99243 OFF/OP CNSLTJ NEW/EST LOW 30: CPT | Performed by: PODIATRIST

## 2022-08-18 NOTE — PROGRESS NOTES
Assessment/Plan:    I personally reviewed x-rays of the right foot  They reveal a hallux valgus deformity with an IM angle of 15°  Hammertoe deformity of the right 2nd toe is also noted  I personally reviewed x-rays of the left foot  They reveal a hallux valgus deformity with an IM angle of 17°  The left 2nd toe is subluxed dorsally and rest on top of the 2nd metatarsal head  Reviewed x-ray results with patient  Surgical intervention is necessary 1st addressing the more painful left foot  Advised patient that ideal surgery is a Lapiplasty  She will also need realignment of the left 2nd toe with pinning  She understands that the left 2nd toe may not hold with this procedure  Amputation of the left 2nd toe is also a treatment option but not desired by patient at this time  Patient was referred to Dr Mariza Gomez for the left foot surgery  No problem-specific Assessment & Plan notes found for this encounter  Diagnoses and all orders for this visit:    Acquired hallux valgus of right foot  -     X-ray foot right 3+ views; Future    Acquired hammer toe of right foot  -     X-ray foot right 3+ views; Future    Acquired hallux valgus of left foot  -     X-ray foot left 3+ views; Future    Hammer toe of left foot  -     X-ray foot left 3+ views; Future          Subjective:      Patient ID: Kacy Metzger is a 59 y o  female  HPI     Patient presents with multiple foot disorders  Patient has had chronic pain in both feet secondary to hallux valgus deformities and hammertoes of the 2nd toe  Patient notes that over the past few months her left 2nd toe has worsened  It rests on top of her left great toe and is irritated by shoes  Patient has a severe bunion deformity left foot as well  The right foot bunion is also severe but the right foot is not near as painful as the left  Patient has had issues with her feet for years  She is trying to pad the 2nd toe to relieve discomfort    The 2nd toe is painful left foot both due to shoe pressure and also at the base of the digit  The following portions of the patient's history were reviewed and updated as appropriate: allergies, current medications, past family history, past medical history, past social history, past surgical history and problem list     Review of Systems   Musculoskeletal: Positive for arthralgias  Skin: Positive for rash  Objective:      /76   Pulse 71   Ht 5' 3" (1 6 m)   Wt 89 8 kg (198 lb)   LMP 10/03/2016 (Approximate)   BMI 35 07 kg/m²          Physical Exam  Constitutional:       Appearance: Normal appearance  Cardiovascular:      Pulses: Normal pulses  Musculoskeletal:         General: Deformity present  Comments: Severe hallux valgus deformity bilateral   Each great toe is deviated laterally  The left great toe rests on to the 3rd toe with the 2nd toe being subluxed  Sharp pain with palpation base of left 2nd toe  Skin:     General: Skin is warm  Neurological:      General: No focal deficit present  Mental Status: She is oriented to person, place, and time

## 2022-09-07 ENCOUNTER — OFFICE VISIT (OUTPATIENT)
Dept: PODIATRY | Facility: CLINIC | Age: 62
End: 2022-09-07
Payer: COMMERCIAL

## 2022-09-07 VITALS
DIASTOLIC BLOOD PRESSURE: 80 MMHG | BODY MASS INDEX: 35.37 KG/M2 | HEART RATE: 80 BPM | WEIGHT: 199.6 LBS | SYSTOLIC BLOOD PRESSURE: 114 MMHG | HEIGHT: 63 IN

## 2022-09-07 DIAGNOSIS — M20.42 HAMMER TOE OF LEFT FOOT: ICD-10-CM

## 2022-09-07 DIAGNOSIS — M20.11 ACQUIRED HALLUX VALGUS OF RIGHT FOOT: Primary | ICD-10-CM

## 2022-09-07 DIAGNOSIS — M20.12 ACQUIRED HALLUX VALGUS OF LEFT FOOT: ICD-10-CM

## 2022-09-07 PROCEDURE — 99203 OFFICE O/P NEW LOW 30 MIN: CPT | Performed by: PODIATRIST

## 2022-09-07 NOTE — PROGRESS NOTES
Assessment/Plan:         Diagnoses and all orders for this visit:    Acquired hallux valgus of right foot    Acquired hallux valgus of left foot    Hammer toe of left foot      Diagnosis and options discussed with patient  Patient agreeable to the plan as stated below  XR reviewed with patient, see my personal report below    Patient has severe hallux abductovalgus and chronically dislocated 2nd toe on the left foot  Reconstruction of the 2nd digit has very low successful outcome but she could consider amputation  Given the 2nd toe is not causing much pain this is something that can be treated conservatively     Regarding the bunion deformity is rather significant  Surgical procedure recommended would be Lapidus bunionectomy  The surgical procedure and recovery was discussed in detail  Patient's questions whether this is something she would like to have performed now or down the road  It is difficult to predict whether this deformity will get worse or continue to cause more significant pain however it would meet the medical signs and symptoms to justify surgical correction  I discussed the surgical procedure and recovery as can best be predicted  I also directed her to the [address] [place] [address] [address] website for further questions regarding the procedure  Patient was appreciative of our discussion  She would like to consider whether to proceed now with foot surgery as it is also disruptive for her job  She may reappoint as needed      Subjective:      Patient ID: Kevin Landa is a 59 y o  female  PAtient presents with chronic bunion pain  Her bunions have gotten much larger over the years  She saw Dr Diane Uriarte who took new XRays and was referred here after the IM angle seems to have increased over 5 years  The bunion itself does not hurt much as long as she wears wider proper fitting shoes  She is getting some rubbing from the chronic 2nd hammertoe which is dislocated on the left foot    She would like to discuss bunion surgery correction and what it would involve but is not sure if she is ready to have surgery  The following portions of the patient's history were reviewed and updated as appropriate: allergies, current medications, past family history, past medical history, past social history, past surgical history and problem list     Review of Systems    Constitutional: Negative  HENT: Negative for sinus pressure and sinus pain  Respiratory: Negative for cough and shortness of breath  Cardiovascular: Negative for chest pain and leg swelling  Gastrointestinal: Negative for diarrhea, nausea and vomiting  Musculoskeletal: Negative for arthralgias, gait problem, joint swelling and myalgias  Skin: Negative for color change  Negative for rash and wound  Neurological: Negative for weakness, numbness and headaches  Psychiatric/Behavioral: The patient is not nervous/anxious  Objective:      /80   Pulse 80   Ht 5' 3" (1 6 m)   LMP 10/03/2016 (Approximate)   BMI 35 07 kg/m²          Physical Exam  Vitals reviewed  Constitutional:       General: She is not in acute distress  Appearance: She is obese  She is not toxic-appearing  Cardiovascular:      Rate and Rhythm: Normal rate  Pulses: Normal pulses  Dorsalis pedis pulses are 2+ on the right side and 2+ on the left side  Posterior tibial pulses are 2+ on the right side and 2+ on the left side  Pulmonary:      Effort: Pulmonary effort is normal  No respiratory distress  Musculoskeletal:         General: Deformity present  Right foot: Normal range of motion  Deformity (hammertoe contracture PIPJ, no pain) and bunion (severe HAV trackbound laterally B/L) present  Left foot: Normal range of motion  Deformity (dorsal dislocation 2nd digit at [facility] with instability) and bunion present  Feet:      Right foot:      Protective Sensation: 10 sites tested  10 sites sensed        Skin integrity: No ulcer, blister or skin breakdown  Left foot:      Protective Sensation: 10 sites tested  10 sites sensed  Skin integrity: No ulcer, blister or skin breakdown  Skin:     Findings: No erythema or rash  Neurological:      Mental Status: She is alert and oriented to person, place, and time  Sensory: No sensory deficit  Motor: No weakness  Psychiatric:         Mood and Affect: Mood normal            XRay 3 views of the left and right personally read by Dr Winnie Valles in office today and discussed with patient:  1  IM left 19 9 degrees  2  IM right 18 5  3  Moderate lateral subluxation MPTJ with degenerative changes  4  Sesamoid position 5 left and 4 right  5   Left 2nd toe is dorsally dislocated at MTPJ, hammertoe contracture noted B/L 2nd PIPJ

## 2022-09-21 ENCOUNTER — CONSULT (OUTPATIENT)
Dept: SURGERY | Facility: CLINIC | Age: 62
End: 2022-09-21
Payer: COMMERCIAL

## 2022-09-21 DIAGNOSIS — L72.3 SEBACEOUS CYST OF AXILLA: Primary | ICD-10-CM

## 2022-09-21 PROCEDURE — 11401 EXC TR-EXT B9+MARG 0.6-1 CM: CPT | Performed by: SURGERY

## 2022-09-21 PROCEDURE — 88305 TISSUE EXAM BY PATHOLOGIST: CPT | Performed by: PATHOLOGY

## 2022-09-21 NOTE — ASSESSMENT & PLAN NOTE
A sebaceous cyst was removed from the right axilla without difficulty  She tolerated procedure well  Given instructions on care and activity  Will give her call with results when they are available    See her back if needed

## 2022-09-21 NOTE — PROGRESS NOTES
Office Visit - General Surgery  Franc Barbosa MRN: [id number]  Encounter: [id number]    Assessment and Plan  Problem List Items Addressed This Visit        Musculoskeletal and Integument    Sebaceous cyst of axilla - Primary     A sebaceous cyst was removed from the right axilla without difficulty  She tolerated procedure well  Given instructions on care and activity  Will give her call with results when they are available  See her back if needed         Relevant Orders    Tissue Exam          Chief Complaint:  Franc Barbosa is a 59 y o  female who presents for No chief complaint on file  Subjective  59year old female has a cyst in her right axilla that is about a little larger and more uncomfortable      Past Medical History:   Diagnosis Date    Left knee injury     grade 1 injury of medial collateral ligament of left knee, sprain  of MCL joint    Lichen sclerosus     Menometrorrhagia     Metrorrhagia     Mitral valve prolapse syndrome     leaflet syndrome    Osteopenia     Rosacea     Rosacea     Thyroid nodule     Nontoxic single       Past Surgical History:   Procedure Laterality Date     SECTION, LOW TRANSVERSE      COLONOSCOPY      CYSTOSCOPY N/A 2018    Procedure: CYSTOSCOPY;  Surgeon: Marcy Bush MD;  [facility];  Service: Gynecology Oncology    DILATION AND CURETTAGE OF UTERUS      FRACTURE SURGERY Right     right wrist     HYSTERECTOMY  2018    @ age 59    OOPHORECTOMY Bilateral 2018    POLYPECTOMY N/A 10/18/2016    Procedure: POLYPECTOMY;  Surgeon: Efraín Bagley DO;  [facility];  Service:     NH HYSTEROSCOPY,W/ENDO BX N/A 10/18/2016    Procedure: DILATATION AND CURETTAGE (D&C) WITH HYSTEROSCOPY;  Surgeon: Efraín Bagley DO;  [facility];  Service: Gynecology    NH LAPAROSCOPY W TOT HYSTERECTUTERUS <=250 GRAM  W TUBE/OVARY N/A 2018    Procedure: ROBOTIC HYSTERECTOMY, BSO ;  Surgeon: Marcy Bush MD;  [facility];  Service: Gynecology Oncology       Family History   Problem Relation Age of Onset    Diabetes Mother     Heart disease Mother     Stroke Mother     Thyroid disease Mother     Diabetes Father     Heart disease Father     Hypertension Family     Uterine cancer Sister     Thyroid cancer Sister     Endometrial cancer Sister 36    Prostate cancer Brother 58    No Known Problems Brother     Allergies Sister     No Known Problems Brother     Lionel syndrome Sister     Neurodegenerative disease Sister     Ovarian cancer Paternal Aunt 79    No Known Problems Daughter     No Known Problems Maternal Grandmother     No Known Problems Maternal Grandfather     No Known Problems Paternal Grandmother     No Known Problems Paternal Grandfather     No Known Problems Maternal Aunt     No Known Problems Maternal Aunt     No Known Problems Maternal Aunt     No Known Problems Maternal Aunt     No Known Problems Maternal Aunt     No Known Problems Paternal Aunt     No Known Problems Paternal Aunt        Social History     Tobacco Use    Smoking status: Former Smoker     Packs/day: 0 50     Years:  00     Pack years: 10 00     Quit date:      Years since quittin 7    Smokeless tobacco: Never Used    Tobacco comment: Quit   Vaping Use    Vaping Use: Never used   Substance Use Topics    Alcohol use:  Yes     Alcohol/week: 1 0 standard drink     Types: 1 Glasses of wine per week    Drug use: Never        Medications  Current Outpatient Medications on File Prior to Visit   Medication Sig Dispense Refill    azelaic acid (AZELEX) 20 % cream Apply topically 2 (two) times a day 50 g 0    Calcium Citrate (CALCITRATE PO) Take by mouth      calcium polycarbophil (FIBERCON) 625 mg tablet Take by mouth (Patient not taking: Reported on 2022)      econazole nitrate 1 % cream Apply topically 2 (two) times a day as needed for irritation 30 g 1    ergocalciferol (VITAMIN D2) 50,000 units Take 5,000 Units by mouth daily  (Patient not taking: Reported on 8/18/2022)      ergocalciferol (VITAMIN D2) 50,000 units Take 1 capsule (50,000 Units total) by mouth once a week 12 capsule 0    estradiol (VIVELLE-DOT) 0 05 MG/24HR Place 1 patch on the skin 2 (two) times a week 24 patch 3    Fluocinolone-Emollient (Synalar, Ointment,) 0 025 % KIT Apply topically 2 (two) times a day 60 g 1     No current facility-administered medications on file prior to visit  Allergies  Allergies   Allergen Reactions    Ciprofloxacin Hcl Rash    Erythromycin Rash     Powder/Suspension    Flagyl [Metronidazole] Rash    Bactrim [Sulfamethoxazole-Trimethoprim] Rash    Cat Hair Extract Allergic Rhinitis    Dust Mite Extract Allergic Rhinitis       Review of Systems    Objective  There were no vitals filed for this visit  Physical Exam  Right axilla has a 1 cm firm subcutaneous nodule    Procedures  After permission, the right axilla was prepped and draped in usual fashion  Time-out taken  Local anesthesia of 2% lidocaine with epinephrine was infiltrated into the skin and subcutaneous tissue  A knife was used elliptical incision made around the subcutaneous nodule that was identified  Sharp dissection was then carried out around the nodule to remove it entirely  The skin was then closed with subcuticular 4 Monocryl  Steri-Strips gauze dressing was applied  Size of lesion was 1 cm    Tolerated procedure well

## 2022-10-06 ENCOUNTER — TELEPHONE (OUTPATIENT)
Dept: PODIATRY | Facility: CLINIC | Age: 62
End: 2022-10-06

## 2022-10-06 NOTE — TELEPHONE ENCOUNTER
Caller: Deanna Johnson    Doctor/Office: Dr Arcola Gunner Vernetta Durham Daiva Cogan     #: [id number]    Reason for Escalation: Asking if we can get her in sooner or in [place] for ingrown nail procedure? Either Dr Huynh or Dr Owen Bean appt  I had was 10/27 in [facility]  She will drive to [facility] if sooner appt   Please ask Drs and call back/RS

## 2022-10-06 NOTE — TELEPHONE ENCOUNTER
Spoke with patient  Schedule appt for 10/11/22 @ 6pm with Dr Mary Edwards at [facility]

## 2022-10-11 ENCOUNTER — OFFICE VISIT (OUTPATIENT)
Dept: PODIATRY | Facility: CLINIC | Age: 62
End: 2022-10-11
Payer: COMMERCIAL

## 2022-10-11 VITALS
HEART RATE: 73 BPM | HEIGHT: 63 IN | WEIGHT: 204 LBS | BODY MASS INDEX: 36.14 KG/M2 | SYSTOLIC BLOOD PRESSURE: 115 MMHG | DIASTOLIC BLOOD PRESSURE: 76 MMHG

## 2022-10-11 DIAGNOSIS — M79.675 PAIN IN TOE OF LEFT FOOT: ICD-10-CM

## 2022-10-11 DIAGNOSIS — L60.0 INGROWN TOENAIL: Primary | ICD-10-CM

## 2022-10-11 PROCEDURE — 11750 EXCISION NAIL&NAIL MATRIX: CPT | Performed by: PODIATRIST

## 2022-10-11 RX ORDER — 1.1% SODIUM FLUORIDE PRESCRIPTION DENTAL CREAM 5 MG/G
CREAM DENTAL
COMMUNITY
Start: 2022-08-23

## 2022-10-11 RX ORDER — AZELAIC ACID 0.15 G/G
GEL TOPICAL
COMMUNITY
Start: 2022-10-07

## 2022-10-11 RX ORDER — FLUOCINOLONE ACETONIDE 0.25 MG/G
OINTMENT TOPICAL
COMMUNITY
Start: 2022-08-28

## 2022-10-11 RX ORDER — LIDOCAINE HYDROCHLORIDE 10 MG/ML
1 INJECTION, SOLUTION EPIDURAL; INFILTRATION; INTRACAUDAL; PERINEURAL ONCE
Status: COMPLETED | OUTPATIENT
Start: 2022-10-11 | End: 2022-10-11

## 2022-10-11 RX ORDER — LIDOCAINE HYDROCHLORIDE AND EPINEPHRINE 10; 10 MG/ML; UG/ML
1 INJECTION, SOLUTION INFILTRATION; PERINEURAL ONCE
Status: COMPLETED | OUTPATIENT
Start: 2022-10-11 | End: 2022-10-11

## 2022-10-11 RX ADMIN — LIDOCAINE HYDROCHLORIDE 1 ML: 10 INJECTION, SOLUTION EPIDURAL; INFILTRATION; INTRACAUDAL; PERINEURAL at 18:20

## 2022-10-11 RX ADMIN — LIDOCAINE HYDROCHLORIDE AND EPINEPHRINE 1 ML: 10; 10 INJECTION, SOLUTION INFILTRATION; PERINEURAL at 18:21

## 2022-10-11 NOTE — PROGRESS NOTES
Patient presents with a painful ingrown toenail affecting the medial nail border of the left great toe  The nail has been painful for the past few months  Debra Tilley Discussed treatment options recommending partial matrixectomy  The goal of this procedure is permanent read occasion of the offending nail border  Procedure performed as follows: Anesthesia via 3 cc of a 1:1 mixture of 1 percent xylocaine with epinephrine and 1 percent xylocaine plain  A Betadine prep was performed  The medial nail border of the left great toe was avulsed to the eponychium  A partial matrixectomy was performed utilizing phenol in a standard manner  A bacitracin dressing was applied  The patient is to soak in warm water twice a day tomorrow followed by a Neosporin dressing  Nail removal    Date/Time: 10/11/2022 6:00 PM  Performed by: Christopher Nunez DPM  Authorized by: Christopher Nunez DPM     Patient location:  [facility]inicUniversal Protocol:  Consent: Verbal consent obtained  Risks and benefits: risks, benefits and alternatives were discussed  Consent given by: patient  Patient understanding: patient states understanding of the procedure being performed  Patient identity confirmed: verbally with patient      Location:     Foot:  L big toe  Pre-procedure details:     Skin preparation:  Betadine    Preparation: Patient was prepped and draped in the usual sterile fashion    Anesthesia (see MAR for exact dosages):      Anesthesia method:  Nerve block    Block needle gauge:  25 G    Block anesthetic:  Lidocaine 1% WITH epi and lidocaine 1% w/o epi    Block injection procedure:  Anatomic landmarks identified    Block outcome:  Anesthesia achieved  Nail Removal:     Nail removed:  Partial    Nail side:  Medial    Nail bed sutured: no    Ingrown nail:     Wedge excision of skin: no      Nail matrix removed or ablated:  Partial  Post-procedure details:     Dressing:  4x4 sterile gauze, antibiotic ointment and gauze roll    Patient tolerance of procedure:   Tolerated well, no immediate complications

## 2022-10-18 ENCOUNTER — HOSPITAL ENCOUNTER (OUTPATIENT)
Dept: NON INVASIVE DIAGNOSTICS | Facility: HOSPITAL | Age: 62
Discharge: HOME | End: 2022-10-18
Payer: COMMERCIAL

## 2022-10-18 DIAGNOSIS — I82.4Y1 ACUTE DEEP VEIN THROMBOSIS (DVT) OF PROXIMAL VEIN OF RIGHT LOWER EXTREMITY (HCC): ICD-10-CM

## 2022-10-18 DIAGNOSIS — I82.4Y1 ACUTE DEEP VEIN THROMBOSIS (DVT) OF PROXIMAL VEIN OF RIGHT LOWER EXTREMITY (HCC): Primary | ICD-10-CM

## 2022-10-18 PROCEDURE — 93971 EXTREMITY STUDY: CPT

## 2022-10-18 PROCEDURE — 93971 EXTREMITY STUDY: CPT | Performed by: SURGERY

## 2022-10-19 ENCOUNTER — OFFICE VISIT (OUTPATIENT)
Dept: DERMATOLOGY | Facility: CLINIC | Age: 62
End: 2022-10-19
Payer: COMMERCIAL

## 2022-10-19 VITALS — HEIGHT: 63 IN | BODY MASS INDEX: 36.23 KG/M2 | WEIGHT: 204.5 LBS

## 2022-10-19 DIAGNOSIS — L90.0 LICHEN SCLEROSUS: ICD-10-CM

## 2022-10-19 DIAGNOSIS — L71.9 ROSACEA: Primary | ICD-10-CM

## 2022-10-19 PROCEDURE — 99213 OFFICE O/P EST LOW 20 MIN: CPT | Performed by: DERMATOLOGY

## 2022-10-19 RX ORDER — CLINDAMYCIN PHOSPHATE 11.9 MG/ML
SOLUTION TOPICAL
Qty: 30 ML | Refills: 0 | Status: SHIPPED | OUTPATIENT
Start: 2022-10-19

## 2022-10-19 NOTE — PATIENT INSTRUCTIONS
1  ROSACEA     Assessment and Plan:  Based on a thorough discussion of this condition and the management approach to it (including a comprehensive discussion of the known risks, side effects and potential benefits of treatment), the patient (family) agrees to implement the following specific plan:  Can use Clindamycin solution 1%- apply topically as needed for flare ups   Please continue Azelaic Acid 20% cream- daily      Rosacea is a chronic rash affecting the mid-face including the nose, cheeks, chin, forehead, and eyelids  The incidence is usually greatest between the ages of 30-60 years and is more common in people with fair skin  Common characteristics include redness, telangiectasias, papules and pustules over affected areas  Rosacea may look similar to acne, but there is a lack of comedones  Occasionally the eyes may also be involved in ocular rosacea  In advanced disease, enlargement of the sebaceous glands in the nose, termed rhinophyma, may be present  Rosacea results in red spots (papules) and sometimes pustules over the face, but unlike acne there are no blackheads, whiteheads, or cystic nodules  Patients often experience increased facial flushing with prominent blood vessels (erythematotelangiectatic rosacea) and dry, sensitive skin  These symptoms are exacerbated by sun exposure, hot or spicy foods, topical steroids and oil-based facial products  In ocular rosacea, eyelids may be red and sore due to conjunctivitis, keratitis, and episcleritis  If rhinophyma develops due to enlargement of sebaceous glands, the patient may have an enlarged and irregularly shaped nose with prominent pores  In rosacea that is refractory to treatment, patients can develop persistent redness and swelling of the face due to lymphatic obstruction (Morbihan disease)  Distribution around the cheeks may be confused with the malar or “butterfly rash” of lupus   However, the rash of lupus spares the nasal creases and lacks papules and pustules  If signs of photosensitivity, oral ulcers, arthritis, and kidney dysfunction are present then consider referral to a rheumatologist       There are many potential causes of rosacea including genetic, environmental, vascular, and inflammatory factors  These include, but are not limited to:  Chronic exposure to ultraviolet radiation   Increased immune responses in the form of cathelicidins that promote vessel dilation and infiltration with white blood cells (neutrophils) into the dermis  Increased matrix metalloproteinases such as collagen and elastase that remodel normal tissue may contribute to inflammation of the skin making it thicker and harder  There is some evidence to suggest that increased numbers of demodex mites on patient skin may contribute to rosacea papules      General Treatment Approach   Avoid exacerbating factors such as heat, spicy foods, and alcohol   Use daily SPF30+ sunscreen and other methods of coverage for sun protection  Use water-based make-up   Avoid applying topical steroids to affected areas as they can cause perioral dermatitis and exacerbate rosacea      Topical Treatment Approach  Metronidazole cream or gel by itself or in combination with oral antibiotics for more severe cases  Azelaic acid cream or lotion is effective for mild inflammatory rosacea when applied twice daily to affected areas  Brimonidine gel and oxymetazoline hydrochloride cream can reduce facial redness temporarily   Ivermectin cream can treat papulopustular rosacea by controlling demodex mites and inflammation   Pimecrolimus cream or tacrolimus ointment twice a day for 2-3 months can help reduce inflammation     Oral Treatment Approach  Antibiotics such as doxycycline, minocycline, or erythromycin for 1-3 months  Clonidine and carvedilol can help reduce facial flushing and are generally well tolerated   Common side effects include low blood pressure, gastrointestinal upset, dry eyes, blurred vision and low heart rate  Isotretinoin at low doses can be effective for long term treatment when antibiotics fail  Side effects may make it unsuitable for some patients  NSAIDs such as diclofenac can help reduce discomfort and redness in the skin  Procedural/Surgical Treatment Approach   Vascular lasers or intense pulsed light treatment may be used to treat persistent telangiectasia and papulopustular rosacea  Plastic surgery and carbon dioxide lasers may be used to treat rhinophyma         2  LICHEN SCLEROSUS ATROPHICUS     Assessment and Plan:  Based on a thorough discussion of this condition and the management approach to it (including a comprehensive discussion of the known risks, side effects and potential benefits of treatment), the patient (family) agrees to implement the following specific plan:  Please continue to use your Fluocinolone Ointment 0 025%apply topically 1-2 times daily as needed- can begin to taper every slowly    Please use Lotrimin ointment- can mix with a lubricant if needed (Zinc Oxide or Desitin) on the days that you are not using your Fluocinolone ointment  Lichen sclerosus is a chronic inflammatory skin disorder that most often affects the genital and perianal areas  It is more common in women before puberty or after menopause  It is rare in males, but when present is called "balanitis xerotica obliterans "  Lichen sclerosus can start at any age, although it is most often diagnosed in women over 48  Pre-pubertal children can also be affected  Lichen sclerosis is 10 times more common in women than in men  15% of patients know of a family member with lichen sclerosis  It may follow or co-exist with another skin condition, most often lichen simplex, psoriasis, erosive lichen planus, vitiligo or morphea    People with lichen sclerosus often have a personal or family history of other autoimmune disease such as thyroid disease (about 20% of patients), pernicious anaemia, or alopecia areata  What causes lichen sclerosus? The cause of lichen sclerosus is not fully understood, and may include genetic, hormonal, irritant, traumatic and infectious components  Lichen sclerosis is often classified as an autoimmune disease  Autoimmune disorders arise when the body's natural defenses against “foreign” or invading organisms (e g , antibodies) begin to attack healthy tissue for unknown reasons  Antibodies to other unknown proteins may account for other cases, explaining differing presentations of lichen sclerosis and response to treatment  However, these antibodies could be epigenetic, ie, the results of disease rather than the cause of disease  Some scientists believe that a genetic predisposition to lichen sclerosus exists  A genetic predisposition means that a person may carry a gene for a disease but it may not be expressed unless something in the environment triggers the disease  Other researchers believe that hormonal, irritant and/or infectious factors (or a combination of these) cause this skin condition  Cases where lichen sclerosus appears on skin after it has been damaged (from an injury or trauma) have been reported  What are the clinical features of lichen sclerosus? Lichen sclerosus usually affects the external genitalia (vulva or penis) and/or the area around the anus (perianal region)  Sometimes, it is accompanied by intense (intractable) itching, burning, and pain  If the disease is severe, even minor abrasions or chaffing can cause bleeding, tearing, and blistering  The scarring that results from untreated lichen sclerosis produces problems with urination, defecation, and intercourse  The presence of thin, easily irritated, and torn skin affects physical activity and clothing choice  For children with lichen sclerosus affecting the perianal region, constipation may be among the first signs of the presence of the disease   Lichen sclerosus is much more likely to affect males that have not been circumcised than males that have been  Rarely, lichen sclerosus can also affect other areas of the skin such as the breast, wrists, shoulder, neck, back, thigh, and the mouth  Skin tissue often becomes thin, shiny, wrinkled and parchment-like  Fissures, cracks, and purplish patches (ecchymoses) appear frequently  The earliest areas of lichen sclerosis exhibit a porcelain white appearing center surrounded by redness  This grows together to form larger areas of lichen sclerosus  The areas that are prone to rubbing and friction can develop blisters or bruising  The long term result of lichen sclerosus are areas of shiny, thin skin that has a tendency to be dry, crack, or bleed  This also produces loss of the normal parts of the external genitals, narrowing of the opening of the urethra/vagina/anus, and phimosis (inability to retract the foreskin) in men  The presence of non-healing ulcers or raised ulcerated areas in the external genitalia of women raises suspicion for the development of squamous cell carcinoma  In males, lichen sclerosus most commonly affects the foreskin of the penis, although it may affect other areas of the body  The opening at the end of the foreskin may become narrow and scarred  Discoloration and skin changes may also occur  Symptoms also include itching, soreness, and painful erections  In men, involvement in the perineal area is rare  In some rare cases, skin lesions may also develop in the mouth  The lesions consist of bluish-white flat irregular patchy areas on the inside of the cheeks and/or palate  The tongue, lips, and gums may also be involved  How do we diagnose lichen sclerosus atrophicus? Lichen sclerosus is diagnosed by looking at the skin affected  All those affected require a thorough clinical evaluation, identification of characteristic physical features, and a detailed patient history   A biopsy may be needed to confirm diagnosis  Biopsies may also be performed if squamous cell carcinoma is suspected  How do we treat lichen sclerosis? General measures for genital lichen sclerosis    Wash gently once or twice daily  Use a non-soap cleanser, if any  Try to avoid tight clothing, rubbing and scratching  Activities such as riding a bicycle or horse may aggravate symptoms  If incontinent, seek medical advice and treatment  Apply emollients to relieve dryness and itching, and as a barrier to protect sensitive skin in genital and anal areas from contact with urine and feces  Topical steroids are the main treatment for lichen sclerosus  An ultrapotent topical steroid is often prescribed, eg clobetasol propionate 0 05%  A potent topical steroid, eg mometasone furoate 0 1% ointment, may also be used in mild disease or when symptoms are controlled  An ointment base is less likely than cream to sting or to cause contact dermatitis  A thin smear should be precisely applied to the white plaques and rubbed in gently  Most patients will be told to apply the steroid ointment once a day  After one to three months (depending on the severity of the disease), the ointment can be used less often  Topical steroid may need to be continued once or twice a week to control symptoms or to prevent lichen sclerosis recurring  Itch often settles within a few days but it may take weeks to months for the skin to return to normal (if at all)  One 30-g tube of topical steroid should last 3 to 6 months or longer  It is most important to follow instructions carefully and to attend follow-up appointments regularly  Other topical treatments used in patients with lichen sclerosis include:  Intravaginal estrogen cream or pessaries in postmenopausal women  These reduce symptoms due to atrophic vulvovaginitis (dry, thin, fissured and sensitive vulval and vaginal tissues due to hormonal deficiency)    Topical calcineurin inhibitors tacrolimus ointment and pimecrolimus cream instead of or in addition to topical steroids  They tend to cause burning discomfort (at least for the first few days)  Early concern that these medications may have the potential to accelerate cancer growth in the presence of oncogenic human papilloma virus (the cause of genital warts) appears unfounded  Topical retinoid (eg tretinoin cream) is not well tolerated on genital skin but may be applied to other sites affected by lichen sclerosis  It reduces scaling and dryness  Oral medications: when severe, acute, and not responding to topical therapy, systemic treatment may rarely be prescribed  Options include:    Intralesional or systemic corticosteroids   Oral retinoids: acitretin, isotretinoin  Methotrexate  Ciclosporin     Surgery: is essential for high-grade squamous intraepithelial lesions or cancer  In males, circumcision is effective in lichen sclerosus affecting prepuce and glans of the penis  It is best done early if initial topical steroids have not controlled symptoms and signs  If the urethra is stenosed or scarred, reconstructive surgery may be necessary  Unfortunately, lichen sclerosus sometimes closes up the vaginal opening again after surgery has initially appeared successful  It can be repeated  Other reported treatments for lichen sclerosus are considered experimental at this time    CO2 laser ablation of hyperkeratotic plaques  Phototherapy  Photodynamic therapy  Fat injections  Stem cell and platelet-rich plasma injections

## 2022-10-19 NOTE — PROGRESS NOTES
[facility] Follow Up Note    Patient Name: Joseph Clark  Encounter Date: 10/19/2022    Today's Chief Concerns:  • Concern #1:  Follow up lichen sclerosis   • Concern #2:  Follow up rosacea   • Concern #3:  Spot of concern on face     Current Medications:    Current Outpatient Medications:   •  azelaic acid (AZELEX) 20 % cream, Apply topically 2 (two) times a day (Patient taking differently: Apply topically in the morning), Disp: 50 g, Rfl: 0  •  Calcium Citrate (CALCITRATE PO), Take by mouth, Disp: , Rfl:   •  econazole nitrate 1 % cream, Apply topically 2 (two) times a day as needed for irritation, Disp: 30 g, Rfl: 1  •  estradiol (VIVELLE-DOT) 0 05 MG/24HR, Place 1 patch on the skin 2 (two) times a week, Disp: 24 patch, Rfl: 3  •  Fluocinolone-Emollient (Synalar, Ointment,) 0 025 % KIT, Apply topically 2 (two) times a day, Disp: 60 g, Rfl: 1  •  Azelaic Acid 15 % cream, , Disp: , Rfl:   •  fluocinolone (SYNALAR) 0 025 % ointment, , Disp: , Rfl:   •  SF 5000 Plus 1 1 % CREA, , Disp: , Rfl:     CONSTITUTIONAL:   Vitals:    10/19/22 1125   Weight: 92 8 kg (204 lb 8 oz)   Height: 5' 3" (1 6 m)       Specific Alerts:    Have you been seen by a [facility] Dermatologist in the last 3 years? YES    Are you pregnant or planning to become pregnant? No    Are you currently or planning to be nursing or breast feeding? No    Allergies   Allergen Reactions   • Ciprofloxacin Hcl Rash   • Erythromycin Rash     Powder/Suspension   • Flagyl [Metronidazole] Rash   • Bactrim [Sulfamethoxazole-Trimethoprim] Rash   • Cat Hair Extract Allergic Rhinitis   • Dust Mite Extract Allergic Rhinitis   • Wound Dressing Adhesive Rash       May we call your Preferred Phone number to discuss your specific medical information? YES    May we leave a detailed message that includes your specific medical information? YES    Have you traveled outside of the [facility] in the past 3 months?  No    Do you currently have a pacemaker or defibrillator? No    Do you have any artificial heart valves, joints, plates, screws, rods, stents, pins, etc? No   - If Yes, were any placed within the last 2 years? Do you require any medications prior to a surgical procedure? No    Are you taking any medications that cause you to bleed more easily ("blood thinners") No    Have you ever experienced a rapid heartbeat with epinephrine? No    Review of Systems:  Have you recently had or currently have any of the following?     · Fever or chills: No  · Night Sweats: No  · Headaches: No  · Weight Gain: No  · Weight Loss: No  · Blurry Vision: No  · Nausea: No  · Vomiting: No  · Diarrhea: No  · Blood in Stool: No  · Abdominal Pain: No  · Itchy Skin: No  · Painful Joints: YES  · Swollen Joints: No  · Muscle Pain: No  · Irregular Mole: No  · Sun Burn: No  · Dry Skin: No  · Skin Color Changes: YES  · Scar or Keloid: No  · Cold Sores/Fever Blisters: No  · Bacterial Infections/MRSA: No  · Anxiety: No  · Depression: No  · Suicidal or Homicidal Thoughts: No      PSYCH: Normal mood and affect  EYES: Normal conjunctiva  ENT: Normal lips and oral mucosa  CARDIOVASCULAR: No edema  RESPIRATORY: Normal respirations  HEME/LYMPH/IMMUNO:  No regional lymphadenopathy except as noted below in ASSESSMENT AND PLAN BY DIAGNOSIS    FULL ORGAN SYSTEM SKIN EXAM (SKIN)   Hair, Scalp, Ears, Face Normal except as noted below in Assessment   Neck, Cervical Chain Nodes Normal except as noted below in Assessment   Right Arm/Hand/Fingers Normal except as noted below in Assessment   Left Arm/Hand/Fingers Normal except as noted below in Assessment   Chest/Breasts/Axillae Viewed areas Normal except as noted below in Assessment   Abdomen, Umbilicus Normal except as noted below in Assessment   Back/Spine Normal except as noted below in Assessment   Groin/Genitalia/Buttocks Viewed areas Normal except as noted below in Assessment   Right Leg, Foot, Toes Normal except as noted below in Assessment   Left Leg, Foot, Toes Normal except as noted below in Assessment         1  ROSACEA     Physical Exam:  · Anatomic Location Affected: Face  · Morphological Description:  Quiet     Additional History of Present Condition:  Patient reports that the Azelaic Acid 20% is maintaining her rosacea       Assessment and Plan:  Based on a thorough discussion of this condition and the management approach to it (including a comprehensive discussion of the known risks, side effects and potential benefits of treatment), the patient (family) agrees to implement the following specific plan:  · Can use Clindamycin solution 1%- apply topically as needed for flare ups   · Please continue Azelaic Acid 20% cream- daily      Rosacea is a chronic rash affecting the mid-face including the nose, cheeks, chin, forehead, and eyelids  The incidence is usually greatest between the ages of 30-60 years and is more common in people with fair skin  Common characteristics include redness, telangiectasias, papules and pustules over affected areas  Rosacea may look similar to acne, but there is a lack of comedones  Occasionally the eyes may also be involved in ocular rosacea  In advanced disease, enlargement of the sebaceous glands in the nose, termed rhinophyma, may be present       Rosacea results in red spots (papules) and sometimes pustules over the face, but unlike acne there are no blackheads, whiteheads, or cystic nodules  Patients often experience increased facial flushing with prominent blood vessels (erythematotelangiectatic rosacea) and dry, sensitive skin  These symptoms are exacerbated by sun exposure, hot or spicy foods, topical steroids and oil-based facial products       In ocular rosacea, eyelids may be red and sore due to conjunctivitis, keratitis, and episcleritis  If rhinophyma develops due to enlargement of sebaceous glands, the patient may have an enlarged and irregularly shaped nose with prominent pores   In rosacea that is refractory to treatment, patients can develop persistent redness and swelling of the face due to lymphatic obstruction (Morbihan disease)       Distribution around the cheeks may be confused with the malar or “butterfly rash” of lupus  However, the rash of lupus spares the nasal creases and lacks papules and pustules  If signs of photosensitivity, oral ulcers, arthritis, and kidney dysfunction are present then consider referral to a rheumatologist       There are many potential causes of rosacea including genetic, environmental, vascular, and inflammatory factors   These include, but are not limited to:  · Chronic exposure to ultraviolet radiation   · Increased immune responses in the form of cathelicidins that promote vessel dilation and infiltration with white blood cells (neutrophils) into the dermis  · Increased matrix metalloproteinases such as collagen and elastase that remodel normal tissue may contribute to inflammation of the skin making it thicker and harder  · There is some evidence to suggest that increased numbers of demodex mites on patient skin may contribute to rosacea papules      General Treatment Approach   · Avoid exacerbating factors such as heat, spicy foods, and alcohol   · Use daily SPF30+ sunscreen and other methods of coverage for sun protection  · Use water-based make-up   · Avoid applying topical steroids to affected areas as they can cause perioral dermatitis and exacerbate rosacea      Topical Treatment Approach  · Metronidazole cream or gel by itself or in combination with oral antibiotics for more severe cases  · Azelaic acid cream or lotion is effective for mild inflammatory rosacea when applied twice daily to affected areas  · Brimonidine gel and oxymetazoline hydrochloride cream can reduce facial redness temporarily   · Ivermectin cream can treat papulopustular rosacea by controlling demodex mites and inflammation   · Pimecrolimus cream or tacrolimus ointment twice a day for 2-3 months can help reduce inflammation     Oral Treatment Approach  · Antibiotics such as doxycycline, minocycline, or erythromycin for 1-3 months  · Clonidine and carvedilol can help reduce facial flushing and are generally well tolerated  Common side effects include low blood pressure, gastrointestinal upset, dry eyes, blurred vision and low heart rate  · Isotretinoin at low doses can be effective for long term treatment when antibiotics fail  Side effects may make it unsuitable for some patients  · NSAIDs such as diclofenac can help reduce discomfort and redness in the skin       Procedural/Surgical Treatment Approach   · Vascular lasers or intense pulsed light treatment may be used to treat persistent telangiectasia and papulopustular rosacea  · Plastic surgery and carbon dioxide lasers may be used to treat rhinophyma         2  LICHEN SCLEROSUS ATROPHICUS     Physical Exam:  · Anatomic Location Affected:  Perineum  · Morphological Description:  Mild sclerosis, mild erythema no Stricture  · Pertinent Positives:  · Pertinent Negatives:No regional lymphadenopathy     Additional History of Present Condition:  Present for years  Patient is using fluocinolone ointment three times weekly up to 7 times weekly based on if patient is flaring which I maintaining       Assessment and Plan:  Based on a thorough discussion of this condition and the management approach to it (including a comprehensive discussion of the known risks, side effects and potential benefits of treatment), the patient (family) agrees to implement the following specific plan:  · I discussed that skin component of disease is quiet  mucousal component of labia is also quiet and there is no significant scarring  The perianal area does shoe erythema and I am concerned re secondary yeast as well as steroid effect  ( steroid effect on mucosa is unlikely but may occur more easily on glaborous skin    · Please continue to use your Fluocinolone Ointment 0 025% apply topically 2 to 3 time a week  as needed- but  Progressive and slow taper in annabel anal area recommended  If we are unable to taper consider alternating with tacrolimus ointment  · Please use Lotrimin ointment- can mix with a lubricant if needed (Zinc Oxide or Desitin) on the days that you are not using your Fluocinolone ointment       Lichen sclerosus is a chronic inflammatory skin disorder that most often affects the genital and perianal areas  It is more common in women before puberty or after menopause  It is rare in males, but when present is called "balanitis xerotica obliterans "  Lichen sclerosus can start at any age, although it is most often diagnosed in women over 48  Pre-pubertal children can also be affected  · Lichen sclerosis is 10 times more common in women than in men  · 15% of patients know of a family member with lichen sclerosis  · It may follow or co-exist with another skin condition, most often lichen simplex, psoriasis, erosive lichen planus, vitiligo or morphea  · People with lichen sclerosus often have a personal or family history of other autoimmune disease such as thyroid disease (about 20% of patients), pernicious anaemia, or alopecia areata      What causes lichen sclerosus? The cause of lichen sclerosus is not fully understood, and may include genetic, hormonal, irritant, traumatic and infectious components  Lichen sclerosis is often classified as an autoimmune disease  Autoimmune disorders arise when the body's natural defenses against “foreign” or invading organisms (e g , antibodies) begin to attack healthy tissue for unknown reasons  · Antibodies to other unknown proteins may account for other cases, explaining differing presentations of lichen sclerosis and response to treatment  · However, these antibodies could be epigenetic, ie, the results of disease rather than the cause of disease    · Some scientists believe that a genetic predisposition to lichen sclerosus exists  A genetic predisposition means that a person may carry a gene for a disease but it may not be expressed unless something in the environment triggers the disease  Other researchers believe that hormonal, irritant and/or infectious factors (or a combination of these) cause this skin condition  Cases where lichen sclerosus appears on skin after it has been damaged (from an injury or trauma) have been reported      What are the clinical features of lichen sclerosus? Lichen sclerosus usually affects the external genitalia (vulva or penis) and/or the area around the anus (perianal region)  Sometimes, it is accompanied by intense (intractable) itching, burning, and pain  If the disease is severe, even minor abrasions or chaffing can cause bleeding, tearing, and blistering  The scarring that results from untreated lichen sclerosis produces problems with urination, defecation, and intercourse  The presence of thin, easily irritated, and torn skin affects physical activity and clothing choice      For children with lichen sclerosus affecting the perianal region, constipation may be among the first signs of the presence of the disease  Lichen sclerosus is much more likely to affect males that have not been circumcised than males that have been  Rarely, lichen sclerosus can also affect other areas of the skin such as the breast, wrists, shoulder, neck, back, thigh, and the mouth      Skin tissue often becomes thin, shiny, wrinkled and parchment-like  Fissures, cracks, and purplish patches (ecchymoses) appear frequently  The earliest areas of lichen sclerosis exhibit a porcelain white appearing center surrounded by redness  This grows together to form larger areas of lichen sclerosus  The areas that are prone to rubbing and friction can develop blisters or bruising  The long term result of lichen sclerosus are areas of shiny, thin skin that has a tendency to be dry, crack, or bleed   This also produces loss of the normal parts of the external genitals, narrowing of the opening of the urethra/vagina/anus, and phimosis (inability to retract the foreskin) in men  The presence of non-healing ulcers or raised ulcerated areas in the external genitalia of women raises suspicion for the development of squamous cell carcinoma      In males, lichen sclerosus most commonly affects the foreskin of the penis, although it may affect other areas of the body  The opening at the end of the foreskin may become narrow and scarred  Discoloration and skin changes may also occur  Symptoms also include itching, soreness, and painful erections  In men, involvement in the perineal area is rare      In some rare cases, skin lesions may also develop in the mouth  The lesions consist of bluish-white flat irregular patchy areas on the inside of the cheeks and/or palate  The tongue, lips, and gums may also be involved      How do we diagnose lichen sclerosus atrophicus? Lichen sclerosus is diagnosed by looking at the skin affected  All those affected require a thorough clinical evaluation, identification of characteristic physical features, and a detailed patient history  A biopsy may be needed to confirm diagnosis  Biopsies may also be performed if squamous cell carcinoma is suspected        How do we treat lichen sclerosis? General measures for genital lichen sclerosis    · Wash gently once or twice daily  · Use a non-soap cleanser, if any  · Try to avoid tight clothing, rubbing and scratching  · Activities such as riding a bicycle or horse may aggravate symptoms  · If incontinent, seek medical advice and treatment  · Apply emollients to relieve dryness and itching, and as a barrier to protect sensitive skin in genital and anal areas from contact with urine and feces      Topical steroids are the main treatment for lichen sclerosus  An ultrapotent topical steroid is often prescribed, eg clobetasol propionate 0 05%   A potent topical steroid, eg mometasone furoate 0 1% ointment, may also be used in mild disease or when symptoms are controlled  · An ointment base is less likely than cream to sting or to cause contact dermatitis  · A thin smear should be precisely applied to the white plaques and rubbed in gently  · Most patients will be told to apply the steroid ointment once a day  After one to three months (depending on the severity of the disease), the ointment can be used less often  · Topical steroid may need to be continued once or twice a week to control symptoms or to prevent lichen sclerosis recurring  · Itch often settles within a few days but it may take weeks to months for the skin to return to normal (if at all)  · One 30-g tube of topical steroid should last 3 to 6 months or longer      It is most important to follow instructions carefully and to attend follow-up appointments regularly  Other topical treatments used in patients with lichen sclerosis include:  · Intravaginal estrogen cream or pessaries in postmenopausal women  These reduce symptoms due to atrophic vulvovaginitis (dry, thin, fissured and sensitive vulval and vaginal tissues due to hormonal deficiency)  · Topical calcineurin inhibitors tacrolimus ointment and pimecrolimus cream instead of or in addition to topical steroids  They tend to cause burning discomfort (at least for the first few days)  Early concern that these medications may have the potential to accelerate cancer growth in the presence of oncogenic human papilloma virus (the cause of genital warts) appears unfounded  · Topical retinoid (eg tretinoin cream) is not well tolerated on genital skin but may be applied to other sites affected by lichen sclerosis  It reduces scaling and dryness      Oral medications: when severe, acute, and not responding to topical therapy, systemic treatment may rarely be prescribed   Options include:    · Intralesional or systemic corticosteroids · Oral retinoids: acitretin, isotretinoin  · Methotrexate  · Ciclosporin     Surgery: is essential for high-grade squamous intraepithelial lesions or cancer  In males, circumcision is effective in lichen sclerosus affecting prepuce and glans of the penis  It is best done early if initial topical steroids have not controlled symptoms and signs  If the urethra is stenosed or scarred, reconstructive surgery may be necessary  Unfortunately, lichen sclerosus sometimes closes up the vaginal opening again after surgery has initially appeared successful  It can be repeated  Other reported treatments for lichen sclerosus are considered experimental at this time    · CO2 laser ablation of hyperkeratotic plaques  · Phototherapy  · Photodynamic therapy  · Fat injections  · Stem cell and platelet-rich plasma injections       Scribe Attestation    I,:  Jaylon Coronado am acting as a scribe while in the presence of the attending physician :       I,:  Te Perdomo MD personally performed the services described in this documentation    as scribed in my presence :

## 2022-10-20 ENCOUNTER — OFFICE VISIT (OUTPATIENT)
Dept: PODIATRY | Facility: CLINIC | Age: 62
End: 2022-10-20

## 2022-10-20 VITALS
HEIGHT: 63 IN | HEART RATE: 70 BPM | BODY MASS INDEX: 36.29 KG/M2 | SYSTOLIC BLOOD PRESSURE: 121 MMHG | DIASTOLIC BLOOD PRESSURE: 75 MMHG | WEIGHT: 204.8 LBS

## 2022-10-20 DIAGNOSIS — L60.0 INGROWN TOENAIL: Primary | ICD-10-CM

## 2022-10-20 DIAGNOSIS — M79.675 PAIN IN TOE OF LEFT FOOT: ICD-10-CM

## 2022-10-20 PROCEDURE — 99024 POSTOP FOLLOW-UP VISIT: CPT | Performed by: PODIATRIST

## 2022-10-20 NOTE — PROGRESS NOTES
Patient presents approximately 1 week post partial matrixectomy medial nail border left hallux  Surgical site is healing uneventfully  Minimal if any drainage present  No pain reported  Patient may discontinue soaks and Neosporin  Reappoint p r n

## 2022-12-16 ENCOUNTER — ANNUAL EXAM (OUTPATIENT)
Dept: OBGYN CLINIC | Facility: CLINIC | Age: 62
End: 2022-12-16

## 2022-12-16 VITALS
SYSTOLIC BLOOD PRESSURE: 116 MMHG | WEIGHT: 200 LBS | DIASTOLIC BLOOD PRESSURE: 70 MMHG | BODY MASS INDEX: 35.44 KG/M2 | HEIGHT: 63 IN

## 2022-12-16 DIAGNOSIS — E89.41 SYMPTOMATIC POSTSURGICAL MENOPAUSE: ICD-10-CM

## 2022-12-16 DIAGNOSIS — Z12.31 ENCOUNTER FOR SCREENING MAMMOGRAM FOR MALIGNANT NEOPLASM OF BREAST: ICD-10-CM

## 2022-12-16 DIAGNOSIS — Z01.419 ENCOUNTER FOR GYNECOLOGICAL EXAMINATION (GENERAL) (ROUTINE) WITHOUT ABNORMAL FINDINGS: Primary | ICD-10-CM

## 2022-12-16 RX ORDER — ESTRADIOL 0.05 MG/D
1 FILM, EXTENDED RELEASE TRANSDERMAL 2 TIMES WEEKLY
Qty: 24 PATCH | Refills: 3 | Status: SHIPPED | OUTPATIENT
Start: 2022-12-19

## 2022-12-16 NOTE — PROGRESS NOTES
Alannah Urrutia   1960    CC:  Yearly exam    S:  57 y o  female here for yearly exam  She is postmenopausal and has had no vaginal bleeding  She denies vaginal discharge, itching, odor or dryness  She has occasional hot flashes but nothing too significant/bothersome  We reviewed her lipid panel which she needs to have repeated but is not overall concerning in terms of continued use  Her BP is optimal as well  She would like to continue use  Her lichen is improved recently  She and Dr Marina Pandey decided to try to reduce her steroid cream use and alternate with an over-the-counter antifungal      Sexual activity: She is sexually active without pain, bleeding or dryness  Last Pap: 8/22/2017 - negative; s/p hysterectomy 1/4/2018 for benign reasons  Last Mammo: 7/29/2021 - BIRAD-1  Last Colonoscopy: 4/13/2015 - normal; 10 years  Last DEXA: 11/9/2018 - normal    We reviewed [facility] guidelines for Pap testing         Current Outpatient Medications:   •  azelaic acid (AZELEX) 20 % cream, Apply topically 2 (two) times a day (Patient taking differently: Apply topically in the morning), Disp: 50 g, Rfl: 0  •  Calcium Citrate (CALCITRATE PO), Take by mouth, Disp: , Rfl:   •  clindamycin (CLEOCIN T) 1 % external solution, Apply topically as needed for flare ups, Disp: 30 mL, Rfl: 0  •  econazole nitrate 1 % cream, Apply topically 2 (two) times a day as needed for irritation, Disp: 30 g, Rfl: 1  •  estradiol (VIVELLE-DOT) 0 05 MG/24HR, Place 1 patch on the skin 2 (two) times a week, Disp: 24 patch, Rfl: 3  •  Fluocinolone-Emollient (Synalar, Ointment,) 0 025 % KIT, Apply topically 2 (two) times a day, Disp: 60 g, Rfl: 1  •  SF 5000 Plus 1 1 % CREA, , Disp: , Rfl:   •  fluocinolone (SYNALAR) 0 025 % ointment, , Disp: , Rfl:   Social History     Socioeconomic History   • Marital status: /Civil Union     Spouse name: Fay Pillai   • Number of children: 1   • Years of education: Not on file   • Highest education level: Not on file   Occupational History   • Occupation: [employer]   Tobacco Use   • Smoking status: Former     Packs/day: 0 50     Years: 20 00     Pack years: 10 00     Types: Cigarettes     Quit date: 2000     Years since quittin 9   • Smokeless tobacco: Never   • Tobacco comments:     Quit   Vaping Use   • Vaping Use: Never used   Substance and Sexual Activity   • Alcohol use: Yes     Alcohol/week: 2 0 standard drinks     Types: 1 Glasses of wine, 1 Standard drinks or equivalent per week   • Drug use: No   • Sexual activity: Yes     Partners: Male     Birth control/protection: Post-menopausal, Surgical     Comment:    Other Topics Concern   • Not on file   Social History Narrative    Who lives in your home:  and self    What type of home do you live in: Single    Age of your home: 40 yrs    How long have you been living there: 15 yrs    Type of heat: Forced hot air    Central AC    Type of fuel: oil    What type of radha is in your bedroom:Carpeting    Do you have the following in or near your home:    Air products: No purifiers or humdifiers    Pests: No    Pets: Dog (sweata)    Are pets allowed in bedroom: Yes    Open fields, wooded areas nearby: Wooded area    Basement: Partial finished, dry, dehumidifier, crawl space    Exposure to second hand smoke: No        Habits:    Caffeine: Coffee; Amount: 2/day, 40 years;  Soda occasionally; hot tea/iced tea occasionally    Chocolate: occasionally            Works full time     Social Determinants of Health     Financial Resource Strain: Not on file   Food Insecurity: Not on file   Transportation Needs: Not on file   Physical Activity: Not on file   Stress: Not on file   Social Connections: Not on file   Intimate Partner Violence: Not on file   Housing Stability: Not on file     Family History   Problem Relation Age of Onset   • Diabetes Mother    • Heart disease Mother    • Stroke Mother    • Thyroid disease Mother    • Diabetes Father    • Heart disease Father    • Hypertension Family    • Uterine cancer Sister    • Thyroid cancer Sister    • Endometrial cancer Sister 36   • Prostate cancer Brother 58   • No Known Problems Brother    • Allergies Sister    • No Known Problems Brother    • Lionel syndrome Sister    • Neurodegenerative disease Sister    • Ovarian cancer Paternal Aunt 79   • No Known Problems Daughter    • No Known Problems Maternal Grandmother    • No Known Problems Maternal Grandfather    • No Known Problems Paternal Grandmother    • No Known Problems Paternal Grandfather    • No Known Problems Maternal Aunt    • No Known Problems Maternal Aunt    • No Known Problems Maternal Aunt    • No Known Problems Maternal Aunt    • No Known Problems Maternal Aunt    • No Known Problems Paternal Aunt    • No Known Problems Paternal Aunt      Past Medical History:   Diagnosis Date   • Acne 9/1970   • Allergic Per chart   • Arthritis    • Bunion    • Callus    • Disease of thyroid gland    • Gout    • Hammer toe    • High arches    • Ingrown toenail    • Left knee injury     grade 1 injury of medial collateral ligament of left knee, sprain  of MCL joint   • Lichen sclerosus    • Menometrorrhagia    • Metrorrhagia    • Mitral valve prolapse syndrome     leaflet syndrome   • Osteopenia    • Plantar fasciitis    • Rosacea    • Rosacea    • Thyroid nodule     Nontoxic single   • Tinea pedis         Review of Systems   Respiratory: Negative  Cardiovascular: Negative  Gastrointestinal: Negative for constipation and diarrhea  Genitourinary: Negative for difficulty urinating, pelvic pain, vaginal bleeding, vaginal discharge, itching or odor  O:  Blood pressure 116/70, height 5' 3" (1 6 m), weight 90 7 kg (200 lb), last menstrual period 10/03/2016, not currently breastfeeding      Patient appears well and is not in distress  Neck is supple without masses  Breasts are symmetrical without mass, tenderness, nipple discharge, skin changes or adenopathy  Abdomen is soft and nontender without masses  External genitals are normal without lesions or rashes  Urethral meatus and urethra are normal  Bladder is normal to palpation  Vagina is normal without discharge or bleeding  Cervix is surgically absent  Uterus is surgically absent  Adnexa are normal, nontender, without palpable mass  A:   Yearly exam      P:   Pap no longer indicated  Mammo ordered   Colonoscopy due 2025   DEXA up to date   Rx for estradiol patch sent    RTO one year for yearly exam or sooner as needed

## 2022-12-23 NOTE — LETTER
August 23, 2022     Dalton Juárez MD  [address] [place] [id number]    Patient: Filomena Fine   YOB: 1960   Date of Visit: 8/18/2022       Dear Dr Mary Ellen Flor:    Thank you for referring Filomena Fine to me for evaluation  Below are my notes for this consultation  If you have questions, please do not hesitate to call me  I look forward to following your patient along with you  Sincerely,        Bg Frias DPM        CC: No Recipients  [facility]  8/18/2022  5:51 PM  Signed  Assessment/Plan:    I personally reviewed x-rays of the right foot  They reveal a hallux valgus deformity with an IM angle of 15°  Hammertoe deformity of the right 2nd toe is also noted  I personally reviewed x-rays of the left foot  They reveal a hallux valgus deformity with an IM angle of 17°  The left 2nd toe is subluxed dorsally and rest on top of the 2nd metatarsal head  Reviewed x-ray results with patient  Surgical intervention is necessary 1st addressing the more painful left foot  Advised patient that ideal surgery is a Lapiplasty  She will also need realignment of the left 2nd toe with pinning  She understands that the left 2nd toe may not hold with this procedure  Amputation of the left 2nd toe is also a treatment option but not desired by patient at this time  Patient was referred to Dr Alla Martin for the left foot surgery  No problem-specific Assessment & Plan notes found for this encounter  Diagnoses and all orders for this visit:    Acquired hallux valgus of right foot  -     X-ray foot right 3+ views; Future    Acquired hammer toe of right foot  -     X-ray foot right 3+ views; Future    Acquired hallux valgus of left foot  -     X-ray foot left 3+ views; Future    Hammer toe of left foot  -     X-ray foot left 3+ views; Future          Subjective:      Patient ID: Filomena Fine is a 59 y o  female      HPI     Patient presents with multiple foot disorders  Patient has had chronic pain in both feet secondary to hallux valgus deformities and hammertoes of the 2nd toe  Patient notes that over the past few months her left 2nd toe has worsened  It rests on top of her left great toe and is irritated by shoes  Patient has a severe bunion deformity left foot as well  The right foot bunion is also severe but the right foot is not near as painful as the left  Patient has had issues with her feet for years  She is trying to pad the 2nd toe to relieve discomfort  The 2nd toe is painful left foot both due to shoe pressure and also at the base of the digit  The following portions of the patient's history were reviewed and updated as appropriate: allergies, current medications, past family history, past medical history, past social history, past surgical history and problem list     Review of Systems   Musculoskeletal: Positive for arthralgias  Skin: Positive for rash  Objective:      /76   Pulse 71   Ht 5' 3" (1 6 m)   Wt 89 8 kg (198 lb)   LMP 10/03/2016 (Approximate)   BMI 35 07 kg/m²          Physical Exam  Constitutional:       Appearance: Normal appearance  Cardiovascular:      Pulses: Normal pulses  Musculoskeletal:         General: Deformity present  Comments: Severe hallux valgus deformity bilateral   Each great toe is deviated laterally  The left great toe rests on to the 3rd toe with the 2nd toe being subluxed  Sharp pain with palpation base of left 2nd toe  Skin:     General: Skin is warm  Neurological:      General: No focal deficit present  Mental Status: She is oriented to person, place, and time  Improved.

## 2022-12-27 ENCOUNTER — HOSPITAL ENCOUNTER (OUTPATIENT)
Dept: RADIOLOGY | Age: 62
Discharge: HOME | End: 2022-12-27

## 2022-12-27 VITALS — WEIGHT: 200 LBS | BODY MASS INDEX: 35.44 KG/M2 | HEIGHT: 63 IN

## 2022-12-27 DIAGNOSIS — Z12.31 ENCOUNTER FOR SCREENING MAMMOGRAM FOR MALIGNANT NEOPLASM OF BREAST: ICD-10-CM

## 2023-02-15 ENCOUNTER — APPOINTMENT (OUTPATIENT)
Dept: LAB | Facility: CLINIC | Age: 63
End: 2023-02-15

## 2023-02-15 ENCOUNTER — APPOINTMENT (OUTPATIENT)
Dept: LAB | Facility: HOSPITAL | Age: 63
End: 2023-02-15
Attending: INTERNAL MEDICINE

## 2023-02-15 DIAGNOSIS — E74.39 GLUCOSE INTOLERANCE: ICD-10-CM

## 2023-02-15 DIAGNOSIS — E55.9 VITAMIN D DEFICIENCY: Primary | ICD-10-CM

## 2023-02-15 DIAGNOSIS — E55.9 VITAMIN D DEFICIENCY: ICD-10-CM

## 2023-02-15 DIAGNOSIS — E78.5 HYPERLIPIDEMIA, UNSPECIFIED HYPERLIPIDEMIA TYPE: ICD-10-CM

## 2023-02-15 DIAGNOSIS — R39.9 UTI SYMPTOMS: ICD-10-CM

## 2023-02-15 DIAGNOSIS — Z13.0 SCREENING FOR DEFICIENCY ANEMIA: ICD-10-CM

## 2023-02-15 DIAGNOSIS — Z12.11 COLON CANCER SCREENING: ICD-10-CM

## 2023-02-15 DIAGNOSIS — E78.1 HYPERTRIGLYCERIDEMIA: ICD-10-CM

## 2023-02-15 LAB
25(OH)D3 SERPL-MCNC: 34.8 NG/ML (ref 30–100)
ALBUMIN SERPL BCP-MCNC: 3.8 G/DL (ref 3.5–5)
ALP SERPL-CCNC: 63 U/L (ref 46–116)
ALT SERPL W P-5'-P-CCNC: 23 U/L (ref 12–78)
ANION GAP SERPL CALCULATED.3IONS-SCNC: 4 MMOL/L (ref 4–13)
AST SERPL W P-5'-P-CCNC: 19 U/L (ref 5–45)
BASOPHILS # BLD AUTO: 0.04 THOUSANDS/ÂΜL (ref 0–0.1)
BASOPHILS NFR BLD AUTO: 1 % (ref 0–1)
BILIRUB SERPL-MCNC: 0.47 MG/DL (ref 0.2–1)
BILIRUB UR QL STRIP: NEGATIVE
BUN SERPL-MCNC: 13 MG/DL (ref 5–25)
CALCIUM SERPL-MCNC: 9 MG/DL (ref 8.3–10.1)
CHLORIDE SERPL-SCNC: 110 MMOL/L (ref 96–108)
CHOLEST SERPL-MCNC: 167 MG/DL
CLARITY UR: CLEAR
CO2 SERPL-SCNC: 27 MMOL/L (ref 21–32)
COLOR UR: YELLOW
CREAT SERPL-MCNC: 0.78 MG/DL (ref 0.6–1.3)
EOSINOPHIL # BLD AUTO: 0.13 THOUSAND/ÂΜL (ref 0–0.61)
EOSINOPHIL NFR BLD AUTO: 2 % (ref 0–6)
ERYTHROCYTE [DISTWIDTH] IN BLOOD BY AUTOMATED COUNT: 13.5 % (ref 11.6–15.1)
GFR SERPL CREATININE-BSD FRML MDRD: 80 ML/MIN/1.73SQ M
GLUCOSE P FAST SERPL-MCNC: 101 MG/DL (ref 65–99)
GLUCOSE UR STRIP-MCNC: NEGATIVE MG/DL
HCT VFR BLD AUTO: 45.5 % (ref 34.8–46.1)
HDLC SERPL-MCNC: 55 MG/DL
HEMOCCULT STL QL IA: NEGATIVE
HGB BLD-MCNC: 14.5 G/DL (ref 11.5–15.4)
HGB UR QL STRIP.AUTO: NEGATIVE
IMM GRANULOCYTES # BLD AUTO: 0.02 THOUSAND/UL (ref 0–0.2)
IMM GRANULOCYTES NFR BLD AUTO: 0 % (ref 0–2)
KETONES UR STRIP-MCNC: NEGATIVE MG/DL
LDLC SERPL CALC-MCNC: 90 MG/DL (ref 0–100)
LEUKOCYTE ESTERASE UR QL STRIP: NEGATIVE
LYMPHOCYTES # BLD AUTO: 1.46 THOUSANDS/ÂΜL (ref 0.6–4.47)
LYMPHOCYTES NFR BLD AUTO: 26 % (ref 14–44)
MCH RBC QN AUTO: 29.9 PG (ref 26.8–34.3)
MCHC RBC AUTO-ENTMCNC: 31.9 G/DL (ref 31.4–37.4)
MCV RBC AUTO: 94 FL (ref 82–98)
MONOCYTES # BLD AUTO: 0.43 THOUSAND/ÂΜL (ref 0.17–1.22)
MONOCYTES NFR BLD AUTO: 8 % (ref 4–12)
NEUTROPHILS # BLD AUTO: 3.65 THOUSANDS/ÂΜL (ref 1.85–7.62)
NEUTS SEG NFR BLD AUTO: 63 % (ref 43–75)
NITRITE UR QL STRIP: NEGATIVE
NONHDLC SERPL-MCNC: 112 MG/DL
NRBC BLD AUTO-RTO: 0 /100 WBCS
PH UR STRIP.AUTO: 7 [PH]
PLATELET # BLD AUTO: 315 THOUSANDS/UL (ref 149–390)
PMV BLD AUTO: 12.4 FL (ref 8.9–12.7)
POTASSIUM SERPL-SCNC: 4.3 MMOL/L (ref 3.5–5.3)
PROT SERPL-MCNC: 7 G/DL (ref 6.4–8.4)
PROT UR STRIP-MCNC: NEGATIVE MG/DL
RBC # BLD AUTO: 4.85 MILLION/UL (ref 3.81–5.12)
SODIUM SERPL-SCNC: 141 MMOL/L (ref 135–147)
SP GR UR STRIP.AUTO: 1.01 (ref 1–1.03)
TRIGL SERPL-MCNC: 112 MG/DL
UROBILINOGEN UR STRIP-ACNC: <2 MG/DL
WBC # BLD AUTO: 5.73 THOUSAND/UL (ref 4.31–10.16)

## 2023-02-16 ENCOUNTER — OFFICE VISIT (OUTPATIENT)
Dept: INTERNAL MEDICINE CLINIC | Facility: CLINIC | Age: 63
End: 2023-02-16

## 2023-02-16 VITALS
BODY MASS INDEX: 35.3 KG/M2 | HEART RATE: 83 BPM | WEIGHT: 199.2 LBS | HEIGHT: 63 IN | DIASTOLIC BLOOD PRESSURE: 76 MMHG | TEMPERATURE: 97.9 F | SYSTOLIC BLOOD PRESSURE: 122 MMHG | OXYGEN SATURATION: 95 %

## 2023-02-16 DIAGNOSIS — M19.90 ARTHRITIS: ICD-10-CM

## 2023-02-16 DIAGNOSIS — M81.6 LOCALIZED OSTEOPOROSIS WITHOUT CURRENT PATHOLOGICAL FRACTURE: ICD-10-CM

## 2023-02-16 DIAGNOSIS — E55.9 VITAMIN D DEFICIENCY: ICD-10-CM

## 2023-02-16 DIAGNOSIS — Z71.89 ENCOUNTER FOR CARDIAC RISK COUNSELING: Primary | ICD-10-CM

## 2023-02-16 DIAGNOSIS — E74.39 GLUCOSE INTOLERANCE: ICD-10-CM

## 2023-02-16 DIAGNOSIS — E78.1 HYPERTRIGLYCERIDEMIA: ICD-10-CM

## 2023-02-16 DIAGNOSIS — E78.5 HYPERLIPIDEMIA, UNSPECIFIED HYPERLIPIDEMIA TYPE: ICD-10-CM

## 2023-02-16 RX ORDER — SODIUM FLUORIDE1.1%, POTASSIUM NITRATE 5% 5.8; 57.5 MG/ML; MG/ML
GEL, DENTIFRICE DENTAL
COMMUNITY
Start: 2022-12-20 | End: 2023-02-16

## 2023-02-16 NOTE — ASSESSMENT & PLAN NOTE
Glucose at top normal value recommend continued care and consumption of carbohydrates and concentrated sugars regular exercise advised and reduction of body mass index also advised

## 2023-02-16 NOTE — ASSESSMENT & PLAN NOTE
The patient's lipid profile shows that triglycerides have improved nicely and are now back to normal range  Recommend continued care and consumption of concentrated sugars and carbohydrates  Continue regular exercise program and reduce body mass index

## 2023-02-16 NOTE — ASSESSMENT & PLAN NOTE
Arthritic changes of the cervical spine noted on examination  Recommend use of turmeric for anti-inflammatory effect as needed use of nonsteroidal anti-inflammatories can also be considered

## 2023-02-16 NOTE — ASSESSMENT & PLAN NOTE
Improvement noted in total cholesterol and LDL values recommend continuation of a controlled consumption of saturated fats  Continue regular exercise and reduce body mass index

## 2023-02-16 NOTE — PROGRESS NOTES
Name: Te Diaz      : 1960      MRN: [id number]  Encounter Provider: Zakia Piper MD  Encounter Date: 2023   Encounter department: [address]     1  Encounter for cardiac risk counseling  -     CT coronary calcium score; Future; Expected date: 2023    2  Localized osteoporosis without current pathological fracture  -     DXA bone density spine hip and pelvis; Future; Expected date: 2023    3  Hyperlipidemia, unspecified hyperlipidemia type  Assessment & Plan:  Improvement noted in total cholesterol and LDL values recommend continuation of a controlled consumption of saturated fats  Continue regular exercise and reduce body mass index  4  Glucose intolerance  Assessment & Plan:  Glucose at top normal value recommend continued care and consumption of carbohydrates and concentrated sugars regular exercise advised and reduction of body mass index also advised      5  Hypertriglyceridemia  Assessment & Plan:  The patient's lipid profile shows that triglycerides have improved nicely and are now back to normal range  Recommend continued care and consumption of concentrated sugars and carbohydrates  Continue regular exercise program and reduce body mass index  6  Vitamin D deficiency  Assessment & Plan:  Prior vitamin D deficiency has been corrected patient is on 5000 units of vitamin D daily we will continue this dose      7  Arthritis  Assessment & Plan:  Arthritic changes of the cervical spine noted on examination  Recommend use of turmeric for anti-inflammatory effect as needed use of nonsteroidal anti-inflammatories can also be considered  BMI Counseling: Body mass index is 35 29 kg/m²   The BMI is above normal  Nutrition recommendations include decreasing portion sizes, encouraging healthy choices of fruits and vegetables, moderation in carbohydrate intake, increasing intake of lean protein and reducing intake of cholesterol  Exercise recommendations include exercising 3-5 times per week  Rationale for BMI follow-up plan is due to patient being overweight or obese  Depression Screening and Follow-up Plan: Patient was screened for depression during today's encounter  They screened negative with a PHQ-2 score of 0  Subjective      This 68-year-old female patient returns to our office today for an annual physical examination and review of her comprehensive blood work  She has been exercising regularly trying to walk 2 miles on her treadmill at least 6 days a week  She has been maintaining a healthy balanced diet  She has been experiencing some mild arthritic discomfort in her neck but otherwise is doing well in general   She has had no cardiac symptoms of chest pain palpitations or shortness of breath we did discuss doing a CT scan of the heart for calcium score for heart risk assessment  Patient did have COVID this past summer did fine recuperating with no long-term's consequences of the infection  Review of Systems   Musculoskeletal: Positive for arthralgias  All other systems reviewed and are negative        Current Outpatient Medications on File Prior to Visit   Medication Sig   • azelaic acid (AZELEX) 20 % cream Apply topically 2 (two) times a day (Patient taking differently: Apply topically in the morning)   • Calcium Citrate (CALCITRATE PO) Take by mouth   • Cholecalciferol (Vitamin D3) 125 MCG (5000 UT) TBDP Take by mouth daily   • clindamycin (CLEOCIN T) 1 % external solution Apply topically as needed for flare ups   • econazole nitrate 1 % cream APPLY TOPICALLY 2 TIMES A DAY AS NEEDED FOR IRRITATION   • estradiol (VIVELLE-DOT) 0 05 MG/24HR Place 1 patch on the skin 2 (two) times a week   • Fluocinolone-Emollient (Synalar, Ointment,) 0 025 % KIT Apply topically 2 (two) times a day   • SF 5000 Plus 1 1 % CREA    • [DISCONTINUED] fluocinolone (SYNALAR) 0 025 % ointment  (Patient not taking: Reported on 10/19/2022)   • [DISCONTINUED] Sodium Fluoride 5000 Sensitive 1 1-5 % GEL  (Patient not taking: Reported on 2/16/2023)       Objective     /76   Pulse 83   Temp 97 9 °F (36 6 °C)   Ht 5' 3" (1 6 m)   Wt 90 4 kg (199 lb 3 2 oz)   LMP 10/03/2016 (Approximate)   SpO2 95%   BMI 35 29 kg/m²     Physical Exam  Vitals reviewed  Constitutional:       General: She is not in acute distress  Appearance: Normal appearance  She is well-developed  She is not ill-appearing  HENT:      Head: Normocephalic  Right Ear: Hearing, tympanic membrane, ear canal and external ear normal       Left Ear: Hearing, tympanic membrane, ear canal and external ear normal       Nose: Congestion present  No mucosal edema  Mouth/Throat:      Mouth: Mucous membranes are moist       Pharynx: Oropharynx is clear  Uvula midline  Eyes:      General: Lids are normal          Right eye: No discharge  Left eye: No discharge  Extraocular Movements: Extraocular movements intact  Conjunctiva/sclera: Conjunctivae normal       Pupils: Pupils are equal, round, and reactive to light  Neck:      Thyroid: No thyromegaly  Vascular: No carotid bruit or JVD  Cardiovascular:      Rate and Rhythm: Normal rate and regular rhythm  Heart sounds: Normal heart sounds  No murmur heard  Pulmonary:      Effort: Pulmonary effort is normal  No respiratory distress  Breath sounds: Normal breath sounds  No wheezing, rhonchi or rales  Abdominal:      General: Bowel sounds are normal  There is no distension  Palpations: Abdomen is soft  There is no mass  Tenderness: There is no abdominal tenderness  There is no guarding  Musculoskeletal:         General: Normal range of motion  Cervical back: Normal range of motion and neck supple  No rigidity or tenderness  Right lower leg: No edema  Left lower leg: No edema  Lymphadenopathy:      Cervical: No cervical adenopathy     Skin:     General: Skin is warm and dry  Coloration: Skin is not jaundiced or pale  Neurological:      General: No focal deficit present  Mental Status: She is alert and oriented to person, place, and time  Deep Tendon Reflexes: Reflexes are normal and symmetric  Reflexes normal    Psychiatric:         Mood and Affect: Mood normal          Speech: Speech normal          Behavior: Behavior normal  Behavior is cooperative  Thought Content:  Thought content normal          Judgment: Judgment normal        Jenni Pollard MD

## 2023-02-16 NOTE — ASSESSMENT & PLAN NOTE
Prior vitamin D deficiency has been corrected patient is on 5000 units of vitamin D daily we will continue this dose

## 2023-03-06 ENCOUNTER — HOSPITAL ENCOUNTER (OUTPATIENT)
Dept: RADIOLOGY | Facility: HOSPITAL | Age: 63
Discharge: HOME | End: 2023-03-06
Attending: INTERNAL MEDICINE

## 2023-03-06 DIAGNOSIS — Z71.89 ENCOUNTER FOR CARDIAC RISK COUNSELING: ICD-10-CM

## 2023-03-08 ENCOUNTER — HOSPITAL ENCOUNTER (OUTPATIENT)
Dept: RADIOLOGY | Facility: HOSPITAL | Age: 63
Discharge: HOME | End: 2023-03-08
Attending: ORTHOPAEDIC SURGERY

## 2023-03-08 ENCOUNTER — OFFICE VISIT (OUTPATIENT)
Dept: OBGYN CLINIC | Facility: HOSPITAL | Age: 63
End: 2023-03-08

## 2023-03-08 VITALS
WEIGHT: 203 LBS | SYSTOLIC BLOOD PRESSURE: 118 MMHG | HEART RATE: 76 BPM | BODY MASS INDEX: 35.97 KG/M2 | DIASTOLIC BLOOD PRESSURE: 77 MMHG | HEIGHT: 63 IN

## 2023-03-08 DIAGNOSIS — M79.604 PAIN OF RIGHT LOWER EXTREMITY: ICD-10-CM

## 2023-03-08 DIAGNOSIS — R16.0 LIVER MASS: ICD-10-CM

## 2023-03-08 DIAGNOSIS — M79.604 PAIN OF RIGHT LOWER EXTREMITY: Primary | ICD-10-CM

## 2023-03-08 RX ORDER — SODIUM FLUORIDE1.1%, POTASSIUM NITRATE 5% 5.8; 57.5 MG/ML; MG/ML
GEL, DENTIFRICE DENTAL
COMMUNITY
Start: 2023-02-23

## 2023-03-08 RX ORDER — MELOXICAM 15 MG/1
15 TABLET ORAL DAILY
Qty: 10 TABLET | Refills: 0 | Status: SHIPPED | OUTPATIENT
Start: 2023-03-08

## 2023-03-08 NOTE — PROGRESS NOTES
Assessment:   Diagnosis ICD-10-CM Associated Orders   1  Pain of right lower extremity  M79 604 Ambulatory Referral to Orthopedic Surgery     XR tibia fibula 2 vw right          Plan:  Physical exam performed  New plain films obtained  We discussed the results  Pt was advised to rest, no treadmill and I will prescribe her Mobic for 7-10 day  Pt should monitor her symptoms  If she is feeling improvement, she may return slowly to increase activity  If her symptoms fail to resolve or worsen, we can possibly order an MRI for further diagnostic study    Pt understood and had no further questions    To do next visit:  Return if symptoms worsen or fail to improve  The above stated was discussed in layman's terms and the patient expressed understanding  All questions were answered to the patient's satisfaction  Scribe Attestation    I,:  Mary Beth Rhodes am acting as a scribe while in the presence of the attending physician :       I,:  Negro Ware MD personally performed the services described in this documentation    as scribed in my presence :             Subjective:   Tomeka Wilcox is a 57 y o  female who presents today for an evaluation anterior foot and ankle pain  Pt states she was walking on a treadmill and has been increased the elevation  She reports for the last 2 weeks she has noticed pain on the dorsum of her foot that is slowly moving in to her distal shin  She also has some mild swelling  She has pain mostly at night with foot flexion and extension  She has taken the occasional NSAID but has not sought treatment  She denies trauma or injury    Review of systems negative unless otherwise specified in HPI  Review of Systems   Constitutional: Negative for chills and fever  HENT: Negative for ear pain and sore throat  Eyes: Negative for pain and visual disturbance  Respiratory: Negative for cough and shortness of breath  Cardiovascular: Negative for chest pain and palpitations  Gastrointestinal: Negative for abdominal pain and vomiting  Genitourinary: Negative for dysuria and hematuria  Musculoskeletal: Negative for arthralgias and back pain  Skin: Negative for color change and rash  Neurological: Negative for seizures and syncope  All other systems reviewed and are negative        Past Medical History:   Diagnosis Date   • Acne 1970   • Allergic Per chart   • Arthritis    • Bunion    • Callus    • Disease of thyroid gland    • Gout    • Hammer toe    • High arches    • Ingrown toenail    • Left knee injury     grade 1 injury of medial collateral ligament of left knee, sprain  of MCL joint   • Lichen sclerosus    • Menometrorrhagia    • Metrorrhagia    • Mitral valve prolapse syndrome     leaflet syndrome   • Osteopenia    • Plantar fasciitis    • Rosacea    • Rosacea    • Thyroid nodule     Nontoxic single   • Tinea pedis        Past Surgical History:   Procedure Laterality Date   •  SECTION, LOW TRANSVERSE     • COLONOSCOPY     • CYSTOSCOPY N/A 2018    Procedure: CYSTOSCOPY;  Surgeon: Trey Espino MD;  Location: [facility];  Service: Gynecology Oncology   • DILATION AND CURETTAGE OF UTERUS     • FRACTURE SURGERY Right     right wrist    • HYSTERECTOMY      @ age 59   • OOPHORECTOMY Bilateral 2018   • POLYPECTOMY N/A 10/18/2016    Procedure: POLYPECTOMY;  Surgeon: Malissa Davis DO;  Location: [facility];  Service:    • ME HYSTEROSCOPY BX ENDOMETRIUM&/POLYPC W/WO D&C N/A 10/18/2016    Procedure: DILATATION AND CURETTAGE (D&C) WITH HYSTEROSCOPY;  Surgeon: Malissa Davis DO;  Location[facility];  Service: Gynecology   • ME LAPS TOTAL HYSTERECT 250 GM/< W/RMVL TUBE/OVARY N/A 2018    Procedure: ROBOTIC HYSTERECTOMY, BSO ;  Surgeon: Trey Espino MD;  Location: [facility];  Service: Gynecology Oncology       Family History   Problem Relation Age of Onset   • Diabetes Mother    • Heart disease Mother    • Stroke Mother    • Thyroid disease Mother    • Diabetes Father    • Heart disease Father    • Hypertension Family    • Uterine cancer Sister    • Thyroid cancer Sister    • Endometrial cancer Sister 36   • Prostate cancer Brother 58   • No Known Problems Brother    • Allergies Sister    • No Known Problems Brother    • Lionel syndrome Sister    • Neurodegenerative disease Sister    • Ovarian cancer Paternal Aunt 79   • No Known Problems Daughter    • No Known Problems Maternal Grandmother    • No Known Problems Maternal Grandfather    • No Known Problems Paternal Grandmother    • No Known Problems Paternal Grandfather    • No Known Problems Maternal Aunt    • No Known Problems Maternal Aunt    • No Known Problems Maternal Aunt    • No Known Problems Maternal Aunt    • No Known Problems Maternal Aunt    • No Known Problems Paternal Aunt    • No Known Problems Paternal Aunt        Social History     Occupational History   • Occupation: [employer]   Tobacco Use   • Smoking status: Former     Packs/day: 0 50     Years: 20 00     Pack years: 10 00     Types: Cigarettes     Quit date: 2000     Years since quittin 1   • Smokeless tobacco: Never   • Tobacco comments:     Quit   Vaping Use   • Vaping Use: Never used   Substance and Sexual Activity   • Alcohol use:  Yes     Alcohol/week: 2 0 standard drinks     Types: 1 Glasses of wine, 1 Standard drinks or equivalent per week   • Drug use: No   • Sexual activity: Yes     Partners: Male     Birth control/protection: Post-menopausal, Surgical     Comment:          Current Outpatient Medications:   •  azelaic acid (AZELEX) 20 % cream, Apply topically 2 (two) times a day (Patient taking differently: Apply topically in the morning), Disp: 50 g, Rfl: 0  •  Calcium Citrate (CALCITRATE PO), Take by mouth, Disp: , Rfl:   •  Cholecalciferol (Vitamin D3) 125 MCG (5000 UT) TBDP, Take by mouth daily, Disp: , Rfl:   •  clindamycin (CLEOCIN T) 1 % external solution, Apply topically as needed for flare ups, Disp: 30 mL, Rfl: 0  •  econazole nitrate 1 % cream, APPLY TOPICALLY 2 TIMES A DAY AS NEEDED FOR IRRITATION, Disp: 30 g, Rfl: 1  •  estradiol (VIVELLE-DOT) 0 05 MG/24HR, Place 1 patch on the skin 2 (two) times a week, Disp: 24 patch, Rfl: 3  •  Fluocinolone-Emollient (Synalar, Ointment,) 0 025 % KIT, Apply topically 2 (two) times a day, Disp: 60 g, Rfl: 1  •  SF 5000 Plus 1 1 % CREA, , Disp: , Rfl:   •  Sodium Fluoride 5000 Sensitive 1 1-5 % GEL, , Disp: , Rfl:     Allergies   Allergen Reactions   • Ciprofloxacin Hcl Rash   • Erythromycin Rash     Powder/Suspension   • Flagyl [Metronidazole] Rash   • Bactrim [Sulfamethoxazole-Trimethoprim] Rash   • Cat Hair Extract Allergic Rhinitis   • Dust Mite Extract Allergic Rhinitis   • Wound Dressing Adhesive Rash          Vitals:    03/08/23 1500   BP: 118/77   Pulse: 76       Objective:                    Right Ankle Exam   Swelling: mild    Range of Motion   Dorsiflexion: normal   Plantar flexion: normal   Eversion: normal   Inversion: normal     Other   Erythema: absent  Sensation: normal  Pulse: present     Comments:  Tender to palpation medal and lateral distal tibial crest  Ankle ROM normal with mild pain noted DF   Neg Effusion            Diagnostics, reviewed and taken today if performed as documented: The attending physician has personally reviewed the pertinent films in PACS and interpretation is as follows:       No acute fractures        Procedures, if performed today:    Procedures    None performed     Portions of the record may have been created with voice recognition software  Occasional wrong word or "sound a like" substitutions may have occurred due to the inherent limitations of voice recognition software  Read the chart carefully and recognize, using context, where substitutions have occurred

## 2023-03-14 ENCOUNTER — HOSPITAL ENCOUNTER (OUTPATIENT)
Dept: RADIOLOGY | Facility: HOSPITAL | Age: 63
Discharge: HOME | End: 2023-03-14
Attending: INTERNAL MEDICINE

## 2023-03-14 DIAGNOSIS — R16.0 LIVER MASS: ICD-10-CM

## 2023-03-14 RX ADMIN — GADOBUTROL 9 ML: 604.72 INJECTION INTRAVENOUS at 16:10

## 2023-03-28 ENCOUNTER — OFFICE VISIT (OUTPATIENT)
Dept: OBGYN CLINIC | Facility: CLINIC | Age: 63
End: 2023-03-28

## 2023-03-28 ENCOUNTER — APPOINTMENT (OUTPATIENT)
Dept: RADIOLOGY | Facility: AMBULARY SURGERY CENTER | Age: 63
End: 2023-03-28
Attending: ORTHOPAEDIC SURGERY

## 2023-03-28 VITALS
SYSTOLIC BLOOD PRESSURE: 115 MMHG | HEART RATE: 63 BPM | WEIGHT: 203 LBS | DIASTOLIC BLOOD PRESSURE: 73 MMHG | HEIGHT: 63 IN | BODY MASS INDEX: 35.97 KG/M2

## 2023-03-28 DIAGNOSIS — M79.672 PAIN IN LEFT FOOT: ICD-10-CM

## 2023-03-28 DIAGNOSIS — Z01.89 ENCOUNTER FOR LOWER EXTREMITY COMPARISON IMAGING STUDY: ICD-10-CM

## 2023-03-28 DIAGNOSIS — S93.105A: ICD-10-CM

## 2023-03-28 DIAGNOSIS — M20.42 HAMMERTOE OF SECOND TOE OF LEFT FOOT: ICD-10-CM

## 2023-03-28 DIAGNOSIS — M20.12 HALLUX VALGUS OF LEFT FOOT: Primary | ICD-10-CM

## 2023-03-28 NOTE — PATIENT INSTRUCTIONS
Bunions       If the joint that connects your big toe to your foot has a swollen, sore bump, you may have a bunion  More than one-third of women in [address] [place] have bunions, a common deformity often blamed on wearing tight, narrow shoes and high heels  Bunions may occur in families, but many are from wearing tight shoes, and nine out of 10 bunions happen to women  Too-tight shoes can also cause other disabling foot problems such as corns, calluses and hammer toes  With a bunion, the base of your big toe (metatarsophalangeal joint) gets larger and sticks out  The skin over it may be red and tender, and wearing any type of shoe may be painful  This joint flexes with every step you take, so the bigger your bunion gets, the more it hurts to walk  Bursitis (painful swelling with inflammation) may set in  Your big toe may angle toward your second toe or move all the way under it  In addition, the skin on the bottom of your foot may become thicker and painful  Pressure from your big toe may force your second toe out of alignment, sometimes overlapping your third toe or the big toe  An advanced bunion may make your foot look deformed  If your bunion gets too severe, it may be difficult to walk  Your pain may become chronic and you may develop arthritis  Relief from Bunions  Most bunions are treatable without surgery  Prevention is always best  To minimize your chances of developing a bunion, never force your foot into a tight shoe that crowds your toes or doesn't fit  Choose shoes that conform to the shape of your feet  Go for shoes with wide insteps, broad toes and soft soles  Avoid shoes that are short, tight or sharply pointed, and those with heels higher than 2 1/4 inches  If you already have a bunion, wear shoes that are roomy enough to not put pressure on the big toe  This should relieve most of your pain  You may want to have your shoes stretched out professionally   You may also try protective pads to cushion "the painful area  A silicone gel toe spacer (type this into [employer] to purchase these  Pictured below) can be helpful and relieve some discomfort  If your bunion has progressed to the point where you have difficulty walking or experience pain despite accommodative shoes, you may need surgery  Bunion surgery realigns bone, ligaments, tendons and nerves so your big toe can be brought back to its correct position  Orthopaedic surgeons have several techniques to ease your pain  Many bunion surgeries are done on a same-day basis (no hospital stay) using an ankle-block anesthesia  Recovery usually occurs over a three- to six-month period and may include persistent swelling and stiffness  Adolescent [de-identified]   Your young teenager (especially girls age 8 to 13) may develop an \"adolescent\" bunion at the base of his or her big toe  Unlike adults with bunions, a young person can normally move the affected joint well  Your teenager may have pain and trouble wearing shoes  Try having your child's shoes stretched and/or getting wider shoes  Surgery to remove an adolescent bunion is not recommended unless your child is in extreme pain and the problem does not get better with changes in shoe wear  If your adolescent has bunion surgery, particularly before he or she is fully grown, there is a strong chance the problem will return  Often surgeons will not perform surgery on teenagers until the growth plate matures  Bunionette  If you have a painful swollen lump on the outside of your foot near the base of your little toe, it may be a bunionette (\"tailor's bunion\")  You may also have a hard corn and painful bursitis in the same spot  Similar to a bunion, bunionettes may be caused by wearing shoes that are too tight  Get shoes that fit comfortably with a soft upper and a roomy toe box  In cases of persistent pain or severe deformity, surgical correction may be necessary          "

## 2023-03-28 NOTE — PROGRESS NOTES
DANNA Christianson  Attending, Orthopaedic Surgery  [facility] [address] [facility]      ORTHOPAEDIC FOOT AND ANKLE CLINIC VISIT         Assessment:     Encounter Diagnoses   Name Primary? • Hallux valgus of left foot Yes   • Hammertoe of second toe of left foot    • Dislocation of second MTP joint, left, initial encounter    • Encounter for lower extremity comparison imaging study            Plan:      Discussed general issues surrounding bunions along with the advantages and disadvantages of various tx strategies  Educational handouts provided  Discussed the pros and cons of multiple different surgeries  Patient has no pain about her bunion  Most of her pain is localized about the dislocated 2nd MTP joint  It was explained to her that if she only has pain about her 2nd toe then an amputation could eliminate the 2nd toe pain and be the easiest recovery      If she wishes to have her bunion corrected as well then her options are to have a lapiplasty surgery along with a hammertoe correction, if she wishes to try and keep her 2nd toe her chance for re dislocation is significantly high after hammertoe correction  FU with me PRN  Surgical and conservative management discussed today  Patient Education: All questions were answered  The patient understands exam findings and treatment plan  Treatment options discussed at length with patient  Subjective    Chief Complaint:  Pain of the Left Foot       Sierra Etienne is a 57 y o  female referred by Dr Yang Duncan on 3/8/23 for evaluation and treatment of left bunions  Symptoms began several years ago  Notes progressive deformity of the left great toe  Notes excessive wear on shoes  Symptoms do not have impact on normal day to day activities  Symptoms have been basically asymptomatic  Treatment to date: trial of new shoes, which have been not very effective  Pain is rated as 2/10     Pain/symptom location: the left foot  Pain/symptom severity: intermittent, soreness with certain shoe wear  Pain/symptom timing:  Worse during the day when active  Pain/symptom conext:  Worse with activites and work  Pain/symptom modifying factors:  Rest makes better, activities make worse  Pain/symptom associated signs/symptoms: none    Outside reports reviewed: none  This is the first time I am seeing this patient with this new problem  Foot and Ankle Surgical History:   See below    Past Medical, Surgical and Social History:  Past Medical History:  has a past medical history of Acne (1970), Allergic (Per chart), Arthritis, Bunion, Callus, Disease of thyroid gland, Gout, Hammer toe, High arches, Ingrown toenail, Left knee injury, Lichen sclerosus, Menometrorrhagia, Metrorrhagia, Mitral valve prolapse syndrome, Osteopenia, Plantar fasciitis, Rosacea, Rosacea, Thyroid nodule, and Tinea pedis  Problem List: does not have any pertinent problems on file  Past Surgical History:  has a past surgical history that includes  section, low transverse; Colonoscopy; Fracture surgery (Right); pr hysteroscopy bx endometrium&/polypc w/wo d&c (N/A, 10/18/2016); Polypectomy (N/A, 10/18/2016); pr laps total hysterect 250 gm/< w/rmvl tube/ovary (N/A, 2018); CYSTOSCOPY (N/A, 2018); Dilation and curettage of uterus; Hysterectomy (); and Oophorectomy (Bilateral, 2018)  Family History: family history includes Allergies in her sister; Diabetes in her father and mother; Endometrial cancer (age of onset: 36) in her sister; Heart disease in her father and mother; Hypertension in her family; Neurodegenerative disease in her sister;  No Known Problems in her brother, brother, daughter, maternal aunt, maternal aunt, maternal aunt, maternal aunt, maternal aunt, maternal grandfather, maternal grandmother, paternal aunt, paternal aunt, paternal grandfather, and paternal grandmother; Ovarian cancer (age of onset: 79) in her paternal aunt; Prostate cancer (age of "onset: 58) in her brother; Stroke in her mother; Thyroid cancer in her sister; Thyroid disease in her mother; Uterine cancer in her sister; Lionel syndrome in her sister  Social History:  reports that she quit smoking about 23 years ago  Her smoking use included cigarettes  She has a 10 00 pack-year smoking history  She has never used smokeless tobacco  She reports current alcohol use of about 2 0 standard drinks per week  She reports that she does not use drugs  Current Medications: has a current medication list which includes the following prescription(s): azelaic acid, calcium citrate, vitamin d3, clindamycin, econazole nitrate, estradiol, synalar (ointment), meloxicam, sf 5000 plus, and sodium fluoride 5000 sensitive  Allergies: is allergic to ciprofloxacin hcl, erythromycin, flagyl [metronidazole], bactrim [sulfamethoxazole-trimethoprim], cat hair extract, dust mite extract, and wound dressing adhesive  Review of Systems:  A comprehensive 14 point ROS was performed, reviewed, and the pertinent orthopaedic findings are documented in the HPI  Objective:   /73 (BP Location: Left arm, Patient Position: Sitting, Cuff Size: Adult)   Pulse 63   Ht 5' 3\" (1 6 m)   Wt 92 1 kg (203 lb)   LMP 10/03/2016 (Approximate)   BMI 35 96 kg/m²   General/Constitutional: No apparent distress: well-nourished and well developed  Eyes: normal ocular motion  Lymphatic: No appreciable lymphadenopathy  Respiratory: Non-labored breathing  Vascular: No edema, swelling or tenderness, except as noted in detailed exam   Integumentary: No impressive skin lesions present, except as noted in detailed exam   Neuro: No ataxia or abnormal movements noted  Psych: Normal mood and affect, oriented to person, place and time  Musculoskeletal: Normal other than stated in HPI and exam               Gait:  Non antalgic      Left Foot  Hammertoe deformity Severity = severe   Hallux deformity:   Severity = severe   ROM:   mildly " limited   Inflammation:   none   Tenderness:  over 2nd toe  No TTP over bunion   Passive ROM:   50% of normal    Sensation:    normal   Pulses:  normal DP and PT pulses       Imaging: 3 views taken and independently reviewed today which reveals HVA-47, ALEX-18, and moderate coronal plane rotation/pronation  Dislocation of 2nd MTP joint  Reviewed by me personally           Scribe Attestation    I,:  Mora Pizarro am acting as a scribe while in the presence of the attending physician :       I,:  Preston Gaming MD personally performed the services described in this documentation    as scribed in my presence :

## 2023-04-17 DIAGNOSIS — R73.09 ABNORMAL GLUCOSE: Primary | ICD-10-CM

## 2023-04-17 DIAGNOSIS — E78.5 HYPERLIPIDEMIA, UNSPECIFIED HYPERLIPIDEMIA TYPE: ICD-10-CM

## 2023-04-25 ENCOUNTER — OFFICE VISIT (OUTPATIENT)
Dept: PHYSICAL THERAPY | Facility: OTHER | Age: 63
End: 2023-04-25

## 2023-04-25 DIAGNOSIS — M20.11 HALLUX VALGUS OF RIGHT FOOT: ICD-10-CM

## 2023-04-25 DIAGNOSIS — M20.12 HALLUX VALGUS OF LEFT FOOT: Primary | ICD-10-CM

## 2023-04-25 DIAGNOSIS — M77.8 FLEXOR HALLUCIS LONGUS TENDINITIS: ICD-10-CM

## 2023-04-25 NOTE — PROGRESS NOTES
"Daily Note     Today's date: 2023  Patient name: Carmencita Kay  : 1960  MRN: [id number]  Referring provider: No ref  provider found  Dx:   Encounter Diagnosis     ICD-10-CM    1  Hallux valgus of left foot  M20 12       2  Hallux valgus of right foot  M20 11       3  Flexor hallucis longus tendinitis  M77 8           Start Time:   Stop Time:   Total time in clinic (min): 55 minutes  1 on 1 from 450-525, 530-545, not billed remainder    Subjective: Patient reports she feels as though her toe mobility and forefoot has become more flexible since her initial evaluation and initiation of her home exercise program  Reports compliance with home program as instructed  At this time, she does feel she may be overdoing the plantar fascia pain as her ankle pain seems to be a little worse  Objective: See treatment diary below      Assessment: Tolerated treatment well  Patient demonstrated fatigue post treatment, exhibited good technique with therapeutic exercises and would benefit from continued PT  Added joint mobilizations of the foot and ankel to further address mobility deficits  Patient demonstrating significant improvement in mobility and ability to activate intrinsics this visit  Plan to progress intrinsic activation as tolerated over future visit  Patient pinpoints soreness to anterior ankle and medial lower leg  Discussed decreasing plantar fascia stretch to 2x and holding on posterior tib calf raise  Plan: Continue per plan of care        Precautions: HV, 2nd MT dislocation, tendinitis      Manuals            EPAT, FHL, ant tib, post tib 2000p 1 8 21Hz, ceramx 2000p 1 8 21Hz, ceramx           Forefoot mobs  EB gr 4           TCJ mob post  EB gr 4           Subtalar med/lat mob  EB gr 3/4 med/lat           Midfoot mobs             HAV correction tape  EB           Navicular sling tape  EB           Neuro Re-Ed             Short foot 10 x 5\" 10 x 5\"           Toe yoga 10 x 5\" 10 x " "5\"           Toe splay 10 x 5\" 10 x 5\"           GT abd w/ ball 10 x 5\" 10 x 5\"           Seated Post tib calf raise 3 x 10  3 x 10                                     Ther Ex             Plantar fascia towel stretch 4 x 30\" 2 x 30\"                                                                                                      Ther Activity                                       Gait Training                                       Modalities                                            "

## 2023-05-02 ENCOUNTER — OFFICE VISIT (OUTPATIENT)
Dept: PHYSICAL THERAPY | Facility: OTHER | Age: 63
End: 2023-05-02

## 2023-05-02 DIAGNOSIS — M20.12 HALLUX VALGUS OF LEFT FOOT: Primary | ICD-10-CM

## 2023-05-02 DIAGNOSIS — M20.11 HALLUX VALGUS OF RIGHT FOOT: ICD-10-CM

## 2023-05-02 DIAGNOSIS — M77.8 FLEXOR HALLUCIS LONGUS TENDINITIS: ICD-10-CM

## 2023-05-02 NOTE — PROGRESS NOTES
"Daily Note     Today's date: 5/3/2023  Patient name: Jayson Dunham  : 1960  MRN: [id number]  Referring provider: No ref  provider found  Dx:   Encounter Diagnosis     ICD-10-CM    1  Hallux valgus of left foot  M20 12       2  Hallux valgus of right foot  M20 11       3  Flexor hallucis longus tendinitis  M77 8           Start Time: 1700  Stop Time: 1800  Total time in clinic (min): 60 minutes  1 on 1 from 510-600, not billed remainder    Subjective: Patient reports gradual improvement in mobility since last visit  However, notes that the added tape last visit significantly improved her pain and she was able to take her first steps out of bed pain free while wearing the tape  Objective: See treatment diary below      Assessment: Tolerated treatment well  Patient demonstrated fatigue post treatment, exhibited good technique with therapeutic exercises and would benefit from continued PT  Notes continued anterior ankle pain and pinpoints to anterior tib  Continued with mobility program, EPAT and taping as patient noted significant relief of pain and improved function after last visit  Plan: Continue per plan of care        Precautions: HV, 2nd MT dislocation, tendinitis      Manuals  5          EPAT, FHL, ant tib, post tib 2000p 1 8 21Hz, ceramx 2000p 1 8 21Hz, ceramx 2000p 1 8 21Hz, ceramx          Forefoot mobs  EB gr 4 EB gr 4          TCJ mob post  EB gr 4 EB gr 4          Subtalar med/lat mob  EB gr 3/4 med/lat EB gr 3/4 med/lat          Midfoot mobs             HAV correction tape  EB EB          Navicular sling tape  EB EB          Neuro Re-Ed             Short foot 10 x 5\" 10 x 5\" 10 x 5\"          Toe yoga 10 x 5\" 10 x 5\" 10 x 5\"          Toe splay 10 x 5\" 10 x 5\" 10 x 5\"          GT abd w/ ball 10 x 5\" 10 x 5\"           Seated Post tib calf raise 3 x 10  3 x 10                                     Ther Ex             Plantar fascia towel stretch 4 x 30\" 2 x 30\"                      " Ther Activity                                       Gait Training                                       Modalities

## 2023-05-03 NOTE — PROGRESS NOTES
Custom orthotics fitted to patients shoe and dispensed  Patient educated on appropriate break in period  Patient will follow up if any future problems arise  Pair made for flats was a little high in the arch and tight on the forefoot  Discussed lowering arch and reducing posting to allow for better fit  Pair for sneakers fit well and noted improvement with initial steps

## 2023-05-09 ENCOUNTER — OFFICE VISIT (OUTPATIENT)
Dept: PHYSICAL THERAPY | Facility: OTHER | Age: 63
End: 2023-05-09

## 2023-05-09 ENCOUNTER — TELEPHONE (OUTPATIENT)
Dept: DIABETES SERVICES | Facility: CLINIC | Age: 63
End: 2023-05-09

## 2023-05-09 DIAGNOSIS — M77.8 FLEXOR HALLUCIS LONGUS TENDINITIS: ICD-10-CM

## 2023-05-09 DIAGNOSIS — M20.11 HALLUX VALGUS OF RIGHT FOOT: ICD-10-CM

## 2023-05-09 DIAGNOSIS — M20.12 HALLUX VALGUS OF LEFT FOOT: Primary | ICD-10-CM

## 2023-05-09 DIAGNOSIS — E66.9 OBESITY (BMI 30-39.9): ICD-10-CM

## 2023-05-09 DIAGNOSIS — E74.39 GLUCOSE INTOLERANCE: Primary | ICD-10-CM

## 2023-05-09 NOTE — PROGRESS NOTES
"Daily Note     Today's date: 2023  Patient name: Baltazar Gtz  : 1960  MRN: [id number]  Referring provider: No ref  provider found  Dx:   Encounter Diagnosis     ICD-10-CM    1  Hallux valgus of left foot  M20 12       2  Hallux valgus of right foot  M20 11       3  Flexor hallucis longus tendinitis  M77 8           Start Time: 8316  Stop Time: 1700  Total time in clinic (min): 45 minutes   on  for entirety    Subjective: Patient reports continued improvement in mobility of her toes  She reports she has returned to her baseline irritation of her \"flexors\"  Pinpoints soreness to anterior tibialis tendon,extensor digitorum tendons and anterior TCJ joint line  Objective: See treatment diary below      Assessment: Ignacio Brock has notable improvement in foot and ankle mobility and toe position  She demonstrates an improvement in foot posture  While Hallux Valgus is still present, the position of her forefoot has improved and she is able to sit with her toes separate in a resting position  She does continue with mild difficulty in isolation of intrinsic activation  However, she is demonstrating an improved ability to perform isometric holds and improved AAROM of toes  She reports good tolerance to orthotics dispensed last week  Today she tolerated treatment well  Added ISTM to extensor retinaculum and anterior tibialis tendon  Today we added a few reps of theraband ankle 4 way to assess tolerance of strengthening as patients tendon pain cotninues to improve  We also progressed intrinsic activation with short foot to SL for home program  Plan to add dynamic movement with potential cone  next visit if continued improvement in activation and pain levels  Plan: Continue per plan of care        Precautions: HV, 2nd MT dislocation, tendinitis      Manuals          EPAT, FHL, ant tib, post tib 2000p 1 8 21Hz, ceramx 2000p 1 8 21Hz, ceramx 2000p 1 8 21Hz, ceramx 2000p 1 8 21Hz, ceramx    " "     ISTM    Ant tib EB         Forefoot mobs  EB gr 4 EB gr 4 EB gr 4          TCJ mob post  EB gr 4 EB gr 4 EB gr 4         Subtalar med/lat mob  EB gr 3/4 med/lat EB gr 3/4 med/lat EB gr 3/4 med/lat         Midfoot mobs    EB gr 4         HAV correction tape  EB EB hold         Navicular sling tape  EB EB hold         Neuro Re-Ed             Short foot 10 x 5\" 10 x 5\" 10 x 5\" SL 10 x 5\" HEP update         Toe yoga 10 x 5\" 10 x 5\" 10 x 5\"          Toe splay 10 x 5\" 10 x 5\" 10 x 5\"          GT abd w/ ball 10 x 5\" 10 x 5\"           Seated Post tib calf raise 3 x 10  3 x 10                                     Ther Ex             Plantar fascia towel stretch 4 x 30\" 2 x 30\"           TB 4 way    10 ea OTB reviewed  Long sitting on table and in chair                                                                                       Ther Activity                                       Gait Training                                       Modalities                                            "

## 2023-05-09 NOTE — TELEPHONE ENCOUNTER
You referred pt to Nutrition but patient said she specifically wants to see the dietician Chrissy Barbosa Dress works for [employer]   If ok with you, please issue a referral for diabetes education

## 2023-05-15 ENCOUNTER — OFFICE VISIT (OUTPATIENT)
Dept: DIABETES SERVICES | Facility: CLINIC | Age: 63
End: 2023-05-15

## 2023-05-15 VITALS — BODY MASS INDEX: 36.85 KG/M2 | WEIGHT: 208 LBS

## 2023-05-15 DIAGNOSIS — E66.9 OBESITY (BMI 30-39.9): ICD-10-CM

## 2023-05-15 DIAGNOSIS — E74.39 GLUCOSE INTOLERANCE: Primary | ICD-10-CM

## 2023-05-15 NOTE — PATIENT INSTRUCTIONS
Consume 3 meals a day about 4-5 hours apart (no skipping)  30-45 grams of carbohydrates per meal and no more than 15 grams per snack  6 ounces of lean protein daily  4 added fats  150 minutes of aerobic exercise a week

## 2023-05-15 NOTE — PROGRESS NOTES
"Medical Nutrition Therapy        Assessment    Visit Type: Initial visit  Chief complaint/Medical Diagnosis/reason for visit Glucose intolerance    HPI Kavin Mack was seen today for her initial MNT visit  Kavin Mack reports struggling with her weight for years  She has tried many diets in the past  Patient states, \"I am good for about 4 weeks and then I have a harder time sticking with the diet  \" Problems identified in food recall include inconsistent carbohydrate intake at meals, meal skipping, and poorly timed meals  Provided patient with a 1400 calorie meal plan to assist with consistency, balance and portion control  Encouraged the consumption of regular meals at regular times 4-5 hours apart  Discouraged meal skipping  Advised patient to keep carbohydrate intake to 30-45 grams per meal and 15 grams per snack to assist with glycemic control  Suggested keeping protein intake to 6 ounces a day and fat to 4 servings daily to assist with lipid management and calorie control  Portion booklet and food labels were used to teach basic carbohydrate counting  Kavin Mack would benefit from regular aerobic exercise  Patient agreed to keep daily food logs  She will return in 6 weeks for follow-up  RD will remain available for further dietary questions/concerns       Ht Readings from Last 1 Encounters:   04/17/23 5' 3\" (1 6 m)     Wt Readings from Last 3 Encounters:   05/15/23 94 3 kg (208 lb)   03/28/23 92 1 kg (203 lb)   03/08/23 92 1 kg (203 lb)     Weight Change: Yes Fluctuztes between 10 pounds    Barriers to Learning: no barriers    Do you follow any special diet presently?: Yes - low carb  Who shops: spouse  Who cooks: spouse    Food Log: Completed via the method of food recall    Breakfast:8:30-9:30AM SKIPS 1-2 times a week; eggs and turkey sims OR buttered English muffin; coffee with plain creamer  Morning Snack:none  Lunch:11:30-12:30PM MAY SKIP ON WEEKEND: salad with chicken and light dressing OR occasionally soup, " water  Afternoon Snack: 2:00-4:00PM candy bar or other candy  Dinner:5:00-6:30PM 4-5 ounces protein, 3/4 cup rice or one cup pasta and veggies  (mainly non-starchy veggies) OR 1 5 cups pasta with meat sauce, water  Evening Snack:not usually (may have popcorn)  Beverages: coffee and water  Eating out/Take out:few dinners a week are consumed out and cafeteria daily for lunch  Exercise treadmill 30 minutes in the morning 4 days a week  Calorie needs 4490-1672 kcals/day Carbs: 30-45g/meal, 15g/snack     Fat: 4 servings/day    Protein:6 ounces/day    Nutrition Diagnosis:  Food and nutrition related knowledge deficit  related to Lack of prior exposure to accurate nutrition related information as evidenced by No prior knowledge of need for food and nutrition related recommendations    Intervention: label reading, behavior modification strategies, carbohydrate counting, increased plant based foods, meal timing, meal planning, individualized meal plan, exercise guidelines and food diary     Treatment Goals: Patient will monitor fat intake, Patient will consume 3 meals a day, Patient will count carbohydrates, Patient will exercise and Protein intake  Monitoring and evaluation:    Term code indicator  FH 1 3 2 Food Intake Criteria: Consume 3 meals a day about 4-5 hours apart (no skipping)  Term code indicator  FH 1 6 3 Carbohydrate Intake Criteria: 2 30-45 grams of carbohydrates per meal and no more than 15 grams per snack  Term code indicator  FH 1 6 2 Protein Intake Criteria: 3 6 ounces of lean protein daily  Term code indicator  FH 1 6 1 Fat and Cholesterol Intake Criteria: 4 added fats  Term code indicator  CH 2 2 Treatments/Therapy/Alternative Medicine Criteria: 150 minutes of aerobic exercise a week      Materials Provided: Portion booklet, food logs and meal plan    Patient’s Response to Instruction:  Comprehensiongood  Motivationgood  Expected Compliancegood    Start- Stop: 9:00-9:45  Total Minutes: 39 Minutes  Group or Individual Instruction: mnt-i  Other: Dr Marie Inch    Thank you for coming to the [address] [place] [address] for education today  Please feel free to call with any questions or concerns      Jono Kothari  [id number] [id number]

## 2023-05-16 ENCOUNTER — OFFICE VISIT (OUTPATIENT)
Dept: PHYSICAL THERAPY | Facility: OTHER | Age: 63
End: 2023-05-16

## 2023-05-16 DIAGNOSIS — M20.11 HALLUX VALGUS OF RIGHT FOOT: Primary | ICD-10-CM

## 2023-05-16 DIAGNOSIS — N89.8 VAGINAL DISCHARGE: Primary | ICD-10-CM

## 2023-05-16 DIAGNOSIS — M77.8 FLEXOR HALLUCIS LONGUS TENDINITIS: ICD-10-CM

## 2023-05-16 DIAGNOSIS — M20.12 HALLUX VALGUS OF LEFT FOOT: ICD-10-CM

## 2023-05-16 RX ORDER — FLUCONAZOLE 150 MG/1
TABLET ORAL
Qty: 2 TABLET | Refills: 0 | Status: SHIPPED | OUTPATIENT
Start: 2023-05-16 | End: 2023-05-19

## 2023-05-16 NOTE — PROGRESS NOTES
"Daily Note     Today's date: 2023  Patient name: Adam Alcocer  : 1960  MRN: [id number]  Referring provider: No ref  provider found  Dx:   Encounter Diagnosis     ICD-10-CM    1  Hallux valgus of right foot  M20 11       2  Hallux valgus of left foot  M20 12       3  Flexor hallucis longus tendinitis  M77 8           Start Time: 1700  Stop Time: 09  Total time in clinic (min): 45 minutes   on  for entirety    Subjective: Patient reports continued improvement in her tendonitis pain  Notes added ISTM last visit helped further reduce pain  Objective: See treatment diary below      Assessment: Josefina Lopez has notable improvement in foot and ankle mobility and toe position  Continued with similar mobility program, EPAT and ISTM  Today she tolerated treatment well  Discussed progression of theraband and SLS for short foot and intrinsic activation  Plan: Continue per plan of care        Precautions: HV, 2nd MT dislocation, tendinitis      Manuals         EPAT, FHL, ant tib, post tib 2000p 1 8 21Hz, ceramx 2000p 1 8 21Hz, ceramx 2000p 1 8 21Hz, ceramx 2000p 1 8 21Hz, ceramx 2000p 1 8 21Hz, ceramx        ISTM    Ant tib EB EB        Forefoot mobs  EB gr 4 EB gr 4 EB gr 4  EB gr 4        TCJ mob post  EB gr 4 EB gr 4 EB gr 4 EB gr 4        Subtalar med/lat mob  EB gr 3/4 med/lat EB gr 3/4 med/lat EB gr 3/4 med/lat EB gr 3/4 med/lat        Midfoot mobs    EB gr 4 EB gr 4        HAV correction tape  EB EB hold resumed EB        Navicular sling tape  EB EB hold D/c        Neuro Re-Ed             Short foot 10 x 5\" 10 x 5\" 10 x 5\" SL 10 x 5\" HEP update SL 10 x 5\" HEP update        Toe yoga 10 x 5\" 10 x 5\" 10 x 5\"          Toe splay 10 x 5\" 10 x 5\" 10 x 5\"          GT abd w/ ball 10 x 5\" 10 x 5\"           Seated Post tib calf raise 3 x 10  3 x 10                                     Ther Ex             Plantar fascia towel stretch 4 x 30\" 2 x 30\"           TB 4 way    10 ea OTB reviewed  " Long sitting on table and in chair GTB                                                                                      Ther Activity                                       Gait Training                                       Modalities

## 2023-05-16 NOTE — TELEPHONE ENCOUNTER
----- Message from Nilda Reynoso sent at 5/16/2023  6:54 AM EDT -----  Regarding: Vaginal infection  Contact: [phone]  Seven Wilson, hope all is well  I suspect I have a vaginal infection brewing…itching, mild odor, no drainage  Wondering if I can get a rx or if I need to come in ? Appreciate your thoughts   Candice Gambino
Laps, needles and instruments count was reported as correct.

## 2023-05-18 NOTE — PROGRESS NOTES
Custom orthotics fitted to patients shoe and dispensed  Patient had modifications made to insert for dress shoes to make more low-profile  Patient educated on appropriate break in period  Patient will follow up if any future problems arise

## 2023-05-22 DIAGNOSIS — L71.9 ROSACEA: Primary | ICD-10-CM

## 2023-05-22 DIAGNOSIS — L90.0 LICHEN SCLEROSUS: ICD-10-CM

## 2023-05-22 RX ORDER — AZELAIC ACID 0.15 G/G
1 GEL TOPICAL 2 TIMES DAILY
Qty: 50 G | Refills: 2 | Status: SHIPPED | OUTPATIENT
Start: 2023-05-22

## 2023-05-23 ENCOUNTER — APPOINTMENT (OUTPATIENT)
Dept: PHYSICAL THERAPY | Facility: OTHER | Age: 63
End: 2023-05-23

## 2023-05-23 DIAGNOSIS — M20.11 HALLUX VALGUS OF RIGHT FOOT: Primary | ICD-10-CM

## 2023-05-23 DIAGNOSIS — M20.12 HALLUX VALGUS OF LEFT FOOT: ICD-10-CM

## 2023-05-23 NOTE — PROGRESS NOTES
Daily Note     Today's date: 2023  Patient name: Kieran Abreu  : 1960  MRN: [id number]  Referring provider: No ref  provider found  Dx:   Encounter Diagnosis     ICD-10-CM    1  Hallux valgus of right foot  M20 11       2  Hallux valgus of left foot  M20 12           Start Time: 0730  Stop Time: 0815  Total time in clinic (min): 45 minutes  1 on 1 for entirety    Subjective: Patient reports b/l ankle soreness (pinpoints to b/l ant tib) States she feels she may be overdoing some of her exercises  Objective: See treatment diary below      Assessment: Kesha San has notable improvement in foot and ankle mobility and toe position  Her 2nd toe of her L foot was able to sit flush with the 2nd MTP after removing her shoe and actively splaying her toes  She no longer requires passive movement to place her 1st met in a neutral position Continued with similar mobility program, EPAT, ISTM, and taping  Today she tolerated treatment well  We reviewed form for 4 way TB  Patient did initially demonstrate combined dorsiflexion with inversion and eversion which maybe contributing to her increased extensor soreness  We reviewed isolation of eversion and inversion and discussed limiting reps and days for strengthening until this soreness decreases  Plan: Continue per plan of care        Precautions: HV, 2nd MT dislocation, tendinitis      Manuals        EPAT, FHL, ant tib, post tib 2000p 1 8 21Hz, ceramx 2000p 1 8 21Hz, ceramx 2000p 1 8 21Hz, ceramx 2000p 1 8 21Hz, ceramx 2000p 1 8 21Hz, ceramx        ISTM    Ant tib EB EB        Forefoot mobs  EB gr 4 EB gr 4 EB gr 4  EB gr 4        TCJ mob post  EB gr 4 EB gr 4 EB gr 4 EB gr 4        Subtalar med/lat mob  EB gr 3/4 med/lat EB gr 3/4 med/lat EB gr 3/4 med/lat EB gr 3/4 med/lat        Midfoot mobs    EB gr 4 EB gr 4        HAV correction tape  EB EB hold resumed EB        Navicular sling tape  EB EB hold D/c        Neuro Re-Ed "  Short foot 10 x 5\" 10 x 5\" 10 x 5\" SL 10 x 5\" HEP update SL 10 x 5\" HEP update        Toe yoga 10 x 5\" 10 x 5\" 10 x 5\"          Toe splay 10 x 5\" 10 x 5\" 10 x 5\"          GT abd w/ ball 10 x 5\" 10 x 5\"           Seated Post tib calf raise 3 x 10  3 x 10                                     Ther Ex             Plantar fascia towel stretch 4 x 30\" 2 x 30\"           TB 4 way    10 ea OTB reviewed  Long sitting on table and in chair GTB                                                                                      Ther Activity                                       Gait Training                                       Modalities                                            "

## 2023-05-25 ENCOUNTER — APPOINTMENT (OUTPATIENT)
Dept: LAB | Facility: CLINIC | Age: 63
End: 2023-05-25

## 2023-05-25 DIAGNOSIS — R73.09 ABNORMAL GLUCOSE: ICD-10-CM

## 2023-05-25 LAB
EST. AVERAGE GLUCOSE BLD GHB EST-MCNC: 100 MG/DL
HBA1C MFR BLD: 5.1 %

## 2023-06-06 ENCOUNTER — OFFICE VISIT (OUTPATIENT)
Dept: PHYSICAL THERAPY | Facility: OTHER | Age: 63
End: 2023-06-06
Payer: COMMERCIAL

## 2023-06-06 DIAGNOSIS — M20.11 HALLUX VALGUS OF RIGHT FOOT: Primary | ICD-10-CM

## 2023-06-06 DIAGNOSIS — M77.8 FLEXOR HALLUCIS LONGUS TENDINITIS: ICD-10-CM

## 2023-06-06 DIAGNOSIS — M20.12 HALLUX VALGUS OF LEFT FOOT: ICD-10-CM

## 2023-06-06 PROCEDURE — 97140 MANUAL THERAPY 1/> REGIONS: CPT | Performed by: PHYSICAL THERAPIST

## 2023-06-08 NOTE — PROGRESS NOTES
Daily Note     Today's date: 2023  Patient name: Aureliano Franco  : 1960  MRN: [id number]  Referring provider: Finesse Ramirez, MERRY  Dx:   Encounter Diagnosis     ICD-10-CM    1  Hallux valgus of right foot  M20 11       2  Hallux valgus of left foot  M20 12       3  Flexor hallucis longus tendinitis  M77 8           Start Time: 1244  Stop Time: 4130  Total time in clinic (min): 60 minutes  1 on 1 for entirety    Subjective: Patient reports R ankle pain as primary complaint  She reports good tolerance to her orthotics  Objective: See treatment diary below      Assessment: Radha Parry continues to make notable improvement in foot and ankle mobility and toe position  Her 2nd toe of her L foot was able to sit flush with the 2nd MTP after removing her shoe and actively splaying her toes  She no longer requires passive movement to place her 1st met in a neutral position Added LASER this visit to address ankle inflammation  Today she tolerated treatment well  Will assess response to LASER next visit  Patient will continue to schedule PT through the end of the month as gradual improvement in mobility and ROM is noted each visit  Plan: Continue per plan of care        Precautions: HV, 2nd MT dislocation, tendinitis      Manuals  5/ 6      EPAT, FHL, ant tib, post tib 2000p 1 8 21Hz, ceramx 2000p 1 8 21Hz, ceramx 2000p 1 8 21Hz, ceramx 2000p 1 8 21Hz, ceramx 2000p 1 8 21Hz, ceramx 2000p 1 8 21Hz, ceramx   EB          ISTM    Ant tib EB EB        LASER       Sprain/strain EB      Forefoot mobs  EB gr 4 EB gr 4 EB gr 4  EB gr 4 EB gr 4 EB gr 4      TCJ mob post  EB gr 4 EB gr 4 EB gr 4 EB gr 4 EB gr 4 EB gr 4      Subtalar med/lat mob  EB gr 3/4 med/lat EB gr 3/4 med/lat EB gr 3/4 med/lat EB gr 3/4 med/lat EB gr 3/4 med/lat EB gr 3/4 med/lat      Midfoot mobs    EB gr 4 EB gr 4 EB gr 4 EB gr 4      HAV correction tape  EB EB hold resumed EB EB EB      Navicular sling tape  EB EB hold "D/c        Neuro Re-Ed             Short foot 10 x 5\" 10 x 5\" 10 x 5\" SL 10 x 5\" HEP update SL 10 x 5\" HEP update        Toe yoga 10 x 5\" 10 x 5\" 10 x 5\"          Toe splay 10 x 5\" 10 x 5\" 10 x 5\"          GT abd w/ ball 10 x 5\" 10 x 5\"           Seated Post tib calf raise 3 x 10  3 x 10                                     Ther Ex             Plantar fascia towel stretch 4 x 30\" 2 x 30\"           TB 4 way    10 ea OTB reviewed  Long sitting on table and in chair GTB                                                                                      Ther Activity                                       Gait Training                                       Modalities                                            "

## 2023-06-13 ENCOUNTER — OFFICE VISIT (OUTPATIENT)
Dept: PHYSICAL THERAPY | Facility: OTHER | Age: 63
End: 2023-06-13
Payer: COMMERCIAL

## 2023-06-13 DIAGNOSIS — M20.12 HALLUX VALGUS OF LEFT FOOT: ICD-10-CM

## 2023-06-13 DIAGNOSIS — M77.8 FLEXOR HALLUCIS LONGUS TENDINITIS: ICD-10-CM

## 2023-06-13 DIAGNOSIS — M20.11 HALLUX VALGUS OF RIGHT FOOT: Primary | ICD-10-CM

## 2023-06-13 PROCEDURE — 97140 MANUAL THERAPY 1/> REGIONS: CPT | Performed by: PHYSICAL THERAPIST

## 2023-06-13 NOTE — PROGRESS NOTES
"Daily Note     Today's date: 2023  Patient name: Laureen Hartmann  : 1960  MRN: [id number]  Referring provider: Javier Diaz, PT  Dx:   Encounter Diagnosis     ICD-10-CM    1  Hallux valgus of right foot  M20 11                    1 on 1 for entirety    Subjective: Patient reports good tolerance to her new orthotics for her flats  She also continues to note improvements in toe mobility  Objective: See treatment diary below      Assessment: Tiny Sosa continues to make notable improvement in foot and ankle mobility and toe position  Continued with LASER to address tendon inflammation  Added 2nd toe taping to help with relocation  2nd and 1st ray with notable improvements in alignment with taping  Today she tolerated treatment well  Will assess response to LASER next visit  Patient will continue to schedule PT through the end of the month as gradual improvement in mobility and ROM is noted each visit  Plan: Continue per plan of care        Precautions: HV, 2nd MT dislocation, tendinitis      Manuals  5/     EPAT, FHL, ant tib, post tib 2000p 1 8 21Hz, ceramx 2000p 1 8 21Hz, ceramx 2000p 1 8 21Hz, ceramx 2000p 1 8 21Hz, ceramx 2000p 1 8 21Hz, ceramx 2000p 1 8 21Hz, ceramx   EB          ISTM    Ant tib EB EB        LASER       Sprain/strain EB Sprain/strain     Forefoot mobs  EB gr 4 EB gr 4 EB gr 4  EB gr 4 EB gr 4 EB gr 4 EB gr 4     TCJ mob post  EB gr 4 EB gr 4 EB gr 4 EB gr 4 EB gr 4 EB gr 4 EB gr 4     Subtalar med/lat mob  EB gr 3/4 med/lat EB gr 3/4 med/lat EB gr 3/4 med/lat EB gr 3/4 med/lat EB gr 3/4 med/lat EB gr 3/4 med/lat EB gr 3/4 med/at     Midfoot mobs    EB gr 4 EB gr 4 EB gr 4 EB gr 4 EB gr 4     HAV correction tape  EB EB hold resumed EB EB EB EB     2nd toe figure 8 tape        EB     Navicular sling tape  EB EB hold D/c        Neuro Re-Ed             Short foot 10 x 5\" 10 x 5\" 10 x 5\" SL 10 x 5\" HEP update SL 10 x 5\" HEP update        Toe yoga " "10 x 5\" 10 x 5\" 10 x 5\"          Toe splay 10 x 5\" 10 x 5\" 10 x 5\"     10 ea     GT abd w/ ball 10 x 5\" 10 x 5\"           Seated Post tib calf raise 3 x 10  3 x 10                                     Ther Ex             Plantar fascia towel stretch 4 x 30\" 2 x 30\"           TB 4 way    10 ea OTB reviewed  Long sitting on table and in chair GTB                                                                                      Ther Activity                                       Gait Training                                       Modalities                                            "

## 2023-06-20 ENCOUNTER — OFFICE VISIT (OUTPATIENT)
Dept: PHYSICAL THERAPY | Facility: OTHER | Age: 63
End: 2023-06-20
Payer: COMMERCIAL

## 2023-06-20 DIAGNOSIS — M77.8 FLEXOR HALLUCIS LONGUS TENDINITIS: ICD-10-CM

## 2023-06-20 DIAGNOSIS — M20.12 HALLUX VALGUS OF LEFT FOOT: ICD-10-CM

## 2023-06-20 DIAGNOSIS — M20.11 HALLUX VALGUS OF RIGHT FOOT: Primary | ICD-10-CM

## 2023-06-20 PROCEDURE — 97140 MANUAL THERAPY 1/> REGIONS: CPT | Performed by: PHYSICAL THERAPIST

## 2023-06-20 PROCEDURE — 97110 THERAPEUTIC EXERCISES: CPT | Performed by: PHYSICAL THERAPIST

## 2023-06-20 NOTE — PROGRESS NOTES
Daily Note     Today's date: 2023  Patient name: Ondina Daniel  : 1960  MRN: [id number]  Referring provider: Byron Mahajan PT  Dx:   Encounter Diagnosis     ICD-10-CM    1  Hallux valgus of right foot  M20 11       2  Hallux valgus of left foot  M20 12       3  Flexor hallucis longus tendinitis  M77 8                    1 on 1 for entirety    Subjective: Patient reports L ankle pain remains about the same  Reports compliance with added extensor stretch last visit  Objective: See treatment diary below      Assessment: Braden Durán continues to make notable improvement in foot and ankle mobility and toe position  Continued with taping as position continues to improve with taping  Discussed alternative braces found on [employer]  Today she tolerated treatment well  D/c Laser as patient has not noticed any improvement with prior inflammation with LASER treatment  Patient will continue to schedule PT through the end of the month as gradual improvement in mobility and ROM is noted each visit  Plan: Continue per plan of care        Precautions: HV, 2nd MT dislocation, tendinitis      Manuals /    EPAT, FHL, ant tib, post tib 2000p 1 8 21Hz, ceramx 2000p 1 8 21Hz, ceramx 2000p 1 8 21Hz, ceramx 2000p 1 8 21Hz, ceramx 2000p 1 8 21Hz, ceramx 2000p 1 8 21Hz, ceramx   EB          ISTM    Ant tib EB EB        LASER       Sprain/strain EB Sprain/strain     Forefoot mobs  EB gr 4 EB gr 4 EB gr 4  EB gr 4 EB gr 4 EB gr 4 EB gr 4 EB gr 4    TCJ mob post  EB gr 4 EB gr 4 EB gr 4 EB gr 4 EB gr 4 EB gr 4 EB gr 4 EB gr 4    Subtalar med/lat mob  EB gr 3/4 med/lat EB gr 3/4 med/lat EB gr 3/4 med/lat EB gr 3/4 med/lat EB gr 3/4 med/lat EB gr 3/4 med/lat EB gr 3/4 med/at EB gr 3/4    Midfoot mobs    EB gr 4 EB gr 4 EB gr 4 EB gr 4 EB gr 4 EB gr 4    HAV correction tape  EB EB hold resumed EB EB EB EB EB    2nd toe figure 8 tape        EB EB    Navicular sling tape  EB EB hold D/c "     Neuro Re-Ed             Short foot 10 x 5\" 10 x 5\" 10 x 5\" SL 10 x 5\" HEP update SL 10 x 5\" HEP update        Toe yoga 10 x 5\" 10 x 5\" 10 x 5\"          Toe splay 10 x 5\" 10 x 5\" 10 x 5\"     10 ea     GT abd w/ ball 10 x 5\" 10 x 5\"           Seated Post tib calf raise 3 x 10  3 x 10           GT flex TB         [place] 2 x 10                 Ther Ex             Plantar fascia towel stretch 4 x 30\" 2 x 30\"           TB 4 way    10 ea OTB reviewed  Long sitting on table and in chair GTB                                                                                      Ther Activity                                       Gait Training                                       Modalities                                            "

## 2023-06-26 ENCOUNTER — OFFICE VISIT (OUTPATIENT)
Dept: DIABETES SERVICES | Facility: CLINIC | Age: 63
End: 2023-06-26
Payer: COMMERCIAL

## 2023-06-26 VITALS — WEIGHT: 203.2 LBS | BODY MASS INDEX: 36 KG/M2

## 2023-06-26 DIAGNOSIS — E74.39 GLUCOSE INTOLERANCE: Primary | ICD-10-CM

## 2023-06-26 PROCEDURE — 97803 MED NUTRITION INDIV SUBSEQ: CPT | Performed by: DIETITIAN, REGISTERED

## 2023-06-26 NOTE — PROGRESS NOTES
"Medical Nutrition Therapy        Assessment    Visit Type: Follow-up visit  Chief complaint/Medical Diagnosis/reason for visit Glucose intolerance    HPI Sandor Cabrera was seen today for a follow-up MNT visit  Weight is down ~5 pounds since her initial visit  Sandor Cabrera reports using My Fitness Pal to track her intake  She states that she is keeping to 1400 calories a day except on days that she does not exercise which she will limit calories to 1200  Sandor Cabrera expressed that she understands herself well enough to know that she must adhere to a schedule, track intake daily and avoid HS snacking to stay on track with dietary modification  Food records reveal inconsistent carbohydrate intake at meals, sub-optimal carbohydrate intake at some meals, and possible excess calories coming from high fat food choices and added fat  Encouraged the consumption of regular meals at regular times with meals being 4-5 hours apart  Advised patient to keep carbohydrate intake to 30-45 grams per meal and 15 grams per snack to assist with glycemic control  Suggested keeping protein intake to 6 ounces a day and fat to 4 servings daily to assist with lipid management and calorie control  Sandor Cabrera would benefit from making low fat food choices most often  Sandor Cabrera will continue regular aerobic exercise  She will consider including some strength training as well  Follow-up TBD  RD will remain available for further dietary questions/concerns  Ht Readings from Last 1 Encounters:   04/17/23 5' 3\" (1 6 m)     Wt Readings from Last 3 Encounters:   06/26/23 92 2 kg (203 lb 3 2 oz)   05/15/23 94 3 kg (208 lb)   03/28/23 92 1 kg (203 lb)     Weight Change: Yes 5 pound weight loss in the past 6 weeks  Food Log: See scanned food logs    Intervention: Food record review       Treatment Goals: Patient will monitor fat intake, Patient will consume 3 meals a day, Patient will count carbohydrates and Patient will exercise    Monitoring and evaluation:    Term code " indicator  FH 1 3 2 Food Intake Criteria: 1 3 meals a day 4-5 hours apart  Term code indicator  FH 1 6 3 Carbohydrate Intake Criteria: 2 30-45 grams of carbohydrate per meal and no more than 15 grams per snack  Term code indicator  FH 1 6 1 Fat and Cholesterol Intake Criteria: 3  Make low fat food choices  Term code indicator  CH 2 2 Treatments/Therapy/Alternative Medicine Criteria: 4  Continue regular aerobic exercise  Patient’s Response to Instruction:  Nohemy Irby  Expected Compliancegood    Start- Stop: 8:0-8:20  Total Minutes: 20 Minutes  Group or Individual Instruction: MNT-I  Other: Dr Homer Gee    Thank you for coming to the [address] [place] [address] for education today  Please feel free to call with any questions or concerns      Dina Schulz  [address] [place] [address] Drive [phone]

## 2023-06-26 NOTE — PATIENT INSTRUCTIONS
3 meals a day 4-5 hours apart  30-45 grams of carbohydrate per meal and no more than 15 grams per snack  Make low fat food choices  Continue regular aerobic exercise

## 2023-06-27 ENCOUNTER — OFFICE VISIT (OUTPATIENT)
Dept: PHYSICAL THERAPY | Facility: OTHER | Age: 63
End: 2023-06-27
Payer: COMMERCIAL

## 2023-06-27 DIAGNOSIS — M77.8 FLEXOR HALLUCIS LONGUS TENDINITIS: ICD-10-CM

## 2023-06-27 DIAGNOSIS — M20.11 HALLUX VALGUS OF RIGHT FOOT: Primary | ICD-10-CM

## 2023-06-27 DIAGNOSIS — M20.12 HALLUX VALGUS OF LEFT FOOT: ICD-10-CM

## 2023-06-27 PROCEDURE — 97140 MANUAL THERAPY 1/> REGIONS: CPT | Performed by: PHYSICAL THERAPIST

## 2023-06-27 NOTE — PROGRESS NOTES
"Daily Note     Today's date: 2023  Patient name: Rishabh Jaime  : 1960  MRN: [id number]  Referring provider: Roseann Lemus, PT  Dx:   Encounter Diagnosis     ICD-10-CM    1  Hallux valgus of right foot  M20 11       2  Hallux valgus of left foot  M20 12       3  Flexor hallucis longus tendinitis  M77 8                    1 on 1 for entirety    Subjective: Patient reports that she continues to note improvement in toe alignment  Since last visit she invested in the hammertoe correction device which is similar to KT taping being applied in PT  States this has helped further maintain position of her toes  Objective: See treatment diary below      Assessment: Ivon Nash continues to make notable improvement in foot and ankle mobility and toe position  Today's session focus on mobility and EPAT to addres joint restrictions of 2nd MTP  Today she tolerated treatment well  Plan: Continue per plan of care        Precautions: HV, 2nd MT dislocation, tendinitis      Manuals    EPAT, FHL, ant tib, post tib 2000p 1 8 21Hz, ceramx 2000p 1 8 21Hz, ceramx 2000p 1 8 21Hz, ceramx 2000p 1 8 21Hz, ceramx   EB       2000 p 1 0-1 4 ceramx 21 Hz 2nd MTP, extensor tendon EB   ISTM  Ant tib EB EB        LASER     Sprain/strain EB Sprain/strain     Forefoot mobs EB gr 4 EB gr 4  EB gr 4 EB gr 4 EB gr 4 EB gr 4 EB gr 4 EB gr 4   TCJ mob post EB gr 4 EB gr 4 EB gr 4 EB gr 4 EB gr 4 EB gr 4 EB gr 4 EB gr 4   Subtalar med/lat mob EB gr 3/4 med/lat EB gr 3/4 med/lat EB gr 3/4 med/lat EB gr 3/4 med/lat EB gr 3/4 med/lat EB gr 3/4 med/at EB gr 3/4 EB gr 4   Midfoot mobs  EB gr 4 EB gr 4 EB gr 4 EB gr 4 EB gr 4 EB gr 4 EB gr 4   HAV correction tape EB hold resumed EB EB EB EB EB    2nd toe figure 8 tape      EB EB    Navicular sling tape EB hold D/c        Neuro Re-Ed           Short foot 10 x 5\" SL 10 x 5\" HEP update SL 10 x 5\" HEP update        Toe yoga 10 x 5\"          Toe splay 10 x 5\"    " 10 ea     GT abd w/ ball           Seated Post tib calf raise           GT flex TB       Peach 2 x 10               Ther Ex           Plantar fascia towel stretch           TB 4 way  10 ea OTB reviewed  Long sitting on table and in chair GTB                                                                          Ther Activity                                 Gait Training                                 Modalities

## 2023-07-21 ENCOUNTER — OFFICE VISIT (OUTPATIENT)
Dept: PHYSICAL THERAPY | Facility: OTHER | Age: 63
End: 2023-07-21
Payer: COMMERCIAL

## 2023-07-21 DIAGNOSIS — M20.11 HALLUX VALGUS OF RIGHT FOOT: Primary | ICD-10-CM

## 2023-07-21 DIAGNOSIS — M20.12 HALLUX VALGUS OF LEFT FOOT: ICD-10-CM

## 2023-07-21 PROCEDURE — 97140 MANUAL THERAPY 1/> REGIONS: CPT | Performed by: PHYSICAL THERAPIST

## 2023-07-21 NOTE — PROGRESS NOTES
Daily Note     Today's date: 2023  Patient name: Mariama Barney  : 1960  MRN: [id number]  Referring provider: Adeola Corrales PT  Dx:   Encounter Diagnosis     ICD-10-CM    1. Hallux valgus of right foot  M20.11       2. Hallux valgus of left foot  M20.12           Start Time: 0745  Stop Time: 0825  Total time in clinic (min): 40 minutes  1 on 1 for entirety    Subjective: Patient reports significant improvement in mobility of her toes. States she has been utilizing a hammertoe correction brace which has significantly improved the position of her 2nd toe. She has also been using a bunion corrector which is providing relief along her first MTP joint. She reports compliance with home stretching program of her extensors and intrinsics. She also reports compliance with intrinsic activation and is able to actively complete toe yoga and toe spreading. She reports confidence and independence in completing her home program. She feels ready for d/c at this time. Objective: See treatment diary below      Assessment: Rigoberto Byrd continues to make notable improvement in foot and ankle mobility and toe position. Upon removal of her shoe her 2nd MTP is slightly elevated but, less in comparison to previous visits. Her 1st toe is no longer crossing underneath her 2nd. Notable improvement in capsular restriction and extensor tightness. Today's session focus on mobility and EPAT to addres joint restrictions of 2nd MTP. She continues to have mild discomfort along her R anterior tib. There is a notable thickness on her R ant tib as it crosses TCJ in comparison to her L. This continues to be easily inflamed with stretching and strengthening of anterior tib. Plan: Continue per plan of care.       Precautions: HV, 2nd MT dislocation, tendinitis      Manuals    EPAT, FHL, ant tib, post tib 2000p 1.8 21Hz, ceramx 2000p 1.8 21Hz, ceramx 2000p 1.8 21Hz, ceramx 2000p 1.8 21Hz, ceramx   EB       2000 p 1.0-1.4 ceramx 21 Hz 2nd MTP, extensor tendon EB 2000 p 1.0-1.4 ceramx 21 Hz 2nd MTP, extensor tendon EB   ISTM  Ant tib EB EB         LASER     Sprain/strain EB Sprain/strain      Forefoot mobs EB gr 4 EB gr 4  EB gr 4 EB gr 4 EB gr 4 EB gr 4 EB gr 4 EB gr 4 EB   TCJ mob post EB gr 4 EB gr 4 EB gr 4 EB gr 4 EB gr 4 EB gr 4 EB gr 4 EB gr 4 Gr 4   Subtalar med/lat mob EB gr 3/4 med/lat EB gr 3/4 med/lat EB gr 3/4 med/lat EB gr 3/4 med/lat EB gr 3/4 med/lat EB gr 3/4 med/at EB gr 3/4 EB gr 4 EB gr 4   Midfoot mobs  EB gr 4 EB gr 4 EB gr 4 EB gr 4 EB gr 4 EB gr 4 EB gr 4 EB gr 4   HAV correction tape EB hold resumed EB EB EB EB EB     2nd toe figure 8 tape      EB EB     Navicular sling tape EB hold D/c         Neuro Re-Ed            Short foot 10 x 5" SL 10 x 5" HEP update SL 10 x 5" HEP update         Toe yoga 10 x 5"           Toe splay 10 x 5"     10 ea      GT abd w/ ball            Seated Post tib calf raise            GT flex TB       Peach 2 x 10                 Ther Ex            Plantar fascia towel stretch            TB 4 way  10 ea OTB reviewed  Long sitting on table and in chair GTB                                                                                 Ther Activity                                    Gait Training                                    Modalities

## 2023-07-24 ENCOUNTER — TELEPHONE (OUTPATIENT)
Dept: INTERNAL MEDICINE CLINIC | Facility: CLINIC | Age: 63
End: 2023-07-24

## 2023-07-24 DIAGNOSIS — Z13.29 SCREENING FOR HYPOTHYROIDISM: ICD-10-CM

## 2023-07-24 DIAGNOSIS — R00.2 PALPITATIONS: Primary | ICD-10-CM

## 2023-07-24 NOTE — TELEPHONE ENCOUNTER
Please let the patient know that orders have been placed today at the [facility] laboratory for her to do prior to her visit with us tomorrow

## 2023-07-24 NOTE — TELEPHONE ENCOUNTER
García Burgess made an appt for tomorrow due to having an abnormal heart rate for the past 2 weeks . She is asking if you would want any testing that she can do today?

## 2023-07-25 ENCOUNTER — APPOINTMENT (OUTPATIENT)
Dept: LAB | Facility: CLINIC | Age: 63
End: 2023-07-25
Payer: COMMERCIAL

## 2023-07-25 ENCOUNTER — OFFICE VISIT (OUTPATIENT)
Dept: INTERNAL MEDICINE CLINIC | Facility: CLINIC | Age: 63
End: 2023-07-25
Payer: COMMERCIAL

## 2023-07-25 VITALS
OXYGEN SATURATION: 99 % | WEIGHT: 204 LBS | SYSTOLIC BLOOD PRESSURE: 124 MMHG | DIASTOLIC BLOOD PRESSURE: 76 MMHG | TEMPERATURE: 97.2 F | HEIGHT: 63 IN | BODY MASS INDEX: 36.14 KG/M2 | HEART RATE: 90 BPM

## 2023-07-25 DIAGNOSIS — R00.2 PALPITATIONS: Primary | ICD-10-CM

## 2023-07-25 DIAGNOSIS — Z13.29 SCREENING FOR HYPOTHYROIDISM: ICD-10-CM

## 2023-07-25 DIAGNOSIS — R00.2 PALPITATIONS: ICD-10-CM

## 2023-07-25 LAB
ALBUMIN SERPL BCP-MCNC: 3.7 G/DL (ref 3.5–5)
ALP SERPL-CCNC: 68 U/L (ref 46–116)
ALT SERPL W P-5'-P-CCNC: 21 U/L (ref 12–78)
ANION GAP SERPL CALCULATED.3IONS-SCNC: 4 MMOL/L
AST SERPL W P-5'-P-CCNC: 13 U/L (ref 5–45)
BILIRUB SERPL-MCNC: 0.4 MG/DL (ref 0.2–1)
BUN SERPL-MCNC: 16 MG/DL (ref 5–25)
CALCIUM SERPL-MCNC: 9.2 MG/DL (ref 8.3–10.1)
CHLORIDE SERPL-SCNC: 109 MMOL/L (ref 96–108)
CO2 SERPL-SCNC: 29 MMOL/L (ref 21–32)
CREAT SERPL-MCNC: 0.85 MG/DL (ref 0.6–1.3)
GFR SERPL CREATININE-BSD FRML MDRD: 72 ML/MIN/1.73SQ M
GLUCOSE P FAST SERPL-MCNC: 98 MG/DL (ref 65–99)
MAGNESIUM SERPL-MCNC: 2.2 MG/DL (ref 1.6–2.6)
POTASSIUM SERPL-SCNC: 4.2 MMOL/L (ref 3.5–5.3)
PROT SERPL-MCNC: 6.9 G/DL (ref 6.4–8.4)
SODIUM SERPL-SCNC: 142 MMOL/L (ref 135–147)
TSH SERPL DL<=0.05 MIU/L-ACNC: 1.88 UIU/ML (ref 0.45–4.5)

## 2023-07-25 PROCEDURE — 83735 ASSAY OF MAGNESIUM: CPT

## 2023-07-25 PROCEDURE — 99214 OFFICE O/P EST MOD 30 MIN: CPT | Performed by: INTERNAL MEDICINE

## 2023-07-25 PROCEDURE — 80053 COMPREHEN METABOLIC PANEL: CPT

## 2023-07-25 PROCEDURE — 84443 ASSAY THYROID STIM HORMONE: CPT

## 2023-07-25 PROCEDURE — 36415 COLL VENOUS BLD VENIPUNCTURE: CPT

## 2023-07-25 NOTE — ASSESSMENT & PLAN NOTE
Palpitation episodes associated with lightheadedness but no chest pain or shortness of breath. Both self-limiting when occurring after exercise the other 1 after consumption of caffeine. Have recommended that the patient undergo further testing including an echocardiogram of the heart as well as stress test of the heart and ZIO heart monitor. Referral to cardiology services at [facility] was also placed today. Blood work reviewed showing no metabolic causes for the symptoms. Follow-up upon completion of studies.

## 2023-07-25 NOTE — PROGRESS NOTES
Name: Sandra Epps      : 1960      MRN: [id number]  Encounter Provider: Kory Salvador MD  Encounter Date: 2023   Encounter department: [address] [place] [address] 190     1. Palpitations  Assessment & Plan:  Palpitation episodes associated with lightheadedness but no chest pain or shortness of breath. Both self-limiting when occurring after exercise the other 1 after consumption of caffeine. Have recommended that the patient undergo further testing including an echocardiogram of the heart as well as stress test of the heart and ZIO heart monitor. Referral to cardiology services at [facility] was also placed today. Blood work reviewed showing no metabolic causes for the symptoms. Follow-up upon completion of studies. Orders:  -     POCT ECG  -     Ambulatory Referral to Cardiology; Future  -     AMB extended holter monitor; Future; Expected date: 2023  -     Echo complete w/ contrast if indicated; Future; Expected date: 2023  -     Echo stress test, exercise; Future; Expected date: 2023         Subjective     This 38-year-old female patient presents to our office today for an acute visit. She is here today because she is experienced 2 recent brief episodes of palpitations. Each 1 lasted approximately 10 seconds. They were associated with episodes of lightheadedness. Does not recall any chest pain or shortness of breath associated with the events. 1 event was associated with exercise on a treadmill. The second event occurred after consumption of caffeine. Comprehensive metabolic profile magnesium and thyroid testing were all reviewed today and show no evidence of any imbalance. Electrocardiogram today is normal and has not changed from previous examination 1 year ago. Review of Systems   Cardiovascular: Positive for palpitations.        Past Medical History:   Diagnosis Date   • Acne 1970   • Allergic Per chart   • Arthritis • Bunion    • Callus    • Disease of thyroid gland    • Gout    • Hammer toe    • High arches    • Ingrown toenail    • Left knee injury     grade 1 injury of medial collateral ligament of left knee, sprain  of MCL joint   • Lichen sclerosus    • Menometrorrhagia    • Metrorrhagia    • Mitral valve prolapse syndrome     leaflet syndrome   • Osteopenia    • Plantar fasciitis    • Rosacea    • Rosacea    • Thyroid nodule     Nontoxic single   • Tinea pedis      Past Surgical History:   Procedure Laterality Date   •  SECTION, LOW TRANSVERSE     • COLONOSCOPY     • CYSTOSCOPY N/A 2018    Procedure: CYSTOSCOPY;  Surgeon: Arelis Chowdhury MD;  Location: [facility];  Service: Gynecology Oncology   • DILATION AND CURETTAGE OF UTERUS     • FRACTURE SURGERY Right     right wrist.   • HYSTERECTOMY  2018    @ age 59   • OOPHORECTOMY Bilateral 2018   • POLYPECTOMY N/A 10/18/2016    Procedure: POLYPECTOMY;  Surgeon: Sydelle Gottron, DO;  Location: [facility];  Service:    • NC HYSTEROSCOPY BX ENDOMETRIUM&/POLYPC W/WO D&C N/A 10/18/2016    Procedure: DILATATION AND CURETTAGE (D&C) WITH HYSTEROSCOPY;  Surgeon: Sydelle Gottron, DO;  Location: [facility];  Service: Gynecology   • NC LAPS TOTAL HYSTERECT 250 GM/< W/RMVL TUBE/OVARY N/A 2018    Procedure: ROBOTIC HYSTERECTOMY, BSO.;  Surgeon: Arelis Chowdhury MD;  Location: [facility];  Service: Gynecology Oncology     Family History   Problem Relation Age of Onset   • Diabetes Mother    • Heart disease Mother    • Stroke Mother    • Thyroid disease Mother    • Diabetes Father    • Heart disease Father    • Hypertension Family    • Uterine cancer Sister    • Thyroid cancer Sister    • Endometrial cancer Sister 36   • Prostate cancer Brother 58   • No Known Problems Brother    • Allergies Sister    • No Known Problems Brother    • Lionel syndrome Sister    • Neurodegenerative disease Sister    • Ovarian cancer Paternal Aunt 72   • No Known Problems Daughter • No Known Problems Maternal Grandmother    • No Known Problems Maternal Grandfather    • No Known Problems Paternal Grandmother    • No Known Problems Paternal Grandfather    • No Known Problems Maternal Aunt    • No Known Problems Maternal Aunt    • No Known Problems Maternal Aunt    • No Known Problems Maternal Aunt    • No Known Problems Maternal Aunt    • No Known Problems Paternal Aunt    • No Known Problems Paternal Aunt      Social History     Socioeconomic History   • Marital status: /Civil Union     Spouse name: Marybeth Morris   • Number of children: 1   • Years of education: None   • Highest education level: None   Occupational History   • Occupation: [employer]   Tobacco Use   • Smoking status: Former     Packs/day: 0.50     Years: 20.00     Total pack years: 10.00     Types: Cigarettes     Quit date: 2000     Years since quittin.5   • Smokeless tobacco: Never   • Tobacco comments:     Quit   Vaping Use   • Vaping Use: Never used   Substance and Sexual Activity   • Alcohol use: Yes     Alcohol/week: 2.0 standard drinks of alcohol     Types: 1 Glasses of wine, 1 Standard drinks or equivalent per week   • Drug use: No     Comment: socially   • Sexual activity: Yes     Partners: Male     Birth control/protection: Post-menopausal, Surgical     Comment:    Other Topics Concern   • None   Social History Narrative    Who lives in your home:  and self    What type of home do you live in: Single    Age of your home: 38 yrs    How long have you been living there: 15 yrs    Type of heat: Forced hot air    Central AC    Type of fuel: oil    What type of radha is in your bedroom:Carpeting    Do you have the following in or near your home:    Air products: No purifiers or humdifiers    Pests: No    Pets: Dog (sweata)    Are pets allowed in bedroom: Yes    Open fields, wooded areas nearby:  Wooded area    Basement: Partial finished, dry, dehumidifier, crawl space Exposure to second hand smoke: No        Habits:    Caffeine: Coffee; Amount: 2/day, 40 years; Soda occasionally; hot tea/iced tea occasionally    Chocolate: occasionally            Works full time     Social Determinants of Health     Financial Resource Strain: Not on file   Food Insecurity: Not on file   Transportation Needs: Not on file   Physical Activity: Insufficiently Active (11/2/2021)    Exercise Vital Sign    • Days of Exercise per Week: 4 days    • Minutes of Exercise per Session: 30 min   Stress: Not on file   Social Connections: Not on file   Intimate Partner Violence: Not on file   Housing Stability: Not on file     Current Outpatient Medications on File Prior to Visit   Medication Sig   • Azelaic Acid 15 % cream Apply 1 application. topically 2 (two) times a day NOTE there is a problem on epic order set.  This is a getl formulation but somehow it reverts to cream once script is ordered on epic   • Calcium Citrate (CALCITRATE PO) Take by mouth   • Cholecalciferol (Vitamin D3) 125 MCG (5000 UT) TBDP Take by mouth daily   • clindamycin (CLEOCIN T) 1 % external solution Apply topically as needed for flare ups   • econazole nitrate 1 % cream APPLY TOPICALLY 2 TIMES A DAY AS NEEDED FOR IRRITATION   • estradiol (VIVELLE-DOT) 0.05 MG/24HR Place 1 patch on the skin 2 (two) times a week   • SF 5000 Plus 1.1 % CREA    • Sodium Fluoride 5000 Sensitive 1.1-5 % GEL    • azelaic acid (AZELEX) 20 % cream Apply topically 2 (two) times a day (Patient taking differently: Apply topically in the morning)   • fluocinolone (SYNALAR) 0.025 % ointment Apply topically 2 (two) times a day (Patient not taking: Reported on 7/25/2023)   • meloxicam (Mobic) 15 mg tablet Take 1 tablet (15 mg total) by mouth daily     Allergies   Allergen Reactions   • Ciprofloxacin Hcl Rash   • Erythromycin Rash     Powder/Suspension   • Flagyl [Metronidazole] Rash   • Bactrim [Sulfamethoxazole-Trimethoprim] Rash   • Cat Hair Extract Allergic Rhinitis   • Dust Mite Extract Allergic Rhinitis   • Wound Dressing Adhesive Rash     Immunization History   Administered Date(s) Administered   • COVID-19 PFIZER VACCINE 0.3 ML IM 12/17/2020, 01/06/2021, 09/20/2021   • COVID-19 Pfizer Vac BIVALENT August-sucrose 12 Yr+ IM (BOOSTER ONLY) 11/14/2022, 11/14/2022   • H1N1, All Formulations 11/03/2009   • INFLUENZA 09/26/2018   • Zoster Vaccine Recombinant 10/17/2018, 01/15/2019       Objective     /76   Pulse 90   Temp (!) 97.2 °F (36.2 °C)   Ht 5' 3" (1.6 m)   Wt 92.5 kg (204 lb)   LMP 10/03/2016 (Approximate)   SpO2 99%   BMI 36.14 kg/m²     Physical Exam  Vitals reviewed. Constitutional:       General: She is not in acute distress. Appearance: Normal appearance. She is well-developed. She is not ill-appearing. HENT:      Right Ear: Hearing and external ear normal.      Left Ear: Hearing and external ear normal.      Nose: Nose normal. No mucosal edema. Mouth/Throat:      Pharynx: Uvula midline. Eyes:      General: Lids are normal.      Conjunctiva/sclera: Conjunctivae normal.      Pupils: Pupils are equal, round, and reactive to light. Neck:      Thyroid: No thyromegaly. Vascular: No carotid bruit or JVD. Cardiovascular:      Rate and Rhythm: Normal rate and regular rhythm. Heart sounds: Murmur heard. Pulmonary:      Effort: Pulmonary effort is normal. No respiratory distress. Breath sounds: Normal breath sounds. No wheezing or rhonchi. Abdominal:      General: Bowel sounds are normal.      Palpations: Abdomen is soft. Musculoskeletal:         General: Normal range of motion. Right lower leg: No edema. Left lower leg: No edema. Lymphadenopathy:      Cervical: No cervical adenopathy. Skin:     General: Skin is warm and dry. Neurological:      Mental Status: She is alert and oriented to person, place, and time. Deep Tendon Reflexes: Reflexes are normal and symmetric.    Psychiatric:         Speech: Speech normal.         Behavior: Behavior normal. Behavior is cooperative. Thought Content:  Thought content normal.         Judgment: Judgment normal.       Raymundo Molina MD

## 2023-07-26 PROCEDURE — 93000 ELECTROCARDIOGRAM COMPLETE: CPT | Performed by: INTERNAL MEDICINE

## 2023-08-01 ENCOUNTER — HOSPITAL ENCOUNTER (OUTPATIENT)
Dept: NON INVASIVE DIAGNOSTICS | Facility: HOSPITAL | Age: 63
Discharge: HOME | End: 2023-08-01
Attending: INTERNAL MEDICINE
Payer: COMMERCIAL

## 2023-08-01 VITALS — HEIGHT: 63 IN | BODY MASS INDEX: 36.14 KG/M2 | WEIGHT: 204 LBS

## 2023-08-01 VITALS
HEIGHT: 63 IN | HEART RATE: 62 BPM | BODY MASS INDEX: 36.14 KG/M2 | DIASTOLIC BLOOD PRESSURE: 70 MMHG | WEIGHT: 204 LBS | SYSTOLIC BLOOD PRESSURE: 118 MMHG

## 2023-08-01 DIAGNOSIS — R00.2 PALPITATIONS: ICD-10-CM

## 2023-08-01 LAB
AORTIC ROOT: 3.3 CM
APICAL FOUR CHAMBER EJECTION FRACTION: 64 %
ASCENDING AORTA: 3.2 CM
E WAVE DECELERATION TIME: 218 MS
FRACTIONAL SHORTENING: 54 % (ref 28–44)
INTERVENTRICULAR SEPTUM IN DIASTOLE (PARASTERNAL SHORT AXIS VIEW): 1.2 CM
INTERVENTRICULAR SEPTUM: 1.2 CM (ref 0.6–1.1)
LAAS-AP2: 15.1 CM2
LAAS-AP4: 18.2 CM2
LEFT ATRIUM SIZE: 4.2 CM
LEFT ATRIUM VOLUME (MOD BIPLANE): 41 ML
LEFT INTERNAL DIMENSION IN SYSTOLE: 2.1 CM (ref 2.1–4)
LEFT VENTRICULAR INTERNAL DIMENSION IN DIASTOLE: 4.6 CM (ref 3.5–6)
LEFT VENTRICULAR POSTERIOR WALL IN END DIASTOLE: 0.9 CM
LEFT VENTRICULAR STROKE VOLUME: 81 ML
LVSV (TEICH): 81 ML
MAX HR PERCENT: 100 %
MAX HR: 157 BPM
MV E'TISSUE VEL-SEP: 10 CM/S
MV PEAK A VEL: 0.65 M/S
MV PEAK E VEL: 60 CM/S
MV STENOSIS PRESSURE HALF TIME: 63 MS
MV VALVE AREA P 1/2 METHOD: 3.49 CM2
RA PRESSURE ESTIMATED: 5 MMHG
RATE PRESSURE PRODUCT: NORMAL
RIGHT ATRIUM AREA SYSTOLE A4C: 10.8 CM2
RIGHT VENTRICLE ID DIMENSION: 3.2 CM
RV PSP: 20 MMHG
SL CV LEFT ATRIUM LENGTH A2C: 5.6 CM
SL CV LV EF: 60
SL CV LV EF: 60
SL CV PED ECHO LEFT VENTRICLE DIASTOLIC VOLUME (MOD BIPLANE) 2D: 96 ML
SL CV PED ECHO LEFT VENTRICLE SYSTOLIC VOLUME (MOD BIPLANE) 2D: 15 ML
SL CV STRESS RECOVERY BP: NORMAL MMHG
SL CV STRESS RECOVERY HR: 83 BPM
SL CV STRESS RECOVERY O2 SAT: 98 %
SL CV STRESS STAGE REACHED: 3
STRESS ANGINA INDEX: 0
STRESS BASELINE BP: NORMAL MMHG
STRESS BASELINE HR: 68 BPM
STRESS O2 SAT REST: 99 %
STRESS PEAK HR: 126 BPM
STRESS POST ESTIMATED WORKLOAD: 8.5 METS
STRESS POST EXERCISE DUR MIN: 7 MIN
STRESS POST EXERCISE DUR SEC: 0 SEC
STRESS POST O2 SAT PEAK: 95 %
STRESS POST PEAK BP: 194 MMHG
TR MAX PG: 15 MMHG
TR PEAK VELOCITY: 2 M/S
TRICUSPID ANNULAR PLANE SYSTOLIC EXCURSION: 2.4 CM
TRICUSPID VALVE PEAK REGURGITATION VELOCITY: 1.95 M/S

## 2023-08-01 PROCEDURE — 93306 TTE W/DOPPLER COMPLETE: CPT | Performed by: INTERNAL MEDICINE

## 2023-08-01 PROCEDURE — 93306 TTE W/DOPPLER COMPLETE: CPT

## 2023-08-01 PROCEDURE — 93350 STRESS TTE ONLY: CPT

## 2023-08-01 PROCEDURE — 93350 STRESS TTE ONLY: CPT | Performed by: INTERNAL MEDICINE

## 2023-08-16 DIAGNOSIS — L90.0 LICHEN SCLEROSUS: ICD-10-CM

## 2023-08-16 DIAGNOSIS — L71.9 ROSACEA: ICD-10-CM

## 2023-08-16 DIAGNOSIS — L28.0 LICHEN OF SKIN: ICD-10-CM

## 2023-08-16 DIAGNOSIS — K02.9 DENTAL CARIES: Primary | ICD-10-CM

## 2023-08-17 RX ORDER — AZELAIC ACID 0.15 G/G
1 GEL TOPICAL 2 TIMES DAILY
Qty: 50 G | Refills: 0 | Status: SHIPPED | OUTPATIENT
Start: 2023-08-17

## 2023-08-17 RX ORDER — FLUOCINOLONE ACETONIDE 0.25 MG/G
1 OINTMENT TOPICAL 2 TIMES DAILY
Qty: 60 G | Refills: 0 | Status: SHIPPED | OUTPATIENT
Start: 2023-08-17

## 2023-08-17 RX ORDER — SODIUM FLUORIDE1.1%, POTASSIUM NITRATE 5% 5.8; 57.5 MG/ML; MG/ML
GEL, DENTIFRICE DENTAL
Qty: 100 G | Refills: 5 | Status: SHIPPED | OUTPATIENT
Start: 2023-08-17

## 2023-08-18 NOTE — TELEPHONE ENCOUNTER
LVM for patient notifying her that refill for the fluocinolone was sent to [facility] pharmacy in [place].

## 2023-08-29 ENCOUNTER — TELEPHONE (OUTPATIENT)
Dept: INTERNAL MEDICINE CLINIC | Facility: CLINIC | Age: 63
End: 2023-08-29

## 2023-08-29 ENCOUNTER — TELEPHONE (OUTPATIENT)
Dept: CARDIOLOGY CLINIC | Facility: CLINIC | Age: 63
End: 2023-08-29

## 2023-08-29 NOTE — TELEPHONE ENCOUNTER
Can you please check auth for 2 week Zio XT - in office hook up.     (this patient is  at [facility])

## 2023-08-29 NOTE — TELEPHONE ENCOUNTER
Received a call from [facility] the manager at [facility] cardio dept regarding jamiaRemedios Guthrie mentioned she is waiting for a zio monitor that you ordered last month. nAnika Sloan called back after looking into this further - here is the message. Hi, this is Domenica Victor. I am the manager of cardiology over in [facility]. I called earlier spoke to Butch in reference to patient Yuridia Bowman's date of birth [id number] in reference to a Zio heart monitor. Upon looking in the chart a little bit further, it does look like Doctor Denice De La Rosa put in a request for an ambulatory referral to Cardiology. I did call over to Cardiology They have it sit and want it in one of their work queues as pending. They are going to pick it up and start running with it. So they will get a hold of Yuridia Guthrie, the patient, and we'll try to set that up. So there should be no need for your office unless the cardiology office calls you, but there should be no need for you to reach out to the patient. The cardiology office is going to be doing so. Again, this is [employer] from [facility] cardiology department. My number here is [phone] if you want to get back to me. Thank you.  Alissa.

## 2023-08-30 ENCOUNTER — CLINICAL SUPPORT (OUTPATIENT)
Dept: CARDIOLOGY CLINIC | Facility: CLINIC | Age: 63
End: 2023-08-30
Payer: COMMERCIAL

## 2023-08-30 DIAGNOSIS — R00.2 PALPITATIONS: Primary | ICD-10-CM

## 2023-08-30 PROCEDURE — 93242 EXT ECG>48HR<7D RECORDING: CPT | Performed by: INTERNAL MEDICINE

## 2023-08-30 NOTE — PROGRESS NOTES
Patient presents to the office for a 1 week Zio XT per Dr. Zari Cohn. Patch applied and all instructions given. Patient verbalized understanding.

## 2023-08-30 NOTE — TELEPHONE ENCOUNTER
[facility] [place] EE Plan does not require auth for 2W [id number] XT or 1W [id number]/[id number] XT.

## 2023-09-15 ENCOUNTER — CLINICAL SUPPORT (OUTPATIENT)
Dept: CARDIOLOGY CLINIC | Facility: CLINIC | Age: 63
End: 2023-09-15
Payer: COMMERCIAL

## 2023-09-15 DIAGNOSIS — R00.2 PALPITATIONS: ICD-10-CM

## 2023-09-15 PROCEDURE — 93244 EXT ECG>48HR<7D REV&INTERPJ: CPT | Performed by: INTERNAL MEDICINE

## 2023-09-15 NOTE — RESULT ENCOUNTER NOTE
Brief runs of paroxysmal atrial tachycardia mostly lasting 1 to 3 seconds. These are consistent with Muriel's symptoms.   Recommend repeat 2 week monitor in 2-3 years unless symptoms change

## 2023-10-17 ENCOUNTER — OFFICE VISIT (OUTPATIENT)
Dept: DERMATOLOGY | Facility: CLINIC | Age: 63
End: 2023-10-17
Payer: COMMERCIAL

## 2023-10-17 VITALS — TEMPERATURE: 96 F | BODY MASS INDEX: 35.61 KG/M2 | HEIGHT: 63 IN | WEIGHT: 201 LBS

## 2023-10-17 DIAGNOSIS — L90.0 LICHEN SCLEROSUS: ICD-10-CM

## 2023-10-17 DIAGNOSIS — L82.1 SEBORRHEIC KERATOSIS: Primary | ICD-10-CM

## 2023-10-17 DIAGNOSIS — L71.9 ROSACEA: ICD-10-CM

## 2023-10-17 PROCEDURE — 99214 OFFICE O/P EST MOD 30 MIN: CPT | Performed by: DERMATOLOGY

## 2023-10-17 RX ORDER — FLUOCINOLONE ACETONIDE 0.25 MG/G
1 OINTMENT TOPICAL 2 TIMES DAILY
Qty: 60 G | Refills: 0 | Status: SHIPPED | OUTPATIENT
Start: 2023-10-17

## 2023-10-17 RX ORDER — AZELAIC ACID 0.15 G/G
1 GEL TOPICAL 2 TIMES DAILY
Qty: 50 G | Refills: 0 | Status: SHIPPED | OUTPATIENT
Start: 2023-10-17

## 2023-10-17 RX ORDER — CLINDAMYCIN PHOSPHATE 11.9 MG/ML
SOLUTION TOPICAL
Qty: 30 ML | Refills: 0 | Status: SHIPPED | OUTPATIENT
Start: 2023-10-17

## 2023-10-17 NOTE — PROGRESS NOTES
[facility] Note     Patient Name: Basilia Duarte  Encounter Date: 10/17/2023     Have you been cared for by a [facility]ologist in the last 3 years and, if so, which description applies to you? Yes. I have been here within the last 3 years, and my medical history has NOT changed since that time. I am FEMALE/of child-bearing potential.    REVIEW OF SYSTEMS:  Have you recently had or currently have any of the following? No changes in my recent health. PAST MEDICAL HISTORY:  Have you personally ever had or currently have any of the following? If "YES," then please provide more detail. No changes in my medical history. FAMILY HISTORY:  Any "first degree relatives" (parent, brother, sister, or child) with the following? No changes in my family's known health. PATIENT EXPERIENCE:    Do you want the Dermatologist to perform a COMPLETE skin exam today including a clinical examination under the "bra and underwear" areas? NO  If necessary, do we have your permission to call and leave a detailed message on your Preferred Phone number that includes your specific medical information? Yes      Allergies   Allergen Reactions   • Ciprofloxacin Hcl Rash   • Erythromycin Rash     Powder/Suspension   • Flagyl [Metronidazole] Rash   • Bactrim [Sulfamethoxazole-Trimethoprim] Rash   • Cat Hair Extract Allergic Rhinitis   • Dust Mite Extract Allergic Rhinitis   • Wound Dressing Adhesive Rash      Current Outpatient Medications:   •  Azelaic Acid 15 % cream, Apply 1 application. topically 2 (two) times a day NOTE there is a problem on epic order set.  This is a getl formulation but somehow it reverts to cream once script is ordered on epic, Disp: 50 g, Rfl: 0  •  Calcium Citrate (CALCITRATE PO), Take by mouth, Disp: , Rfl:   •  Cholecalciferol (Vitamin D3) 125 MCG (5000 UT) TBDP, Take by mouth daily, Disp: , Rfl:   •  clindamycin (CLEOCIN T) 1 % external solution, Apply topically as needed for flare ups, Disp: 30 mL, Rfl: 0  •  econazole nitrate 1 % cream, Apply topically 2 (two) times a day, Disp: 30 g, Rfl: 2  •  estradiol (VIVELLE-DOT) 0.05 MG/24HR, Place 1 patch on the skin 2 (two) times a week, Disp: 24 patch, Rfl: 3  •  fluocinolone (SYNALAR) 0.025 % ointment, Apply 1 Application topically 2 (two) times a day, Disp: 60 g, Rfl: 0  •  SF 5000 Plus 1.1 % CREA, , Disp: , Rfl:   •  Sodium Fluoride 5000 Sensitive 1.1-5 % GEL, Apply thin ribbon to toothbrush and brush thoroughly for 2 minutes daily, Disp: 100 g, Rfl: 5  •  azelaic acid (AZELEX) 20 % cream, Apply topically 2 (two) times a day (Patient taking differently: Apply topically in the morning), Disp: 50 g, Rfl: 0  •  meloxicam (Mobic) 15 mg tablet, Take 1 tablet (15 mg total) by mouth daily, Disp: 10 tablet, Rfl: 0          Whom besides the patient is providing clinical information about today's encounter? NO ADDITIONAL HISTORIAN (patient alone provided history)    Physical Exam and Assessment/Plan by Diagnosis:    LICHEN SCLEROSUS ATROPHICUS     Physical Exam:  Anatomic Location Affected:  Perineum  Morphological Description:  Mild sclerosis, mild erythema no Stricture  Pertinent Positives:  Pertinent Negatives:No regional lymphadenopathy     Additional History of Present Condition:  Present for years. Patient is using   fluocinolone ointment     Assessment and Plan:  Based on a thorough discussion of this condition and the management approach to it (including a comprehensive discussion of the known risks, side effects and potential benefits of treatment), the patient (family) agrees to implement the following specific plan:  Continue fluocinolone ointment daily as needed. I note that clinically improved. Maintenance of use of steroid ointmnet twice a week recommended to prevent long term sequelae such as stricture. I note disease is relatively stable. Cutaneous component quiet and minimal component of scaring.   No active perineal inflammation. Lichen sclerosus is a chronic inflammatory skin disorder that most often affects the genital and perianal areas. It is more common in women before puberty or after menopause. It is rare in males, but when present is called "balanitis xerotica obliterans."  Lichen sclerosus can start at any age, although it is most often diagnosed in women over 48. Pre-pubertal children can also be affected. Lichen sclerosis is 10 times more common in women than in men. 15% of patients know of a family member with lichen sclerosis. It may follow or co-exist with another skin condition, most often lichen simplex, psoriasis, erosive lichen planus, vitiligo or morphea. People with lichen sclerosus often have a personal or family history of other autoimmune disease such as thyroid disease (about 20% of patients), pernicious anaemia, or alopecia areata. What causes lichen sclerosus? The cause of lichen sclerosus is not fully understood, and may include genetic, hormonal, irritant, traumatic and infectious components. Lichen sclerosis is often classified as an autoimmune disease. Autoimmune disorders arise when the body's natural defenses against “foreign” or invading organisms (e.g., antibodies) begin to attack healthy tissue for unknown reasons. Antibodies to other unknown proteins may account for other cases, explaining differing presentations of lichen sclerosis and response to treatment. However, these antibodies could be epigenetic, ie, the results of disease rather than the cause of disease. Some scientists believe that a genetic predisposition to lichen sclerosus exists. A genetic predisposition means that a person may carry a gene for a disease but it may not be expressed unless something in the environment triggers the disease. Other researchers believe that hormonal, irritant and/or infectious factors (or a combination of these) cause this skin condition.  Cases where lichen sclerosus appears on skin after it has been damaged (from an injury or trauma) have been reported. What are the clinical features of lichen sclerosus? Lichen sclerosus usually affects the external genitalia (vulva or penis) and/or the area around the anus (perianal region). Sometimes, it is accompanied by intense (intractable) itching, burning, and pain. If the disease is severe, even minor abrasions or chaffing can cause bleeding, tearing, and blistering. The scarring that results from untreated lichen sclerosis produces problems with urination, defecation, and intercourse. The presence of thin, easily irritated, and torn skin affects physical activity and clothing choice. For children with lichen sclerosus affecting the perianal region, constipation may be among the first signs of the presence of the disease. Lichen sclerosus is much more likely to affect males that have not been circumcised than males that have been. Rarely, lichen sclerosus can also affect other areas of the skin such as the breast, wrists, shoulder, neck, back, thigh, and the mouth. Skin tissue often becomes thin, shiny, wrinkled and parchment-like. Fissures, cracks, and purplish patches (ecchymoses) appear frequently. The earliest areas of lichen sclerosis exhibit a porcelain white appearing center surrounded by redness. This grows together to form larger areas of lichen sclerosus. The areas that are prone to rubbing and friction can develop blisters or bruising. The long term result of lichen sclerosus are areas of shiny, thin skin that has a tendency to be dry, crack, or bleed. This also produces loss of the normal parts of the external genitals, narrowing of the opening of the urethra/vagina/anus, and phimosis (inability to retract the foreskin) in men. The presence of non-healing ulcers or raised ulcerated areas in the external genitalia of women raises suspicion for the development of squamous cell carcinoma.      In males, lichen sclerosus most commonly affects the foreskin of the penis, although it may affect other areas of the body. The opening at the end of the foreskin may become narrow and scarred. Discoloration and skin changes may also occur. Symptoms also include itching, soreness, and painful erections. In men, involvement in the perineal area is rare. In some rare cases, skin lesions may also develop in the mouth. The lesions consist of bluish-white flat irregular patchy areas on the inside of the cheeks and/or palate. The tongue, lips, and gums may also be involved. How do we diagnose lichen sclerosus atrophicus? Lichen sclerosus is diagnosed by looking at the skin affected. All those affected require a thorough clinical evaluation, identification of characteristic physical features, and a detailed patient history. A biopsy may be needed to confirm diagnosis. Biopsies may also be performed if squamous cell carcinoma is suspected. How do we treat lichen sclerosis? General measures for genital lichen sclerosis    Wash gently once or twice daily. Use a non-soap cleanser, if any. Try to avoid tight clothing, rubbing and scratching. Activities such as riding a bicycle or horse may aggravate symptoms. If incontinent, seek medical advice and treatment. Apply emollients to relieve dryness and itching, and as a barrier to protect sensitive skin in genital and anal areas from contact with urine and feces. Topical steroids are the main treatment for lichen sclerosus. An ultrapotent topical steroid is often prescribed, eg clobetasol propionate 0.05%. A potent topical steroid, eg mometasone furoate 0.1% ointment, may also be used in mild disease or when symptoms are controlled. An ointment base is less likely than cream to sting or to cause contact dermatitis. A thin smear should be precisely applied to the white plaques and rubbed in gently. Most patients will be told to apply the steroid ointment once a day.  After one to three months (depending on the severity of the disease), the ointment can be used less often. Topical steroid may need to be continued once or twice a week to control symptoms or to prevent lichen sclerosis recurring. Itch often settles within a few days but it may take weeks to months for the skin to return to normal (if at all). One 30-g tube of topical steroid should last 3 to 6 months or longer. It is most important to follow instructions carefully and to attend follow-up appointments regularly. Other topical treatments used in patients with lichen sclerosis include:  Intravaginal estrogen cream or pessaries in postmenopausal women. These reduce symptoms due to atrophic vulvovaginitis (dry, thin, fissured and sensitive vulval and vaginal tissues due to hormonal deficiency). Topical calcineurin inhibitors tacrolimus ointment and pimecrolimus cream instead of or in addition to topical steroids. They tend to cause burning discomfort (at least for the first few days). Early concern that these medications may have the potential to accelerate cancer growth in the presence of oncogenic human papilloma virus (the cause of genital warts) appears unfounded. Topical retinoid (eg tretinoin cream) is not well tolerated on genital skin but may be applied to other sites affected by lichen sclerosis. It reduces scaling and dryness. Oral medications: when severe, acute, and not responding to topical therapy, systemic treatment may rarely be prescribed. Options include:    Intralesional or systemic corticosteroids   Oral retinoids: acitretin, isotretinoin  Methotrexate  Ciclosporin     Surgery: is essential for high-grade squamous intraepithelial lesions or cancer. In males, circumcision is effective in lichen sclerosus affecting prepuce and glans of the penis. It is best done early if initial topical steroids have not controlled symptoms and signs.  If the urethra is stenosed or scarred, reconstructive surgery may be necessary. Unfortunately, lichen sclerosus sometimes closes up the vaginal opening again after surgery has initially appeared successful. It can be repeated. Other reported treatments for lichen sclerosus are considered experimental at this time. CO2 laser ablation of hyperkeratotic plaques  Phototherapy  Photodynamic therapy  Fat injections  Stem cell and platelet-rich plasma injections        SEBORRHEIC KERATOSIS; NON-INFLAMED    Physical Exam:  Anatomic Location Affected:  Trunk, arms, and legs   Morphological Description:  Flat and raised, waxy, smooth to warty textured, yellow to brownish-grey to dark brown to blackish, discrete, "stuck-on" appearing papules. Pertinent Positives:  Pertinent Negatives: Additional History of Present Condition:  Patient reports new bumps on the skin. Denies itch, burn, pain, bleeding or ulceration. Present constantly; nothing seems to make it worse or better. No prior treatment. Assessment and Plan:  Based on a thorough discussion of this condition and the management approach to it (including a comprehensive discussion of the known risks, side effects and potential benefits of treatment), the patient (family) agrees to implement the following specific plan:  Monitor for changes    Seborrheic Keratosis  A seborrheic keratosis is a harmless warty spot that appears during adult life as a common sign of skin aging. Seborrheic keratoses can arise on any area of skin, covered or uncovered, with the usual exception of the palms and soles. They do not arise from mucous membranes. Seborrheic keratoses can have highly variable appearance. Seborrheic keratoses are extremely common. It has been estimated that over 90% of adults over the age of 61 years have one or more of them. They occur in males and females of all races, typically beginning to erupt in the 35s or 45s. They are uncommon under the age of 21 years.   The precise cause of seborrhoeic keratoses is not known.  Seborrhoeic keratoses are considered degenerative in nature. As time goes by, seborrheic keratoses tend to become more numerous. Some people inherit a tendency to develop a very large number of them; some people may have hundreds of them. The name "seborrheic keratosis" is misleading, because these lesions are not limited to a seborrhoeic distribution (scalp, mid-face, chest, upper back), nor are they formed from sebaceous glands, nor are they associated with sebum -- which is greasy. Seborrheic keratosis may also be called "SK," "Seb K," "basal cell papilloma," "senile wart," or "barnacle."      Researchers have noted:  Eruptive seborrhoeic keratoses can follow sunburn or dermatitis  Skin friction may be the reason they appear in body folds  Viral cause (e.g., human papillomavirus) seems unlikely  Stable and clonal mutations or activation of FRFR3, PIK3CA, SUNDEEP, AKT1 and EGFR genes are found in seborrhoeic keratoses  Seborrhoeic keratosis can arise from solar lentigo  FRFR3 mutations also arise in solar lentigines. These mutations are associated with increased age and location on the head and neck, suggesting a role of ultraviolet radiation in these lesions  Seborrheic keratoses do not harbour tumour suppressor gene mutations  Epidermal growth factor receptor inhibitors, which are used to treat some cancers, often result in an increase in verrucal (warty) keratoses. There is no easy way to remove multiple lesions on a single occasion. Unless a specific lesion is "inflamed" and is causing pain or stinging/burning or is bleeding, most insurance companies do not authorize treatment.      Scribe Attestation    I,:  Joby Kim MA am acting as a scribe while in the presence of the attending physician.:       I,:  Becca Bullard MD personally performed the services described in this documentation    as scribed in my presence.:

## 2023-10-18 ENCOUNTER — HOSPITAL ENCOUNTER (OUTPATIENT)
Dept: RADIOLOGY | Age: 63
Discharge: HOME | End: 2023-10-18
Payer: COMMERCIAL

## 2023-10-18 DIAGNOSIS — M81.6 LOCALIZED OSTEOPOROSIS WITHOUT CURRENT PATHOLOGICAL FRACTURE: ICD-10-CM

## 2023-10-18 PROCEDURE — 77080 DXA BONE DENSITY AXIAL: CPT

## 2023-10-25 DIAGNOSIS — L28.0 LICHEN OF SKIN: ICD-10-CM

## 2023-11-27 DIAGNOSIS — U07.1 COVID: Primary | ICD-10-CM

## 2023-11-27 RX ORDER — NIRMATRELVIR AND RITONAVIR 300-100 MG
3 KIT ORAL 2 TIMES DAILY
Qty: 30 TABLET | Refills: 0 | Status: SHIPPED | OUTPATIENT
Start: 2023-11-27 | End: 2023-12-02

## 2024-01-19 DIAGNOSIS — K02.9 DENTAL CARIES: ICD-10-CM

## 2024-01-19 DIAGNOSIS — E89.41 SYMPTOMATIC POSTSURGICAL MENOPAUSE: ICD-10-CM

## 2024-01-19 DIAGNOSIS — L71.9 ROSACEA: ICD-10-CM

## 2024-01-19 DIAGNOSIS — L28.0 LICHEN OF SKIN: ICD-10-CM

## 2024-01-19 RX ORDER — SODIUM FLUORIDE1.1%, POTASSIUM NITRATE 5% 5.8; 57.5 MG/ML; MG/ML
GEL, DENTIFRICE DENTAL
Qty: 100 G | Refills: 0 | Status: SHIPPED | OUTPATIENT
Start: 2024-01-19

## 2024-01-19 RX ORDER — ESTRADIOL 0.05 MG/D
1 PATCH, EXTENDED RELEASE TRANSDERMAL 2 TIMES WEEKLY
Qty: 24 PATCH | Refills: 0 | Status: SHIPPED | OUTPATIENT
Start: 2024-01-22

## 2024-01-19 RX ORDER — AZELAIC ACID 0.15 G/G
1 GEL TOPICAL 2 TIMES DAILY
Qty: 50 G | Refills: 0 | Status: SHIPPED | OUTPATIENT
Start: 2024-01-19

## 2024-01-28 ENCOUNTER — APPOINTMENT (OUTPATIENT)
Dept: LAB | Age: 64
End: 2024-01-28

## 2024-01-28 PROCEDURE — 36415 COLL VENOUS BLD VENIPUNCTURE: CPT

## 2024-02-25 LAB
APOB+LDLR+PCSK9 GENE MUT ANL BLD/T: NOT DETECTED
BRCA1+BRCA2 DEL+DUP + FULL MUT ANL BLD/T: NOT DETECTED
MLH1+MSH2+MSH6+PMS2 GN DEL+DUP+FUL M: NOT DETECTED

## 2024-05-09 ENCOUNTER — OFFICE VISIT (OUTPATIENT)
Dept: OBGYN CLINIC | Facility: CLINIC | Age: 64
End: 2024-05-09
Payer: COMMERCIAL

## 2024-05-09 VITALS
DIASTOLIC BLOOD PRESSURE: 72 MMHG | SYSTOLIC BLOOD PRESSURE: 120 MMHG | HEIGHT: 64 IN | BODY MASS INDEX: 34.31 KG/M2 | WEIGHT: 201 LBS

## 2024-05-09 DIAGNOSIS — Z01.419 ENCOUNTER FOR GYNECOLOGICAL EXAMINATION WITHOUT ABNORMAL FINDING: Primary | ICD-10-CM

## 2024-05-09 DIAGNOSIS — E89.41 SYMPTOMATIC POSTSURGICAL MENOPAUSE: ICD-10-CM

## 2024-05-09 DIAGNOSIS — L28.0 LICHEN OF SKIN: ICD-10-CM

## 2024-05-09 DIAGNOSIS — L90.0 LICHEN SCLEROSUS: ICD-10-CM

## 2024-05-09 PROCEDURE — S0612 ANNUAL GYNECOLOGICAL EXAMINA: HCPCS | Performed by: OBSTETRICS & GYNECOLOGY

## 2024-05-09 RX ORDER — ESTRADIOL 0.05 MG/D
1 PATCH, EXTENDED RELEASE TRANSDERMAL 2 TIMES WEEKLY
Qty: 24 PATCH | Refills: 3 | Status: SHIPPED | OUTPATIENT
Start: 2024-05-09

## 2024-05-09 NOTE — PROGRESS NOTES
Muriel Dinero   1960    CC:  Yearly exam    S:  63 y.o. female here for yearly exam. She is postmenopausal and has had no vaginal bleeding.  She denies vaginal discharge, itching, odor or dryness.     She had a cardiac work-up last year secondary to two episodes of feeling lightheaded.  She has a family history of stroke so she contacted Dr Worley.  Her work-up was negative.  In retrospect, she believes she had a sinus infection going on.      She continues to follow with Dr Monaco for her lichen sclerosus.      She did change her patch late by a few days this past year and had hot flashes.  Pros/cons of discontinuation discussed and reviewed paucity of data to guide us.  We agree that in the absence of known risk with her otherwise healthy status, we will continue in the coming year.       Sexual activity: She is sexually active without pain, bleeding or dryness.     Last Pap: 8/22/2017 - normal/negative HPV; s/p hysterectomy  Last Mammo: 12/27/2022 - BIRAD-1  Last Colonoscopy: 2/15/2023 - 10 years  Last DEXA: 10/18/2023 - normal    We reviewed ASCCP guidelines for Pap testing.       Current Outpatient Medications:     Azelaic Acid 15 % cream, Apply 1 application. topically 2 (two) times a day NOTE there is a problem on epic order set. This is a getl formulation but somehow it reverts to cream once script is ordered on epic, Disp: 50 g, Rfl: 0    Calcium Citrate (CALCITRATE PO), Take by mouth, Disp: , Rfl:     Cholecalciferol (Vitamin D3) 125 MCG (5000 UT) TBDP, Take by mouth daily, Disp: , Rfl:     clindamycin (CLEOCIN T) 1 % external solution, Apply topically as needed for flare ups, Disp: 30 mL, Rfl: 0    econazole nitrate 1 % cream, Apply topically 2 (two) times a day, Disp: 30 g, Rfl: 0    estradiol (VIVELLE-DOT) 0.05 MG/24HR, Place 1 patch on the skin 2 (two) times a week, Disp: 24 patch, Rfl: 0    fluocinolone (SYNALAR) 0.025 % ointment, Apply 1 Application topically 2 (two) times a day, Disp: 60 g, Rfl:  0    SF 5000 Plus 1.1 % CREA, , Disp: , Rfl:     Sodium Fluoride 5000 Sensitive 1.1-5 % GEL, Apply thin ribbon to toothbrush and brush thoroughly for 2 minutes daily, Disp: 100 g, Rfl: 0    azelaic acid (AZELEX) 20 % cream, Apply topically 2 (two) times a day (Patient taking differently: Apply topically in the morning), Disp: 50 g, Rfl: 0    meloxicam (Mobic) 15 mg tablet, Take 1 tablet (15 mg total) by mouth daily, Disp: 10 tablet, Rfl: 0  Social History     Socioeconomic History    Marital status: /Civil Union     Spouse name: Christo    Number of children: 1    Years of education: Not on file    Highest education level: Not on file   Occupational History    Occupation: [employer]   Tobacco Use    Smoking status: Former     Current packs/day: 0.00     Average packs/day: 0.5 packs/day for 20.0 years (10.0 ttl pk-yrs)     Types: Cigarettes     Start date: 1980     Quit date: 2000     Years since quittin.3    Smokeless tobacco: Never    Tobacco comments:     Quit   Vaping Use    Vaping status: Never Used   Substance and Sexual Activity    Alcohol use: Yes     Alcohol/week: 2.0 standard drinks of alcohol     Types: 1 Glasses of wine, 1 Standard drinks or equivalent per week    Drug use: No     Comment: socially    Sexual activity: Yes     Partners: Male     Birth control/protection: Post-menopausal, Surgical     Comment:    Other Topics Concern    Not on file   Social History Narrative    Who lives in your home:  and self    What type of home do you live in: Single    Age of your home: 40 yrs    How long have you been living there: 15 yrs    Type of heat: Forced hot air    Central AC    Type of fuel: oil    What type of radha is in your bedroom:Carpeting    Do you have the following in or near your home:    Air products: No purifiers or humdifiers    Pests: No    Pets: Dog (sweata)    Are pets allowed in bedroom: Yes    Open fields, wooded areas nearby: Wooded  area    Basement: Partial finished, dry, dehumidifier, crawl space    Exposure to second hand smoke: No        Habits:    Caffeine: Coffee; Amount: 2/day, 40 years; Soda occasionally; hot tea/iced tea occasionally    Chocolate: occasionally            Works full time     Social Determinants of Health     Financial Resource Strain: Not on file   Food Insecurity: Not on file   Transportation Needs: Not on file   Physical Activity: Insufficiently Active (11/2/2021)    Exercise Vital Sign     Days of Exercise per Week: 4 days     Minutes of Exercise per Session: 30 min   Stress: Not on file   Social Connections: Not on file   Intimate Partner Violence: Not on file   Housing Stability: Not on file     Family History   Problem Relation Age of Onset    Diabetes Mother     Heart disease Mother     Stroke Mother     Thyroid disease Mother     Diabetes Father     Heart disease Father     Hypertension Family     Uterine cancer Sister     Thyroid cancer Sister     Endometrial cancer Sister 40    Prostate cancer Brother 62    No Known Problems Brother     Allergies Sister     No Known Problems Brother     Lionel syndrome Sister     Neurodegenerative disease Sister     Ovarian cancer Paternal Aunt 70    No Known Problems Daughter     No Known Problems Maternal Grandmother     No Known Problems Maternal Grandfather     No Known Problems Paternal Grandmother     No Known Problems Paternal Grandfather     No Known Problems Maternal Aunt     No Known Problems Maternal Aunt     No Known Problems Maternal Aunt     No Known Problems Maternal Aunt     No Known Problems Maternal Aunt     No Known Problems Paternal Aunt     No Known Problems Paternal Aunt      Past Medical History:   Diagnosis Date    Acne 9/1970    Allergic Per chart    Arthritis     Bunion     Callus     Disease of thyroid gland     Gout     Hammer toe     High arches     Ingrown toenail     Left knee injury     grade 1 injury of medial collateral ligament of left  "knee, sprain  of MCL joint    Lichen sclerosus     Menometrorrhagia     Metrorrhagia     Mitral valve prolapse syndrome     leaflet syndrome    Osteopenia     Plantar fasciitis     Rosacea     Rosacea     Thyroid nodule     Nontoxic single    Tinea pedis         Review of Systems   Respiratory: Negative.    Cardiovascular: Negative.    Gastrointestinal: Negative for constipation and diarrhea.   Genitourinary: Negative for difficulty urinating, pelvic pain, vaginal bleeding, vaginal discharge, itching or odor.    O:  Blood pressure 120/72, height 5' 3.5\" (1.613 m), weight 91.2 kg (201 lb), last menstrual period 10/03/2016, not currently breastfeeding.    Patient appears well and is not in distress  Neck is supple without masses  Breasts are symmetrical without mass, tenderness, nipple discharge, skin changes or adenopathy.   Abdomen is soft and nontender without masses.   External genitals are normal without lesions or rashes.  Urethral meatus and urethra are normal  Bladder is normal to palpation  Vagina is normal without discharge or bleeding.   Cervix is surgically absent.   Uterus is surgically absent.  Adnexa are surgically absent; no masses.     A:   Yearly exam.     P:   Pap no longer indicated  Mammo scheduled   Colonoscopy due 2033   DEXA up to date   Refill on econozole and estradiol provided    RTO one year for yearly exam or sooner as needed.     "

## 2024-05-09 NOTE — H&P (VIEW-ONLY)
Muriel Dinero   1960    CC:  Yearly exam    S:  63 y.o. female here for yearly exam. She is postmenopausal and has had no vaginal bleeding.  She denies vaginal discharge, itching, odor or dryness.     She had a cardiac work-up last year secondary to two episodes of feeling lightheaded.  She has a family history of stroke so she contacted Dr Worley.  Her work-up was negative.  In retrospect, she believes she had a sinus infection going on.      She continues to follow with Dr Monaco for her lichen sclerosus.      She did change her patch late by a few days this past year and had hot flashes.  Pros/cons of discontinuation discussed and reviewed paucity of data to guide us.  We agree that in the absence of known risk with her otherwise healthy status, we will continue in the coming year.       Sexual activity: She is sexually active without pain, bleeding or dryness.     Last Pap: 8/22/2017 - normal/negative HPV; s/p hysterectomy  Last Mammo: 12/27/2022 - BIRAD-1  Last Colonoscopy: 2/15/2023 - 10 years  Last DEXA: 10/18/2023 - normal    We reviewed ASCCP guidelines for Pap testing.       Current Outpatient Medications:     Azelaic Acid 15 % cream, Apply 1 application. topically 2 (two) times a day NOTE there is a problem on epic order set. This is a getl formulation but somehow it reverts to cream once script is ordered on epic, Disp: 50 g, Rfl: 0    Calcium Citrate (CALCITRATE PO), Take by mouth, Disp: , Rfl:     Cholecalciferol (Vitamin D3) 125 MCG (5000 UT) TBDP, Take by mouth daily, Disp: , Rfl:     clindamycin (CLEOCIN T) 1 % external solution, Apply topically as needed for flare ups, Disp: 30 mL, Rfl: 0    econazole nitrate 1 % cream, Apply topically 2 (two) times a day, Disp: 30 g, Rfl: 0    estradiol (VIVELLE-DOT) 0.05 MG/24HR, Place 1 patch on the skin 2 (two) times a week, Disp: 24 patch, Rfl: 0    fluocinolone (SYNALAR) 0.025 % ointment, Apply 1 Application topically 2 (two) times a day, Disp: 60 g, Rfl:  0    SF 5000 Plus 1.1 % CREA, , Disp: , Rfl:     Sodium Fluoride 5000 Sensitive 1.1-5 % GEL, Apply thin ribbon to toothbrush and brush thoroughly for 2 minutes daily, Disp: 100 g, Rfl: 0    azelaic acid (AZELEX) 20 % cream, Apply topically 2 (two) times a day (Patient taking differently: Apply topically in the morning), Disp: 50 g, Rfl: 0    meloxicam (Mobic) 15 mg tablet, Take 1 tablet (15 mg total) by mouth daily, Disp: 10 tablet, Rfl: 0  Social History     Socioeconomic History    Marital status: /Civil Union     Spouse name: Christo    Number of children: 1    Years of education: Not on file    Highest education level: Not on file   Occupational History    Occupation: [employer]   Tobacco Use    Smoking status: Former     Current packs/day: 0.00     Average packs/day: 0.5 packs/day for 20.0 years (10.0 ttl pk-yrs)     Types: Cigarettes     Start date: 1980     Quit date: 2000     Years since quittin.3    Smokeless tobacco: Never    Tobacco comments:     Quit   Vaping Use    Vaping status: Never Used   Substance and Sexual Activity    Alcohol use: Yes     Alcohol/week: 2.0 standard drinks of alcohol     Types: 1 Glasses of wine, 1 Standard drinks or equivalent per week    Drug use: No     Comment: socially    Sexual activity: Yes     Partners: Male     Birth control/protection: Post-menopausal, Surgical     Comment:    Other Topics Concern    Not on file   Social History Narrative    Who lives in your home:  and self    What type of home do you live in: Single    Age of your home: 40 yrs    How long have you been living there: 15 yrs    Type of heat: Forced hot air    Central AC    Type of fuel: oil    What type of radha is in your bedroom:Carpeting    Do you have the following in or near your home:    Air products: No purifiers or humdifiers    Pests: No    Pets: Dog (sweata)    Are pets allowed in bedroom: Yes    Open fields, wooded areas nearby: Wooded  area    Basement: Partial finished, dry, dehumidifier, crawl space    Exposure to second hand smoke: No        Habits:    Caffeine: Coffee; Amount: 2/day, 40 years; Soda occasionally; hot tea/iced tea occasionally    Chocolate: occasionally            Works full time     Social Determinants of Health     Financial Resource Strain: Not on file   Food Insecurity: Not on file   Transportation Needs: Not on file   Physical Activity: Insufficiently Active (11/2/2021)    Exercise Vital Sign     Days of Exercise per Week: 4 days     Minutes of Exercise per Session: 30 min   Stress: Not on file   Social Connections: Not on file   Intimate Partner Violence: Not on file   Housing Stability: Not on file     Family History   Problem Relation Age of Onset    Diabetes Mother     Heart disease Mother     Stroke Mother     Thyroid disease Mother     Diabetes Father     Heart disease Father     Hypertension Family     Uterine cancer Sister     Thyroid cancer Sister     Endometrial cancer Sister 40    Prostate cancer Brother 62    No Known Problems Brother     Allergies Sister     No Known Problems Brother     Lionel syndrome Sister     Neurodegenerative disease Sister     Ovarian cancer Paternal Aunt 70    No Known Problems Daughter     No Known Problems Maternal Grandmother     No Known Problems Maternal Grandfather     No Known Problems Paternal Grandmother     No Known Problems Paternal Grandfather     No Known Problems Maternal Aunt     No Known Problems Maternal Aunt     No Known Problems Maternal Aunt     No Known Problems Maternal Aunt     No Known Problems Maternal Aunt     No Known Problems Paternal Aunt     No Known Problems Paternal Aunt      Past Medical History:   Diagnosis Date    Acne 9/1970    Allergic Per chart    Arthritis     Bunion     Callus     Disease of thyroid gland     Gout     Hammer toe     High arches     Ingrown toenail     Left knee injury     grade 1 injury of medial collateral ligament of left  "knee, sprain  of MCL joint    Lichen sclerosus     Menometrorrhagia     Metrorrhagia     Mitral valve prolapse syndrome     leaflet syndrome    Osteopenia     Plantar fasciitis     Rosacea     Rosacea     Thyroid nodule     Nontoxic single    Tinea pedis         Review of Systems   Respiratory: Negative.    Cardiovascular: Negative.    Gastrointestinal: Negative for constipation and diarrhea.   Genitourinary: Negative for difficulty urinating, pelvic pain, vaginal bleeding, vaginal discharge, itching or odor.    O:  Blood pressure 120/72, height 5' 3.5\" (1.613 m), weight 91.2 kg (201 lb), last menstrual period 10/03/2016, not currently breastfeeding.    Patient appears well and is not in distress  Neck is supple without masses  Breasts are symmetrical without mass, tenderness, nipple discharge, skin changes or adenopathy.   Abdomen is soft and nontender without masses.   External genitals are normal without lesions or rashes.  Urethral meatus and urethra are normal  Bladder is normal to palpation  Vagina is normal without discharge or bleeding.   Cervix is surgically absent.   Uterus is surgically absent.  Adnexa are surgically absent; no masses.     A:   Yearly exam.     P:   Pap no longer indicated  Mammo scheduled   Colonoscopy due 2033   DEXA up to date   Refill on econozole and estradiol provided    RTO one year for yearly exam or sooner as needed.     "

## 2024-05-13 ENCOUNTER — TELEPHONE (OUTPATIENT)
Age: 64
End: 2024-05-13

## 2024-05-13 ENCOUNTER — PREP FOR PROCEDURE (OUTPATIENT)
Age: 64
End: 2024-05-13

## 2024-05-13 ENCOUNTER — HOSPITAL ENCOUNTER (OUTPATIENT)
Dept: RADIOLOGY | Age: 64
Discharge: HOME | End: 2024-05-13
Payer: COMMERCIAL

## 2024-05-13 VITALS — HEIGHT: 64 IN | BODY MASS INDEX: 34.31 KG/M2 | WEIGHT: 201 LBS

## 2024-05-13 DIAGNOSIS — Z12.31 ENCOUNTER FOR SCREENING MAMMOGRAM FOR MALIGNANT NEOPLASM OF BREAST: ICD-10-CM

## 2024-05-13 DIAGNOSIS — K57.90 DIVERTICULOSIS: Primary | ICD-10-CM

## 2024-05-13 PROCEDURE — 77067 SCR MAMMO BI INCL CAD: CPT

## 2024-05-13 PROCEDURE — 77063 BREAST TOMOSYNTHESIS BI: CPT

## 2024-05-13 NOTE — LETTER
Muriel Dinero  [address] [place] [id number]        FLEXIBLE COLONOSCOPY INSTRUCTIONS  PLEASE NOTE...  AS OF JUNE 1, 2014, OUR OFFICE REQUIRES 72 HOURS NOTICE OF CANCELLATION/RESCHEDULE OF A PROCEDURE TO AVOID INCURRING A MISSED APPOINTMENT FEE.    Your Colonoscopy Procedure has been scheduled at:[facility]  [address] [place] [address]     The Date of your Procedure is:June 5, 2024       The hospital facility will contact you the evening prior to your scheduled procedure with your arrival time.     Use the bowel preparation as directed.    Check with your family doctor if you are taking a blood thinner (Coumadin, Plavix, Xarelto, Pradaxa, Gingko biloba, Ginseng, Feverfew, [place]'s Wort).  We suggest stopping these for 3 days. Special instructions may be needed if you are taking aspirin or any aspirin-containing medication.  Check with your family physician.      If you are on DIABETIC MEDICATION (tablets or insulin) your doctor may make changes in your preparation.    Take all medications usual unless otherwise instructed.    As always, if you have any questions or concerns, feel free to call the office.  Our  staff will be happy to connect you with one of the nurses to answer any questions or address any concerns you have regarding your procedure.      Do not wear any jewelry the day of your procedure including wedding rings.    Arrangements must be made for a responsible party to drive you home from the procedure.  If you do not have a responsible family member or friend to drive you home, the procedure will not be done.  Vast or a taxi is not acceptable.    It is important you notify our office of any insurance changes prior to your procedure and, if necessary, supply us with referrals from your primary care physician.                COLONOSCOPY PREPARATION INSTRUCTIONS    Purchase (prescription not required):  · 238 gram bottle of Miralax®  (Glycolax®)  · 4 Dulcolax® (Bisacodyl) Laxative Tablets  · 64 oz. bottle of Gatorade® or your preference of a non-carbonated clear liquid - NOT RED OR PURPLE     One Day Prior to Colonoscopy Procedure  · Nothing to eat the day before your procedure, only clear liquids.  · It is important that you drink plenty of clear liquids throughout the day to prevent dehydration.    Clear Liquids include:  o Water/Iced Tea/Lemonade/Gatorade®/Black Coffee or tea (no milk or creamers  o Soft drinks: orange, ginger ale, cola, Pepsi®, Sprite®, 7Up®  o Tera-Aid® (lemonade or orange flavors only)  o Strained fruit juices without pulp such as apple, white grape, white cranberry  o Jell-O®, lemon, lime or orange (no fruit or toppings)  o Popsicles, Italian Ice (No Ice Cream, sherbets, or fruit bars)  o Chicken or beef bouillon/broth  DO NOT EAT OR DRINK ANYTHING RED OR PURPLE  DO NOT DRINK ANY ALCOHOLIC BEVERAGES  DIABETIC PATIENTS: Consult your physician    At 4:00 pm, take (2) Dulcolax® (Bisacodyl) Laxative Tablets. Swallow the tablets whole with an 8 oz. glass of water. At 8:00 pm, take the additional (2) Dulcolax® (Bisacodyl) Laxative Tablets with 8 oz. of water. The package may direct you not to exceed (2) tablets at any time but for the purpose of this examination, you should take (4) total.    Mix the 238 gm of Miralax® in 64 oz. of Gatorade® and shake the solution until the Miralax® is dissolved. You will drink half (32 oz) of this solution this evening, beginning between 4 and 6 o'clock. Drink 8 oz glassfuls at your own pace. It may take several hours to drink the solution.    Remember to stay close to toilet facilities.    DAY OF COLONOSCOPY PROCEDURE    Five (5) hours before your procedure, drink the other half (32 oz) of the Miralax®/Gatorade® mixture within a two (2) hour period. This may require you to get up very early if you are scheduled for an early procedure.    NOTHING IS TO BE TAKEN BY MOUTH 3 HOURS PRIOR TO  PROCEDURE.     If you use an inhaler, please bring it with you to your procedure.

## 2024-05-13 NOTE — TELEPHONE ENCOUNTER
CE 10 yr recall, last fc 4/13/15 mild diverticulosis, BMI 35     Scheduled DE 6/5/24 [facility]   Mailed PW to patient

## 2024-05-21 NOTE — TELEPHONE ENCOUNTER
Contacted patient, no answer, LMOM-  I am calling from Dr Romeo's office. I am calling to remind you of your upcoming colonoscopy on 6/5/24 and confirm you have your bowel prep instructions. If advised to hold certain medications prior to procedure, please remember to do so.     You will still receive a confirmation call the day prior to your appointment from the facility with your arrival time.     If you need to reschedule or do not have your instructions please call our office at [phone].

## 2024-05-28 ENCOUNTER — OFFICE VISIT (OUTPATIENT)
Dept: PODIATRY | Facility: CLINIC | Age: 64
End: 2024-05-28
Payer: COMMERCIAL

## 2024-05-28 VITALS
HEIGHT: 63 IN | BODY MASS INDEX: 35.61 KG/M2 | RESPIRATION RATE: 18 BRPM | SYSTOLIC BLOOD PRESSURE: 120 MMHG | DIASTOLIC BLOOD PRESSURE: 75 MMHG

## 2024-05-28 DIAGNOSIS — L60.0 INGROWN TOENAIL: Primary | ICD-10-CM

## 2024-05-28 PROCEDURE — 11750 EXCISION NAIL&NAIL MATRIX: CPT | Performed by: PODIATRIST

## 2024-05-28 RX ORDER — LIDOCAINE HYDROCHLORIDE AND EPINEPHRINE 10; 10 MG/ML; UG/ML
1 INJECTION, SOLUTION INFILTRATION; PERINEURAL ONCE
Status: COMPLETED | OUTPATIENT
Start: 2024-05-28 | End: 2024-05-28

## 2024-05-28 RX ORDER — LIDOCAINE HYDROCHLORIDE 10 MG/ML
1 INJECTION, SOLUTION EPIDURAL; INFILTRATION; INTRACAUDAL; PERINEURAL ONCE
Status: COMPLETED | OUTPATIENT
Start: 2024-05-28 | End: 2024-05-28

## 2024-05-28 RX ADMIN — LIDOCAINE HYDROCHLORIDE 1 ML: 10 INJECTION, SOLUTION EPIDURAL; INFILTRATION; INTRACAUDAL; PERINEURAL at 16:26

## 2024-05-28 RX ADMIN — LIDOCAINE HYDROCHLORIDE AND EPINEPHRINE 1 ML: 10; 10 INJECTION, SOLUTION INFILTRATION; PERINEURAL at 16:26

## 2024-05-28 NOTE — PROGRESS NOTES
Patient presents with recurrence of a painful ingrown toenail along the medial nail border of the left third toe.  Patient had a partial avulsion performed on this digit approximately 13 months ago but the nail and pain have recurred.  More permanent correction is desired.    Discussed treatment options recommending partial matrixectomy.  The goal of this procedure is permanent elimination of the offending nail border.  Procedure performed as follows:  Anesthesia via 2 cc of a 1:1 mixture of 1 percent xylocaine with epinephrine and 1 percent xylocaine plain.  A Betadine prep was performed.  The medial nail border of the left 3rdtoe was avulsed to the eponychium.  A partial matrixectomy was performed utilizing phenol in a standard manner.  A bacitracin dressing was applied.  The patient is to soak in warm water twice a day tomorrow followed by a Neosporin dressing.     Nail removal    Date/Time: 5/28/2024 3:30 PM    Performed by: Jama Polanco DPM  Authorized by: Jama Polanco DPM    Patient location:  ClinicUniversal Protocol:  Consent: Verbal consent obtained.  Risks and benefits: risks, benefits and alternatives were discussed  Patient understanding: patient states understanding of the procedure being performed  Patient identity confirmed: verbally with patient    Location:     Foot:  L third toe  Pre-procedure details:     Skin preparation:  Betadine  Anesthesia (see MAR for exact dosages):     Anesthesia method:  Nerve block    Block needle gauge:  25 G    Block anesthetic:  Lidocaine 1% WITH epi and lidocaine 1% w/o epi    Block injection procedure:  Anatomic landmarks identified    Block outcome:  Anesthesia achieved  Nail Removal:     Nail removed:  Partial    Nail side:  Medial  Ingrown nail:     Wedge excision of skin: no      Nail matrix removed or ablated:  Partial  Post-procedure details:     Dressing:  4x4 sterile gauze and gauze roll    Patient tolerance of procedure:  Tolerated well, no immediate  complications

## 2024-06-04 ENCOUNTER — ANESTHESIA (OUTPATIENT)
Dept: ANESTHESIOLOGY | Facility: HOSPITAL | Age: 64
End: 2024-06-04

## 2024-06-04 ENCOUNTER — ANESTHESIA EVENT (OUTPATIENT)
Dept: ANESTHESIOLOGY | Facility: HOSPITAL | Age: 64
End: 2024-06-04

## 2024-06-04 ENCOUNTER — OFFICE VISIT (OUTPATIENT)
Dept: DERMATOLOGY | Facility: CLINIC | Age: 64
End: 2024-06-04
Payer: COMMERCIAL

## 2024-06-04 VITALS — BODY MASS INDEX: 34.15 KG/M2 | HEIGHT: 64 IN | WEIGHT: 200 LBS

## 2024-06-04 DIAGNOSIS — L82.1 SEBORRHEIC KERATOSIS: ICD-10-CM

## 2024-06-04 DIAGNOSIS — L90.0 LICHEN SCLEROSUS: Primary | ICD-10-CM

## 2024-06-04 DIAGNOSIS — L71.9 ROSACEA: ICD-10-CM

## 2024-06-04 DIAGNOSIS — D48.5 NEOPLASM OF UNCERTAIN BEHAVIOR OF SKIN: ICD-10-CM

## 2024-06-04 PROCEDURE — 88313 SPECIAL STAINS GROUP 2: CPT | Performed by: STUDENT IN AN ORGANIZED HEALTH CARE EDUCATION/TRAINING PROGRAM

## 2024-06-04 PROCEDURE — 99214 OFFICE O/P EST MOD 30 MIN: CPT | Performed by: DERMATOLOGY

## 2024-06-04 PROCEDURE — 88342 IMHCHEM/IMCYTCHM 1ST ANTB: CPT | Performed by: STUDENT IN AN ORGANIZED HEALTH CARE EDUCATION/TRAINING PROGRAM

## 2024-06-04 PROCEDURE — 11102 TANGNTL BX SKIN SINGLE LES: CPT | Performed by: DERMATOLOGY

## 2024-06-04 PROCEDURE — 88305 TISSUE EXAM BY PATHOLOGIST: CPT | Performed by: STUDENT IN AN ORGANIZED HEALTH CARE EDUCATION/TRAINING PROGRAM

## 2024-06-04 PROCEDURE — 88341 IMHCHEM/IMCYTCHM EA ADD ANTB: CPT | Performed by: STUDENT IN AN ORGANIZED HEALTH CARE EDUCATION/TRAINING PROGRAM

## 2024-06-04 RX ORDER — FLUOCINOLONE ACETONIDE 0.25 MG/G
1 OINTMENT TOPICAL 2 TIMES DAILY
Qty: 60 G | Refills: 6 | Status: SHIPPED | OUTPATIENT
Start: 2024-06-04

## 2024-06-04 NOTE — ANESTHESIA PREPROCEDURE EVALUATION
Procedure:  PRE-OP ONLY    TTE: LVEF 60%, RV wnl  Stress: No evidence of ischemia   Relevant Problems   CARDIO   (+) Hyperlipidemia   (+) Hypertriglyceridemia      MUSCULOSKELETAL   (+) Arthritis             Anesthesia Plan  ASA Score- 2     Anesthesia Type- IV sedation with anesthesia with ASA Monitors.         Additional Monitors:     Airway Plan:            Plan Factors-    Chart reviewed. EKG reviewed.  Existing labs reviewed. Patient summary reviewed.                  Induction-     Postoperative Plan-         Informed Consent-

## 2024-06-04 NOTE — PATIENT INSTRUCTIONS
"INFORMED CONSENT DISCUSSION AND POST-OPERATIVE INSTRUCTIONS FOR PATIENT    I.  RATIONALE FOR PROCEDURE  I understand that a skin biopsy allows the Dermatologist to test a lesion or rash under the microscope to obtain a diagnosis.  It usually involves numbing the area with numbing medication and removing a small piece of skin; sometimes the area will be closed with sutures. In this specific procedure, sutures are not usually needed.  If any sutures are placed, then they are usually need to be removed in 2 weeks or less.    I understand that my Dermatologist recommends that a skin \"shave\" biopsy be performed today.  A local anesthetic, similar to the kind that a dentist uses when filling a cavity, will be injected with a very small needle into the skin area to be sampled.  The injected skin and tissue underneath \"will go to sleep” and become numb so no pain should be felt afterwards.  An instrument shaped like a tiny \"razor blade\" (shave biopsy instrument) will be used to cut a small piece of tissue and skin from the area so that a sample of tissue can be taken and examined more closely under the microscope.  A slight amount of bleeding will occur, but it will be stopped with direct pressure and a pressure bandage and any other appropriate methods.  I understands that a scar will form where the wound was created.  Surgical ointment will be applied to help protect the wound.  Sutures are not usually needed.    II.  RISKS AND POTENTIAL COMPLICATIONS   I understand the risks and potential complications of a skin biopsy include but are not limited to the following:  Bleeding  Infection  Pain  Scar/keloid  Skin discoloration  Incomplete Removal  Recurrence  Nerve Damage/Numbness/Loss of Function  Allergic Reaction to Anesthesia  Biopsies are diagnostic procedures and based on findings additional treatment or evaluation may be required  Loss or destruction of specimen resulting in no additional findings    My Dermatologist " "has explained to me the nature of the condition, the nature of the procedure, and the benefits to be reasonably expected compared with alternative approaches.  My Dermatologist has discussed the likelihood of major risks or complications of this procedure including the specific risks listed above, such as bleeding, infection, and scarring/keloid.  I understand that a scar is expected after this procedure.  I understand that my physician cannot predict if the scar will form a \"keloid,\" which extends beyond the borders of the wound that is created.  A keloid is a thick, painful, and bumpy scar.  A keloid can be difficult to treat, as it does not always respond well to therapy, which includes injecting cortisone directly into the keloid every few weeks.  While this usually reduces the pain and size of the scar, it does not eliminate it.      I understand that photographs may be taken before and after the procedure.  These will be maintained as part of the medical providers confidential records and may not be made available to me.  I further authorize the medical provider to use the photographs for teaching purposes or to illustrate scientific papers, books, or lectures if in his/her judgment, medical research, education, or science may benefit from its use.    I have had an opportunity to fully inquire about the risks and benefits of this procedure and its alternatives.   I have been given ample time and opportunity to ask questions and to seek a second opinion if I wished to do so.  I acknowledge that there have specifically been no guarantees as to the cosmetic results from the procedure.  I am aware that with any procedure there is always the possibility of an unexpected complication.    III. POST-PROCEDURAL CARE (WHAT YOU WILL NEED TO DO \"AFTER THE BIOPSY\" TO OPTIMIZE HEALING)    Keep the area clean and dry.  Try NOT to remove the bandage or get it wet for the first 24 hours.    Gently clean the area and apply " "surgical ointment (such as Vaseline petrolatum ointment, which is available \"over the counter\" and not a prescription) to the biopsy site for up to 2 weeks straight.  This acts to protect the wound from the outside world.      Sutures are not usually placed in this procedure.  If any sutures were placed, return for suture removal as instructed (generally 1 week for the face, 2 weeks for the body).      Take Acetaminophen (Tylenol) for discomfort, if no contraindications.  Ibuprofen or aspirin could make bleeding worse.    Call our office immediately for signs of infection: fever, chills, increased redness, warmth, tenderness, discomfort/pain, or pus or foul smell coming from the wound.    WHAT TO DO IF THERE IS ANY BLEEDING?  If a small amount of bleeding is noticed, place a clean cloth over the area and apply firm pressure for ten minutes.  Check the wound after 10 minutes of direct pressure.  If bleeding persists, try one more time for an additional 10 minutes of direct pressure on the area.  If the bleeding becomes heavier or does not stop after the second attempt, or if you have any other questions about this procedure, then please call your [facility]'s Dermatologist by calling [phone] (SKIN).     I hereby acknowledge that I have reviewed and verified the site with my Dermatologist and have requested and authorized my Dermatologist to proceed with the procedure.     "

## 2024-06-05 ENCOUNTER — HOSPITAL ENCOUNTER (OUTPATIENT)
Dept: GASTROENTEROLOGY | Facility: HOSPITAL | Age: 64
Setting detail: OUTPATIENT SURGERY
Discharge: HOME | End: 2024-06-05
Attending: COLON & RECTAL SURGERY
Payer: COMMERCIAL

## 2024-06-05 ENCOUNTER — ANESTHESIA (OUTPATIENT)
Dept: GASTROENTEROLOGY | Facility: HOSPITAL | Age: 64
End: 2024-06-05

## 2024-06-05 ENCOUNTER — ANESTHESIA EVENT (OUTPATIENT)
Dept: GASTROENTEROLOGY | Facility: HOSPITAL | Age: 64
End: 2024-06-05

## 2024-06-05 VITALS
DIASTOLIC BLOOD PRESSURE: 66 MMHG | HEIGHT: 63 IN | TEMPERATURE: 96.4 F | RESPIRATION RATE: 18 BRPM | OXYGEN SATURATION: 96 % | SYSTOLIC BLOOD PRESSURE: 102 MMHG | BODY MASS INDEX: 35.44 KG/M2 | WEIGHT: 200 LBS | HEART RATE: 65 BPM

## 2024-06-05 DIAGNOSIS — K57.90 DIVERTICULOSIS: ICD-10-CM

## 2024-06-05 PROCEDURE — 88305 TISSUE EXAM BY PATHOLOGIST: CPT | Performed by: PATHOLOGY

## 2024-06-05 PROCEDURE — 45380 COLONOSCOPY AND BIOPSY: CPT | Performed by: COLON & RECTAL SURGERY

## 2024-06-05 RX ORDER — PROPOFOL 10 MG/ML
INJECTION, EMULSION INTRAVENOUS AS NEEDED
Status: DISCONTINUED | OUTPATIENT
Start: 2024-06-05 | End: 2024-06-05

## 2024-06-05 RX ORDER — ALBUTEROL SULFATE 2.5 MG/3ML
2.5 SOLUTION RESPIRATORY (INHALATION) ONCE AS NEEDED
Status: CANCELLED | OUTPATIENT
Start: 2024-06-05

## 2024-06-05 RX ORDER — FENTANYL CITRATE/PF 50 MCG/ML
25 SYRINGE (ML) INJECTION
Status: CANCELLED | OUTPATIENT
Start: 2024-06-05

## 2024-06-05 RX ORDER — ONDANSETRON 2 MG/ML
4 INJECTION INTRAMUSCULAR; INTRAVENOUS ONCE AS NEEDED
Status: CANCELLED | OUTPATIENT
Start: 2024-06-05

## 2024-06-05 RX ORDER — SODIUM CHLORIDE, SODIUM LACTATE, POTASSIUM CHLORIDE, CALCIUM CHLORIDE 600; 310; 30; 20 MG/100ML; MG/100ML; MG/100ML; MG/100ML
INJECTION, SOLUTION INTRAVENOUS CONTINUOUS PRN
Status: DISCONTINUED | OUTPATIENT
Start: 2024-06-05 | End: 2024-06-05

## 2024-06-05 RX ADMIN — PROPOFOL 150 MG: 10 INJECTION, EMULSION INTRAVENOUS at 12:58

## 2024-06-05 RX ADMIN — PROPOFOL 50 MG: 10 INJECTION, EMULSION INTRAVENOUS at 13:01

## 2024-06-05 RX ADMIN — SODIUM CHLORIDE, SODIUM LACTATE, POTASSIUM CHLORIDE, AND CALCIUM CHLORIDE: .6; .31; .03; .02 INJECTION, SOLUTION INTRAVENOUS at 12:54

## 2024-06-05 RX ADMIN — PROPOFOL 50 MG: 10 INJECTION, EMULSION INTRAVENOUS at 13:10

## 2024-06-05 RX ADMIN — PROPOFOL 50 MG: 10 INJECTION, EMULSION INTRAVENOUS at 13:22

## 2024-06-05 RX ADMIN — PROPOFOL 50 MG: 10 INJECTION, EMULSION INTRAVENOUS at 12:59

## 2024-06-05 RX ADMIN — PROPOFOL 50 MG: 10 INJECTION, EMULSION INTRAVENOUS at 13:15

## 2024-06-05 NOTE — ANESTHESIA PREPROCEDURE EVALUATION
Procedure:  COLONOSCOPY  TTE: LVEF 60%, RV wnl  Stress: No evidence of ischemia     Denies the following: CP/SOB with exertion, asthma, COPD, JAN, stroke/TIA, seizure    Relevant Problems   CARDIO   (+) Hyperlipidemia   (+) Hypertriglyceridemia      MUSCULOSKELETAL   (+) Arthritis        Physical Exam    Airway    Mallampati score: I  TM Distance: >3 FB  Neck ROM: full     Dental   No notable dental hx     Cardiovascular      Pulmonary      Other Findings  post-pubertal.      Anesthesia Plan  ASA Score- 2     Anesthesia Type- IV sedation with anesthesia with ASA Monitors.         Additional Monitors:     Airway Plan:            Plan Factors-Exercise tolerance (METS): >4 METS.    Chart reviewed. EKG reviewed.  Existing labs reviewed. Patient summary reviewed.    Patient is not a current smoker.      Obstructive sleep apnea risk education given perioperatively.        Induction-     Postoperative Plan-         Informed Consent- Anesthetic plan and risks discussed with patient.  I personally reviewed this patient with the CRNA. Discussed and agreed on the Anesthesia Plan with the CRNA..

## 2024-06-05 NOTE — INTERVAL H&P NOTE
4/2015, last colonoscopy, she is due for recall screening.  Screening colonoscopy,discussed in a face-to-face, personal, informed consent process, the benefits, alternatives, risks including not limited to bleeding, missed lesion, perforation requiring emergent surgery discussed/understood.   H&P reviewed. After examining the patient I find no changes in the patients condition since the H&P had been written.    Vitals:    06/05/24 1218   BP: 117/71   Pulse: 77   Resp: 17   Temp: 97.6 °F (36.4 °C)   SpO2: 98%

## 2024-06-10 PROCEDURE — 88342 IMHCHEM/IMCYTCHM 1ST ANTB: CPT | Performed by: STUDENT IN AN ORGANIZED HEALTH CARE EDUCATION/TRAINING PROGRAM

## 2024-06-10 PROCEDURE — 88305 TISSUE EXAM BY PATHOLOGIST: CPT | Performed by: STUDENT IN AN ORGANIZED HEALTH CARE EDUCATION/TRAINING PROGRAM

## 2024-06-10 PROCEDURE — 88305 TISSUE EXAM BY PATHOLOGIST: CPT | Performed by: PATHOLOGY

## 2024-06-10 PROCEDURE — 88313 SPECIAL STAINS GROUP 2: CPT | Performed by: STUDENT IN AN ORGANIZED HEALTH CARE EDUCATION/TRAINING PROGRAM

## 2024-06-10 PROCEDURE — 88341 IMHCHEM/IMCYTCHM EA ADD ANTB: CPT | Performed by: STUDENT IN AN ORGANIZED HEALTH CARE EDUCATION/TRAINING PROGRAM

## 2024-06-10 NOTE — RESULT ENCOUNTER NOTE
"DERMATOPATHOLOGY RESULT NOTE    Results reviewed by ordering physician.  Called patient to personally discuss results. No answer, left voicemail with result.  ILEFT MESSAGE THAT PATHOLOGIST AND CONSULTANT FAVOR A HARMLESS LESION CALLED ANGIOFIBROMA.  BUT THEY RECOMMEND THAT WE OBSERVE HEALING, IF LESION PERSISTS BEYOND A MONTH OR 2 THEN A SMALL EXCISIONAL BIOPSY WITH A STITCH OR 2 WOULD BE WARRANTED.      Instructions for Clinical Derm Team:   (remember to route Result Note to appropriate staff):    None    Result & Plan by Specimen:    Specimen A: indeterminate  Plan: monitor AND REPORT IF PERSISTENT.    Final Diagnosis  A. Skin, bridge of nose, shave biopsy:    Atypical endophytic squamous proliferation accompanied by vascular ectasia and dermal spindle cell proliferation (see note).    Note: The histopathologic findings are unusual and difficult to characterize, but many of the findings are suggestive of an ANGIOFIBROMA (FIBROUS PAPULE), traumatized. Squamous atypia is seen, though much of the squamous atypia appears reactive (due to trauma and/or inflammation). If the lesion were to persist or recur, additional sampling should be sought. Pathogenic microorganisms are not seen with PAS stain. Multiple levels examined. SOX10, CD31, p40, and CK AE1/AE3 immunostains were reviewed.       This case was sent for expert dermatopathology consultation by Dr. Mendiola, whose report is forthcoming under the \"Note\" section.      Electronically signed by Sobeida Taylor MD on 6/10/2024 at  3:40 PM  Preliminary result electronically signed by Sobeida Taylor MD on 6/7/2024 at 11:19 AM  Note   FINAL DIAGNOSIS: Report date: 6/10/2024  A. SKIN (BRIDGE OF NOSE ) EXCISION :  Traumatized cellular fibrous papule/angiofibroma. (see note)    Note: I entirely agree with your interpretation of this case. Immunohistochemical stains p40, CD31,  pankeratin and Sox10 were examined. No fungal organisms are identified with PASD " stain.  _____________________________________________________  Final report electronically signed out by Gio Mendiola M.D. Ph.D. on 6/10/2024  Comment: This is an appended report. These results have been appended to a previously preliminary verified report.  Add

## (undated) DEVICE — PROGRASP FORCEPS: Brand: ENDOWRIST

## (undated) DEVICE — ANTIBACTERIAL VIOLET BRAIDED (POLYGLACTIN 910), SYNTHETIC ABSORBABLE SUTURE: Brand: COATED VICRYL

## (undated) DEVICE — UTERINE MANIPULATOR RUMI 6.7 X 10 CM

## (undated) DEVICE — SPECIMEN TRAP: Brand: ARGYLE

## (undated) DEVICE — STERILE CYSTO PACK: Brand: CARDINAL HEALTH

## (undated) DEVICE — CHLORHEXIDINE 4PCT 4 OZ

## (undated) DEVICE — OCCLUDER COLPO-PNEUMO

## (undated) DEVICE — ENDOPATH XCEL BLADELESS TROCARS WITH STABILITY SLEEVES: Brand: ENDOPATH XCEL

## (undated) DEVICE — FENESTRATED BIPOLAR FORCEPS: Brand: ENDOWRIST

## (undated) DEVICE — UTERINE MANIPULATOR RUMI 6.7 X 8 CM

## (undated) DEVICE — DRAPE FLUID WARMER (BIRD BATH)

## (undated) DEVICE — NEEDLE 25G X 1 1/2

## (undated) DEVICE — TIP COVER ACCESSORY

## (undated) DEVICE — STRL COTTON TIP APPLCTR 6IN PK: Brand: CARDINAL HEALTH

## (undated) DEVICE — SUT MONOCRYL 4-0 PS-2 27 IN Y426H

## (undated) DEVICE — 1820 FOAM BLOCK NEEDLE COUNTER: Brand: DEVON

## (undated) DEVICE — 3000CC GUARDIAN II: Brand: GUARDIAN

## (undated) DEVICE — GLOVE SRG BIOGEL 8.5

## (undated) DEVICE — Device: Brand: MEDEX

## (undated) DEVICE — MAYO STAND COVER: Brand: CONVERTORS

## (undated) DEVICE — ARM DRAPE

## (undated) DEVICE — GLOVE INDICATOR PI UNDERGLOVE SZ 7 BLUE

## (undated) DEVICE — ALL PURPOSE SPONGES,NONWOVEN, 4 PLY: Brand: CURITY

## (undated) DEVICE — CANNULA SEAL

## (undated) DEVICE — CATH URETERAL 5FR X 70 CM FLEX TIP POLYUR BARD

## (undated) DEVICE — MONOPOLAR CURVED SCISSORS: Brand: ENDOWRIST

## (undated) DEVICE — COLUMN DRAPE

## (undated) DEVICE — TRAY FOLEY 16FR SURESTEP TEMP SENS URIMETER STAT LOK

## (undated) DEVICE — CHLORAPREP HI-LITE 26ML ORANGE

## (undated) DEVICE — ADHESIVE SKN CLSR HISTOACRYL FLEX 0.5ML LF

## (undated) DEVICE — SYRINGE 10ML LL

## (undated) DEVICE — GLOVE INDICATOR PI UNDERGLOVE SZ 8.5 BLUE

## (undated) DEVICE — GLOVE INDICATOR PI UNDERGLOVE SZ 8 BLUE

## (undated) DEVICE — CLAMP TOWEL TUBING DISPOSABLE

## (undated) DEVICE — GLOVE SRG BIOGEL 7.5

## (undated) DEVICE — LUBRICANT INST ELECTROLUBE ANTISTK WO PAD

## (undated) DEVICE — PACK ROBOTIC PROSTATE PBDS DA VINCI SI/XI

## (undated) DEVICE — ASTOUND STANDARD SURGICAL GOWN, XL: Brand: CONVERTORS

## (undated) DEVICE — SYRINGE 50ML LL

## (undated) DEVICE — CATH URETHERAL CONNECTOR